# Patient Record
Sex: MALE | Race: WHITE | Employment: UNEMPLOYED
[De-identification: names, ages, dates, MRNs, and addresses within clinical notes are randomized per-mention and may not be internally consistent; named-entity substitution may affect disease eponyms.]

---

## 2017-03-08 ENCOUNTER — OFFICE VISIT (OUTPATIENT)
Dept: FAMILY MEDICINE CLINIC | Facility: CLINIC | Age: 37
End: 2017-03-08

## 2017-03-08 VITALS
TEMPERATURE: 97.9 F | HEART RATE: 122 BPM | WEIGHT: 315 LBS | BODY MASS INDEX: 36.45 KG/M2 | SYSTOLIC BLOOD PRESSURE: 160 MMHG | HEIGHT: 78 IN | RESPIRATION RATE: 24 BRPM | OXYGEN SATURATION: 95 % | DIASTOLIC BLOOD PRESSURE: 98 MMHG

## 2017-03-08 DIAGNOSIS — R23.8 BLISTERS OF MULTIPLE SITES: ICD-10-CM

## 2017-03-08 DIAGNOSIS — M79.604 RIGHT LEG PAIN: ICD-10-CM

## 2017-03-08 DIAGNOSIS — Z13.9 SCREENING: ICD-10-CM

## 2017-03-08 DIAGNOSIS — I10 ESSENTIAL HYPERTENSION: Primary | ICD-10-CM

## 2017-03-08 PROCEDURE — 99214 OFFICE O/P EST MOD 30 MIN: CPT | Performed by: FAMILY MEDICINE

## 2017-03-08 RX ORDER — LISINOPRIL AND HYDROCHLOROTHIAZIDE 25; 20 MG/1; MG/1
1 TABLET ORAL DAILY
Qty: 30 TABLET | Refills: 3 | Status: SHIPPED | OUTPATIENT
Start: 2017-03-08 | End: 2017-05-05 | Stop reason: SDUPTHER

## 2017-05-05 DIAGNOSIS — J32.9 SINUSITIS, UNSPECIFIED CHRONICITY, UNSPECIFIED LOCATION: ICD-10-CM

## 2017-05-05 DIAGNOSIS — J30.9 ALLERGIC RHINITIS, UNSPECIFIED ALLERGIC RHINITIS TRIGGER, UNSPECIFIED RHINITIS SEASONALITY: Primary | ICD-10-CM

## 2017-05-05 DIAGNOSIS — M79.604 RIGHT LEG PAIN: ICD-10-CM

## 2017-05-05 DIAGNOSIS — I10 ESSENTIAL HYPERTENSION: ICD-10-CM

## 2017-05-05 DIAGNOSIS — M79.89 LEFT LEG SWELLING: ICD-10-CM

## 2017-05-05 RX ORDER — IBUPROFEN 400 MG/1
400 TABLET ORAL EVERY 8 HOURS PRN
Qty: 90 TABLET | Refills: 0 | Status: SHIPPED | OUTPATIENT
Start: 2017-05-05 | End: 2017-10-10 | Stop reason: ALTCHOICE

## 2017-05-05 RX ORDER — LISINOPRIL AND HYDROCHLOROTHIAZIDE 25; 20 MG/1; MG/1
1 TABLET ORAL DAILY
Qty: 30 TABLET | Refills: 5 | Status: SHIPPED | OUTPATIENT
Start: 2017-05-05 | End: 2017-05-31 | Stop reason: ALTCHOICE

## 2017-05-05 RX ORDER — AMLODIPINE BESYLATE 10 MG/1
10 TABLET ORAL DAILY
Qty: 30 TABLET | Refills: 5 | Status: SHIPPED | OUTPATIENT
Start: 2017-05-05 | End: 2017-05-31 | Stop reason: SINTOL

## 2017-05-05 RX ORDER — FLUTICASONE PROPIONATE 50 MCG
2 SPRAY, SUSPENSION (ML) NASAL DAILY PRN
Qty: 1 BOTTLE | Refills: 3 | Status: SHIPPED | OUTPATIENT
Start: 2017-05-05 | End: 2017-11-30

## 2017-05-31 ENCOUNTER — OFFICE VISIT (OUTPATIENT)
Dept: FAMILY MEDICINE CLINIC | Age: 37
End: 2017-05-31
Payer: COMMERCIAL

## 2017-05-31 ENCOUNTER — HOSPITAL ENCOUNTER (OUTPATIENT)
Age: 37
Discharge: HOME | End: 2017-05-31
Payer: COMMERCIAL

## 2017-05-31 VITALS
OXYGEN SATURATION: 97 % | SYSTOLIC BLOOD PRESSURE: 168 MMHG | BODY MASS INDEX: 36.45 KG/M2 | DIASTOLIC BLOOD PRESSURE: 101 MMHG | HEIGHT: 78 IN | RESPIRATION RATE: 22 BRPM | TEMPERATURE: 97.5 F | HEART RATE: 103 BPM | WEIGHT: 315 LBS

## 2017-05-31 DIAGNOSIS — I10 ESSENTIAL HYPERTENSION: Primary | ICD-10-CM

## 2017-05-31 DIAGNOSIS — M79.89 BILATERAL SWELLING OF FEET: ICD-10-CM

## 2017-05-31 DIAGNOSIS — I10 ESSENTIAL HYPERTENSION: ICD-10-CM

## 2017-05-31 DIAGNOSIS — Z13.9 SCREENING: ICD-10-CM

## 2017-05-31 DIAGNOSIS — R73.03 PREDIABETES: ICD-10-CM

## 2017-05-31 LAB
ALBUMIN SERPL-MCNC: 4.2 G/DL (ref 3.5–5.2)
ALBUMIN/GLOBULIN RATIO: ABNORMAL (ref 1–2.5)
ALP BLD-CCNC: 61 U/L (ref 40–129)
ALT SERPL-CCNC: 38 U/L (ref 5–41)
ANION GAP SERPL CALCULATED.3IONS-SCNC: 14 MMOL/L (ref 9–17)
AST SERPL-CCNC: 35 U/L
BILIRUB SERPL-MCNC: 0.56 MG/DL (ref 0.3–1.2)
BUN BLDV-MCNC: 14 MG/DL (ref 6–20)
BUN/CREAT BLD: ABNORMAL (ref 9–20)
CALCIUM SERPL-MCNC: 9.5 MG/DL (ref 8.6–10.4)
CHLORIDE BLD-SCNC: 100 MMOL/L (ref 98–107)
CHOLESTEROL/HDL RATIO: 3.6
CHOLESTEROL: 158 MG/DL
CO2: 24 MMOL/L (ref 20–31)
CREAT SERPL-MCNC: 0.58 MG/DL (ref 0.7–1.2)
ESTIMATED AVERAGE GLUCOSE: 126 MG/DL
GFR AFRICAN AMERICAN: >60 ML/MIN
GFR NON-AFRICAN AMERICAN: >60 ML/MIN
GFR SERPL CREATININE-BSD FRML MDRD: ABNORMAL ML/MIN/{1.73_M2}
GFR SERPL CREATININE-BSD FRML MDRD: ABNORMAL ML/MIN/{1.73_M2}
GLUCOSE BLD-MCNC: 170 MG/DL (ref 70–99)
HBA1C MFR BLD: 6 % (ref 4–6)
HDLC SERPL-MCNC: 44 MG/DL
HIV AG/AB: NONREACTIVE
LDL CHOLESTEROL: 78 MG/DL (ref 0–130)
POTASSIUM SERPL-SCNC: 4.2 MMOL/L (ref 3.7–5.3)
SODIUM BLD-SCNC: 138 MMOL/L (ref 135–144)
TOTAL PROTEIN: 7.4 G/DL (ref 6.4–8.3)
TRIGL SERPL-MCNC: 180 MG/DL
VLDLC SERPL CALC-MCNC: ABNORMAL MG/DL (ref 1–30)

## 2017-05-31 PROCEDURE — 80061 LIPID PANEL: CPT

## 2017-05-31 PROCEDURE — 80053 COMPREHEN METABOLIC PANEL: CPT

## 2017-05-31 PROCEDURE — 87389 HIV-1 AG W/HIV-1&-2 AB AG IA: CPT

## 2017-05-31 PROCEDURE — 99214 OFFICE O/P EST MOD 30 MIN: CPT | Performed by: FAMILY MEDICINE

## 2017-05-31 PROCEDURE — 36415 COLL VENOUS BLD VENIPUNCTURE: CPT

## 2017-05-31 PROCEDURE — 83036 HEMOGLOBIN GLYCOSYLATED A1C: CPT

## 2017-05-31 RX ORDER — HYDROCHLOROTHIAZIDE 25 MG/1
25 TABLET ORAL DAILY
Qty: 30 TABLET | Refills: 3 | Status: SHIPPED | OUTPATIENT
Start: 2017-05-31 | End: 2017-11-10 | Stop reason: SDUPTHER

## 2017-05-31 RX ORDER — LISINOPRIL 40 MG/1
40 TABLET ORAL DAILY
Qty: 30 TABLET | Refills: 3 | Status: SHIPPED | OUTPATIENT
Start: 2017-05-31 | End: 2017-10-17 | Stop reason: SDUPTHER

## 2017-05-31 RX ORDER — BENZOCAINE/MENTHOL 6 MG-10 MG
LOZENGE MUCOUS MEMBRANE
Qty: 1 EACH | Refills: 0 | Status: SHIPPED | OUTPATIENT
Start: 2017-05-31 | End: 2017-10-10 | Stop reason: ALTCHOICE

## 2017-06-02 ENCOUNTER — TELEPHONE (OUTPATIENT)
Dept: FAMILY MEDICINE CLINIC | Age: 37
End: 2017-06-02

## 2017-06-14 ENCOUNTER — OFFICE VISIT (OUTPATIENT)
Dept: FAMILY MEDICINE CLINIC | Age: 37
End: 2017-06-14
Payer: COMMERCIAL

## 2017-06-14 VITALS
DIASTOLIC BLOOD PRESSURE: 88 MMHG | BODY MASS INDEX: 36.45 KG/M2 | RESPIRATION RATE: 20 BRPM | WEIGHT: 315 LBS | OXYGEN SATURATION: 95 % | SYSTOLIC BLOOD PRESSURE: 120 MMHG | HEIGHT: 78 IN | HEART RATE: 106 BPM | TEMPERATURE: 97.3 F

## 2017-06-14 DIAGNOSIS — I10 ESSENTIAL HYPERTENSION: ICD-10-CM

## 2017-06-14 DIAGNOSIS — R73.03 PREDIABETES: Primary | ICD-10-CM

## 2017-06-14 DIAGNOSIS — M79.89 BILATERAL SWELLING OF FEET: ICD-10-CM

## 2017-06-14 PROCEDURE — 99214 OFFICE O/P EST MOD 30 MIN: CPT | Performed by: FAMILY MEDICINE

## 2017-06-30 ENCOUNTER — HOSPITAL ENCOUNTER (EMERGENCY)
Age: 37
Discharge: HOME | End: 2017-06-30
Attending: EMERGENCY MEDICINE
Payer: COMMERCIAL

## 2017-06-30 ENCOUNTER — APPOINTMENT (OUTPATIENT)
Dept: GENERAL RADIOLOGY | Age: 37
End: 2017-06-30
Payer: COMMERCIAL

## 2017-06-30 VITALS
DIASTOLIC BLOOD PRESSURE: 55 MMHG | BODY MASS INDEX: 36.45 KG/M2 | HEIGHT: 78 IN | SYSTOLIC BLOOD PRESSURE: 118 MMHG | TEMPERATURE: 98.3 F | HEART RATE: 85 BPM | RESPIRATION RATE: 17 BRPM | OXYGEN SATURATION: 95 % | WEIGHT: 315 LBS

## 2017-06-30 DIAGNOSIS — R07.89 CHEST DISCOMFORT: Primary | ICD-10-CM

## 2017-06-30 LAB
ABSOLUTE BANDS #: 0.18 K/UL (ref 0–1)
ABSOLUTE EOS #: 0 K/UL (ref 0–0.4)
ABSOLUTE LYMPH #: 2.91 K/UL (ref 1–4.8)
ABSOLUTE MONO #: 1.09 K/UL (ref 0.1–1.3)
ANION GAP SERPL CALCULATED.3IONS-SCNC: 12 MMOL/L (ref 9–17)
BANDS: 1 %
BASOPHILS # BLD: 0 %
BASOPHILS ABSOLUTE: 0 K/UL (ref 0–0.2)
BUN BLDV-MCNC: 12 MG/DL (ref 6–20)
BUN/CREAT BLD: ABNORMAL (ref 9–20)
CALCIUM SERPL-MCNC: 9.5 MG/DL (ref 8.6–10.4)
CHLORIDE BLD-SCNC: 100 MMOL/L (ref 98–107)
CO2: 24 MMOL/L (ref 20–31)
CREAT SERPL-MCNC: 0.79 MG/DL (ref 0.7–1.2)
DIFFERENTIAL TYPE: ABNORMAL
EOSINOPHILS RELATIVE PERCENT: 0 %
GFR AFRICAN AMERICAN: >60 ML/MIN
GFR NON-AFRICAN AMERICAN: >60 ML/MIN
GFR SERPL CREATININE-BSD FRML MDRD: ABNORMAL ML/MIN/{1.73_M2}
GFR SERPL CREATININE-BSD FRML MDRD: ABNORMAL ML/MIN/{1.73_M2}
GLUCOSE BLD-MCNC: 120 MG/DL (ref 70–99)
HCT VFR BLD CALC: 44.6 % (ref 41–53)
HEMOGLOBIN: 15.1 G/DL (ref 13.5–17.5)
LYMPHOCYTES # BLD: 16 %
MCH RBC QN AUTO: 30 PG (ref 26–34)
MCHC RBC AUTO-ENTMCNC: 33.9 G/DL (ref 31–37)
MCV RBC AUTO: 88.4 FL (ref 80–100)
MONOCYTES # BLD: 6 %
MORPHOLOGY: NORMAL
MYOGLOBIN: 22 NG/ML (ref 28–72)
PDW BLD-RTO: 13.7 % (ref 11.5–14.9)
PLATELET # BLD: 237 K/UL (ref 150–450)
PLATELET ESTIMATE: ABNORMAL
PMV BLD AUTO: 9.5 FL (ref 6–12)
POTASSIUM SERPL-SCNC: 4.5 MMOL/L (ref 3.7–5.3)
RBC # BLD: 5.04 M/UL (ref 4.5–5.9)
RBC # BLD: ABNORMAL 10*6/UL
SEG NEUTROPHILS: 77 %
SEGMENTED NEUTROPHILS ABSOLUTE COUNT: 14.02 K/UL (ref 1.3–9.1)
SODIUM BLD-SCNC: 136 MMOL/L (ref 135–144)
TROPONIN INTERP: ABNORMAL
TROPONIN INTERP: NORMAL
TROPONIN T: <0.03 NG/ML
TROPONIN T: <0.03 NG/ML
WBC # BLD: 18.2 K/UL (ref 3.5–11)
WBC # BLD: ABNORMAL 10*3/UL

## 2017-06-30 PROCEDURE — 80048 BASIC METABOLIC PNL TOTAL CA: CPT

## 2017-06-30 PROCEDURE — 93005 ELECTROCARDIOGRAM TRACING: CPT

## 2017-06-30 PROCEDURE — 83874 ASSAY OF MYOGLOBIN: CPT

## 2017-06-30 PROCEDURE — 85025 COMPLETE CBC W/AUTO DIFF WBC: CPT

## 2017-06-30 PROCEDURE — 71020 XR CHEST STANDARD TWO VW: CPT

## 2017-06-30 PROCEDURE — 84484 ASSAY OF TROPONIN QUANT: CPT

## 2017-06-30 PROCEDURE — 99285 EMERGENCY DEPT VISIT HI MDM: CPT

## 2017-06-30 PROCEDURE — 36415 COLL VENOUS BLD VENIPUNCTURE: CPT

## 2017-06-30 RX ORDER — AMOXICILLIN 500 MG/1
500 CAPSULE ORAL 2 TIMES DAILY
COMMUNITY
End: 2017-10-10 | Stop reason: ALTCHOICE

## 2017-06-30 RX ORDER — ATORVASTATIN CALCIUM 40 MG/1
40 TABLET, FILM COATED ORAL DAILY
COMMUNITY
End: 2017-11-30 | Stop reason: DRUGHIGH

## 2017-06-30 ASSESSMENT — PAIN DESCRIPTION - PAIN TYPE: TYPE: ACUTE PAIN

## 2017-06-30 ASSESSMENT — ENCOUNTER SYMPTOMS
VOMITING: 0
NAUSEA: 0
SHORTNESS OF BREATH: 0
ABDOMINAL PAIN: 0
COUGH: 0

## 2017-06-30 ASSESSMENT — HEART SCORE: ECG: 0

## 2017-06-30 ASSESSMENT — PAIN DESCRIPTION - LOCATION: LOCATION: CHEST

## 2017-06-30 ASSESSMENT — PAIN SCALES - GENERAL: PAINLEVEL_OUTOF10: 6

## 2017-07-05 LAB
EKG ATRIAL RATE: 99 BPM
EKG P AXIS: 49 DEGREES
EKG P-R INTERVAL: 146 MS
EKG Q-T INTERVAL: 344 MS
EKG QRS DURATION: 86 MS
EKG QTC CALCULATION (BAZETT): 441 MS
EKG T AXIS: 34 DEGREES
EKG VENTRICULAR RATE: 99 BPM

## 2017-09-20 ENCOUNTER — HOSPITAL ENCOUNTER (EMERGENCY)
Age: 37
Discharge: HOME | End: 2017-09-20
Attending: EMERGENCY MEDICINE
Payer: COMMERCIAL

## 2017-09-20 ENCOUNTER — APPOINTMENT (OUTPATIENT)
Dept: GENERAL RADIOLOGY | Age: 37
End: 2017-09-20
Payer: COMMERCIAL

## 2017-09-20 VITALS
DIASTOLIC BLOOD PRESSURE: 83 MMHG | SYSTOLIC BLOOD PRESSURE: 156 MMHG | HEART RATE: 77 BPM | OXYGEN SATURATION: 99 % | RESPIRATION RATE: 12 BRPM | TEMPERATURE: 96.8 F

## 2017-09-20 DIAGNOSIS — S99.922A TOE INJURY, LEFT, INITIAL ENCOUNTER: ICD-10-CM

## 2017-09-20 DIAGNOSIS — S93.402A SPRAIN OF LEFT ANKLE, UNSPECIFIED LIGAMENT, INITIAL ENCOUNTER: Primary | ICD-10-CM

## 2017-09-20 PROCEDURE — 73630 X-RAY EXAM OF FOOT: CPT

## 2017-09-20 PROCEDURE — 6370000000 HC RX 637 (ALT 250 FOR IP): Performed by: PHYSICIAN ASSISTANT

## 2017-09-20 PROCEDURE — 73610 X-RAY EXAM OF ANKLE: CPT

## 2017-09-20 PROCEDURE — 99283 EMERGENCY DEPT VISIT LOW MDM: CPT

## 2017-09-20 RX ORDER — HYDROCODONE BITARTRATE AND ACETAMINOPHEN 5; 325 MG/1; MG/1
1 TABLET ORAL ONCE
Status: COMPLETED | OUTPATIENT
Start: 2017-09-20 | End: 2017-09-20

## 2017-09-20 RX ORDER — IBUPROFEN 800 MG/1
800 TABLET ORAL EVERY 8 HOURS PRN
Qty: 20 TABLET | Refills: 0 | Status: SHIPPED | OUTPATIENT
Start: 2017-09-20 | End: 2018-03-29

## 2017-09-20 RX ADMIN — HYDROCODONE BITARTRATE AND ACETAMINOPHEN 1 TABLET: 5; 325 TABLET ORAL at 15:31

## 2017-09-20 ASSESSMENT — ENCOUNTER SYMPTOMS
COUGH: 0
NAUSEA: 0
TROUBLE SWALLOWING: 0
WHEEZING: 0
SHORTNESS OF BREATH: 0
VOMITING: 0

## 2017-09-20 ASSESSMENT — PAIN DESCRIPTION - DESCRIPTORS: DESCRIPTORS: ACHING;PRESSURE

## 2017-09-20 ASSESSMENT — PAIN DESCRIPTION - ONSET: ONSET: SUDDEN

## 2017-09-20 ASSESSMENT — PAIN DESCRIPTION - PAIN TYPE: TYPE: ACUTE PAIN

## 2017-09-20 ASSESSMENT — PAIN DESCRIPTION - FREQUENCY: FREQUENCY: CONTINUOUS

## 2017-09-20 ASSESSMENT — PAIN DESCRIPTION - LOCATION: LOCATION: TOE (COMMENT WHICH ONE)

## 2017-09-20 ASSESSMENT — PAIN DESCRIPTION - ORIENTATION: ORIENTATION: RIGHT

## 2017-09-20 ASSESSMENT — PAIN SCALES - GENERAL
PAINLEVEL_OUTOF10: 8
PAINLEVEL_OUTOF10: 8

## 2017-10-10 ENCOUNTER — OFFICE VISIT (OUTPATIENT)
Dept: FAMILY MEDICINE CLINIC | Age: 37
End: 2017-10-10
Payer: COMMERCIAL

## 2017-10-10 VITALS
TEMPERATURE: 97 F | HEART RATE: 84 BPM | BODY MASS INDEX: 36.45 KG/M2 | RESPIRATION RATE: 17 BRPM | HEIGHT: 78 IN | WEIGHT: 315 LBS | DIASTOLIC BLOOD PRESSURE: 86 MMHG | SYSTOLIC BLOOD PRESSURE: 136 MMHG

## 2017-10-10 DIAGNOSIS — R19.8 POSITIVE MURPHY'S SIGN: ICD-10-CM

## 2017-10-10 DIAGNOSIS — F41.9 ANXIETY: ICD-10-CM

## 2017-10-10 DIAGNOSIS — Z82.49: ICD-10-CM

## 2017-10-10 DIAGNOSIS — E78.1 HYPERTRIGLYCERIDEMIA: ICD-10-CM

## 2017-10-10 DIAGNOSIS — E78.5 HYPERLIPIDEMIA WITH TARGET LDL LESS THAN 100: ICD-10-CM

## 2017-10-10 DIAGNOSIS — Z23 NEEDS FLU SHOT: ICD-10-CM

## 2017-10-10 DIAGNOSIS — R07.89 ATYPICAL CHEST PAIN: Primary | ICD-10-CM

## 2017-10-10 DIAGNOSIS — I10 ESSENTIAL HYPERTENSION: ICD-10-CM

## 2017-10-10 DIAGNOSIS — F33.2 SEVERE EPISODE OF RECURRENT MAJOR DEPRESSIVE DISORDER, WITHOUT PSYCHOTIC FEATURES (HCC): ICD-10-CM

## 2017-10-10 DIAGNOSIS — R10.11 RUQ PAIN: ICD-10-CM

## 2017-10-10 DIAGNOSIS — R73.9 HYPERGLYCEMIA: ICD-10-CM

## 2017-10-10 DIAGNOSIS — E66.01 MORBID OBESITY WITH BMI OF 45.0-49.9, ADULT (HCC): ICD-10-CM

## 2017-10-10 DIAGNOSIS — Z87.891 EX-SMOKER: ICD-10-CM

## 2017-10-10 PROBLEM — R23.8 BLISTERS OF MULTIPLE SITES: Status: RESOLVED | Noted: 2017-03-08 | Resolved: 2017-10-10

## 2017-10-10 LAB — HBA1C MFR BLD: 5.3 %

## 2017-10-10 PROCEDURE — 96127 BRIEF EMOTIONAL/BEHAV ASSMT: CPT | Performed by: FAMILY MEDICINE

## 2017-10-10 PROCEDURE — 90471 IMMUNIZATION ADMIN: CPT | Performed by: FAMILY MEDICINE

## 2017-10-10 PROCEDURE — 83036 HEMOGLOBIN GLYCOSYLATED A1C: CPT | Performed by: FAMILY MEDICINE

## 2017-10-10 PROCEDURE — 99214 OFFICE O/P EST MOD 30 MIN: CPT | Performed by: FAMILY MEDICINE

## 2017-10-10 PROCEDURE — 90686 IIV4 VACC NO PRSV 0.5 ML IM: CPT | Performed by: FAMILY MEDICINE

## 2017-10-10 RX ORDER — BUPROPION HYDROCHLORIDE 150 MG/1
150 TABLET ORAL EVERY MORNING
Qty: 30 TABLET | Refills: 3 | Status: SHIPPED | OUTPATIENT
Start: 2017-10-10 | End: 2017-11-30 | Stop reason: SDUPTHER

## 2017-10-10 RX ORDER — BUSPIRONE HYDROCHLORIDE 10 MG/1
10 TABLET ORAL 3 TIMES DAILY
Qty: 90 TABLET | Refills: 0 | Status: SHIPPED | OUTPATIENT
Start: 2017-10-10 | End: 2017-11-30

## 2017-10-10 ASSESSMENT — PATIENT HEALTH QUESTIONNAIRE - PHQ9
9. THOUGHTS THAT YOU WOULD BE BETTER OFF DEAD, OR OF HURTING YOURSELF: 3
1. LITTLE INTEREST OR PLEASURE IN DOING THINGS: 2
5. POOR APPETITE OR OVEREATING: 2
2. FEELING DOWN, DEPRESSED OR HOPELESS: 3
3. TROUBLE FALLING OR STAYING ASLEEP: 3
4. FEELING TIRED OR HAVING LITTLE ENERGY: 2
SUM OF ALL RESPONSES TO PHQ9 QUESTIONS 1 & 2: 5
SUM OF ALL RESPONSES TO PHQ QUESTIONS 1-9: 22
8. MOVING OR SPEAKING SO SLOWLY THAT OTHER PEOPLE COULD HAVE NOTICED. OR THE OPPOSITE, BEING SO FIGETY OR RESTLESS THAT YOU HAVE BEEN MOVING AROUND A LOT MORE THAN USUAL: 3
10. IF YOU CHECKED OFF ANY PROBLEMS, HOW DIFFICULT HAVE THESE PROBLEMS MADE IT FOR YOU TO DO YOUR WORK, TAKE CARE OF THINGS AT HOME, OR GET ALONG WITH OTHER PEOPLE: 2
6. FEELING BAD ABOUT YOURSELF - OR THAT YOU ARE A FAILURE OR HAVE LET YOURSELF OR YOUR FAMILY DOWN: 2
7. TROUBLE CONCENTRATING ON THINGS, SUCH AS READING THE NEWSPAPER OR WATCHING TELEVISION: 2

## 2017-10-10 ASSESSMENT — ENCOUNTER SYMPTOMS
SHORTNESS OF BREATH: 0
CONSTIPATION: 0
COUGH: 0
DIARRHEA: 0
ABDOMINAL DISTENTION: 0
WHEEZING: 0
CHEST TIGHTNESS: 0
ABDOMINAL PAIN: 1
VOMITING: 1
NAUSEA: 1

## 2017-10-10 NOTE — PROGRESS NOTES
Chief Complaint   Patient presents with    Chest Pain     s/s a few months     Hypertension    Nail Problem     pt states fell and whole nail came off , right great toe       Murleen Quale  here today for follow up on chronic medical problems, and medication refills. Chest Pain (s/s a few months ); Hypertension; and Nail Problem (pt states fell and whole nail came off , right great toe)    Melissa Templeton complains of chest pain in the upper chest, located on the right side, feels like throbbing, lasting a few minutes to 1 hr at most. Reports pain usually starts after eating, associated with nausea and vomiting. Reports that he gets nausea and vomiting after eating certain foods. Sometimes he also has stomach distention. Melissa Templeton was seen in ED On 6/30/17 due to chest pain and he was told he will need a stress test.    Per records in [facility], he presented to the emergency room on 6/30/17 for chest pain with palpitations, chest pain located midsternal without radiation. Chest pain was at rest.      He is worried of heart disease as his paternal grandmother had heart attack in her early 46s      Hypertension: Patient here for follow-up of elevated blood pressure. He is not exercising, but active, and is adherent to low salt diet. Blood pressure is well controlled at home. Cardiac symptoms chest pain, chest pressure/discomfort and fatigue. Patient denies claudication, dyspnea, irregular heart beat, lower extremity edema, near-syncope, orthopnea, palpitations, paroxysmal nocturnal dyspnea, syncope and tachypnea. Cardiovascular risk factors: dyslipidemia, family history of premature cardiovascular disease, hypertension, male gender, obesity (BMI >= 30 kg/m2) and smoking/ tobacco exposure. He quit smoking 3/26/17. Quit smoking 6 mo ago. He previously smoked qver 1 PPD x 23 yrs. Quit cold turkey      PGM had MI at 55 yo. Use of agents associated with hypertension: none. History of target organ damage: none.       EKG range of motion. Neck supple. No thyromegaly present. Cardiovascular: Normal rate, regular rhythm, normal heart sounds and intact distal pulses. No murmur heard. Pulmonary/Chest: Effort normal and breath sounds normal. No respiratory distress. He has no wheezes. He has no rales. He exhibits no tenderness and no bony tenderness. Chest pain is not reproducible with direct pressure   Abdominal: Soft. Bowel sounds are normal. He exhibits no distension. There is tenderness in the right upper quadrant. There is positive Clay's sign. There is no rigidity and no guarding. Obese abdomen. Musculoskeletal: Normal range of motion. He exhibits no edema or tenderness. Lymphadenopathy:     He has no cervical adenopathy. Neurological: He is alert and oriented to person, place, and time. He has normal reflexes. No cranial nerve deficit. He exhibits normal muscle tone. Coordination normal.   Skin: Skin is warm and dry. No rash noted. He is not diaphoretic. Right great toenail partial avulsion, nail bed is clean. There is no tenderness, redness or swelling    Psychiatric: His behavior is normal. Judgment and thought content normal. His mood appears anxious. His affect is labile. He exhibits a depressed mood. Nursing note and vitals reviewed. ASSESSMENT AND PLAN      1. Atypical chest pain    - (Gxt) Stress Test Exercise W Out Myoview; Future  Patient does have multiple risk factors: Hypertension, hyperlipidemia, family history, ex-smoker, recently quit smoking, male gender    2. Essential hypertension  Controlled  Discussed low salt diet and BP and pulse monitoring daily, BP log given  Continue current treatment.    - (Gxt) Stress Test Exercise W Out Myoview; Future  - CBC; Future  - Comprehensive Metabolic Panel; Future  - TSH without Reflex; Future    3. RUQ pain  - US Liver; Future  - CBC; Future  - Comprehensive Metabolic Panel; Future    4.  Severe episode of recurrent major depressive disorder, [place] [facility]   1/10/2018 1:30 PM [facility]        This note was completed by using the assistance of a speech-recognition program. However, inadvertent computerized transcription errors may be present. Although every effort was made to ensure accuracy, no guarantees can be provided that every mistake has been identified and corrected by editing.     Electronically signed by Melanie Wong MD on 10/10/2017 at 3:27 PM

## 2017-10-17 ENCOUNTER — OFFICE VISIT (OUTPATIENT)
Dept: BEHAVIORAL/MENTAL HEALTH CLINIC | Age: 37
End: 2017-10-17
Payer: COMMERCIAL

## 2017-10-17 DIAGNOSIS — M79.89 BILATERAL SWELLING OF FEET: ICD-10-CM

## 2017-10-17 DIAGNOSIS — I10 ESSENTIAL HYPERTENSION: ICD-10-CM

## 2017-10-17 DIAGNOSIS — F39 MOOD DISORDER (HCC): Primary | ICD-10-CM

## 2017-10-17 DIAGNOSIS — F40.10 SOCIAL PHOBIA: ICD-10-CM

## 2017-10-17 PROCEDURE — 90791 PSYCH DIAGNOSTIC EVALUATION: CPT | Performed by: SOCIAL WORKER

## 2017-10-17 RX ORDER — LISINOPRIL 40 MG/1
TABLET ORAL
Qty: 30 TABLET | Refills: 2 | Status: SHIPPED | OUTPATIENT
Start: 2017-10-17 | End: 2018-02-01 | Stop reason: SDUPTHER

## 2017-10-17 RX ORDER — LISINOPRIL 40 MG/1
40 TABLET ORAL DAILY
Qty: 30 TABLET | Refills: 3 | OUTPATIENT
Start: 2017-10-17
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
## 2017-10-17 NOTE — TELEPHONE ENCOUNTER
PLEASE APPROVE OR REFUSE.     Next Visit Date: Future Appointments  Date Time Provider Sejal Romero   10/17/2017 3:30 PM TITO Painter [facility] PSYC [facility]   1/10/2018 1:30 PM Eileen Nichols MD fp sc [address] Maintenance   Topic Date Due    DTaP/Tdap/Td vaccine (2 - Td) 12/28/2026    Flu vaccine  Completed    HIV screen  Completed       Hemoglobin A1C (%)   Date Value   10/10/2017 5.3   05/31/2017 6.0   12/09/2015 5.6             ( goal A1C is < 7)   No results found for: LABMICR  LDL Cholesterol (mg/dL)   Date Value   05/31/2017 78       (goal LDL is <100)   AST (U/L)   Date Value   05/31/2017 35     ALT (U/L)   Date Value   05/31/2017 38     BUN (mg/dL)   Date Value   06/30/2017 12     BP Readings from Last 3 Encounters:   10/10/17 136/86   09/20/17 (!) 156/83   06/30/17 (!) 118/55          (goal 120/80)    All Future Testing planned in [facility]  Lab Frequency Next Occurrence   US Liver Once 11/09/2017   (Gxt) Stress Test Exercise W Out Myoview Once 01/10/2018   CBC Once 01/11/2018   Comprehensive Metabolic Panel Once 06/46/0830   TSH without Reflex Once 01/08/2018         Patient Active Problem List:     HTN (hypertension)     Anxiety     Chronic fatigue     Snoring     Prediabetes     Hyperglycemia     RUQ pain     Positive Clay's Sign     Atypical chest pain     Family history of cardiac disorder in paternal grandmother     Ex-smoker     Severe episode of recurrent major depressive disorder, without psychotic features ([address]     Morbid obesity with BMI of 45.0-49.9, adult ([address]     Hyperlipidemia with target LDL less than 100     Hypertriglyceridemia

## 2017-10-17 NOTE — TELEPHONE ENCOUNTER
Please Approve and Refuse.   Send to Pharmacy per Pt's Request: [facility] [address]     Next Visit Date:  1/10/2018    Hemoglobin A1C (%)   Date Value   10/10/2017 5.3   05/31/2017 6.0   12/09/2015 5.6             ( goal A1C is < 7)   No results found for: LABMICR  LDL Cholesterol (mg/dL)   Date Value   05/31/2017 78       (goal LDL is <100)   AST (U/L)   Date Value   05/31/2017 35     ALT (U/L)   Date Value   05/31/2017 38     BUN (mg/dL)   Date Value   06/30/2017 12     BP Readings from Last 3 Encounters:   10/10/17 136/86   09/20/17 (!) 156/83   06/30/17 (!) 118/55          (goal 120/80)        Patient Active Problem List:     HTN (hypertension)     Anxiety     Chronic fatigue     Snoring     Prediabetes     Hyperglycemia     RUQ pain     Positive Clay's Sign     Atypical chest pain     Family history of cardiac disorder in paternal grandmother     Ex-smoker     Severe episode of recurrent major depressive disorder, without psychotic features ([address]     Morbid obesity with BMI of 45.0-49.9, adult ([address]     Hyperlipidemia with target LDL less than 100     Hypertriglyceridemia      ----[facility]

## 2017-10-24 ENCOUNTER — HOSPITAL ENCOUNTER (OUTPATIENT)
Age: 37
Discharge: HOME | End: 2017-10-24
Payer: COMMERCIAL

## 2017-10-24 ENCOUNTER — INITIAL CONSULT (OUTPATIENT)
Dept: BEHAVIORAL/MENTAL HEALTH CLINIC | Age: 37
End: 2017-10-24
Payer: COMMERCIAL

## 2017-10-24 DIAGNOSIS — R10.11 RUQ PAIN: ICD-10-CM

## 2017-10-24 DIAGNOSIS — F39 MOOD DISORDER (HCC): ICD-10-CM

## 2017-10-24 DIAGNOSIS — I10 ESSENTIAL HYPERTENSION: ICD-10-CM

## 2017-10-24 DIAGNOSIS — F33.2 SEVERE EPISODE OF RECURRENT MAJOR DEPRESSIVE DISORDER, WITHOUT PSYCHOTIC FEATURES (HCC): ICD-10-CM

## 2017-10-24 DIAGNOSIS — F63.81 INTERMITTENT EXPLOSIVE DISORDER IN ADULT: Primary | ICD-10-CM

## 2017-10-24 DIAGNOSIS — E66.01 MORBID OBESITY WITH BMI OF 45.0-49.9, ADULT (HCC): ICD-10-CM

## 2017-10-24 DIAGNOSIS — F40.10 SOCIAL PHOBIA: ICD-10-CM

## 2017-10-24 DIAGNOSIS — F41.9 ANXIETY: ICD-10-CM

## 2017-10-24 LAB
ALBUMIN SERPL-MCNC: 4.3 G/DL (ref 3.5–5.2)
ALBUMIN/GLOBULIN RATIO: ABNORMAL (ref 1–2.5)
ALP BLD-CCNC: 64 U/L (ref 40–129)
ALT SERPL-CCNC: 23 U/L (ref 5–41)
ANION GAP SERPL CALCULATED.3IONS-SCNC: 10 MMOL/L (ref 9–17)
AST SERPL-CCNC: 23 U/L
BILIRUB SERPL-MCNC: 0.73 MG/DL (ref 0.3–1.2)
BUN BLDV-MCNC: 8 MG/DL (ref 6–20)
BUN/CREAT BLD: ABNORMAL (ref 9–20)
CALCIUM SERPL-MCNC: 9.7 MG/DL (ref 8.6–10.4)
CHLORIDE BLD-SCNC: 98 MMOL/L (ref 98–107)
CO2: 29 MMOL/L (ref 20–31)
CREAT SERPL-MCNC: 0.83 MG/DL (ref 0.7–1.2)
GFR AFRICAN AMERICAN: >60 ML/MIN
GFR NON-AFRICAN AMERICAN: >60 ML/MIN
GFR SERPL CREATININE-BSD FRML MDRD: ABNORMAL ML/MIN/{1.73_M2}
GFR SERPL CREATININE-BSD FRML MDRD: ABNORMAL ML/MIN/{1.73_M2}
GLUCOSE BLD-MCNC: 126 MG/DL (ref 70–99)
HCT VFR BLD CALC: 47.4 % (ref 41–53)
HEMOGLOBIN: 15.8 G/DL (ref 13.5–17.5)
MCH RBC QN AUTO: 29.3 PG (ref 26–34)
MCHC RBC AUTO-ENTMCNC: 33.2 G/DL (ref 31–37)
MCV RBC AUTO: 88 FL (ref 80–100)
PDW BLD-RTO: 13.9 % (ref 11.5–14.9)
PLATELET # BLD: 214 K/UL (ref 150–450)
PMV BLD AUTO: 9.2 FL (ref 6–12)
POTASSIUM SERPL-SCNC: 4.8 MMOL/L (ref 3.7–5.3)
RBC # BLD: 5.39 M/UL (ref 4.5–5.9)
SODIUM BLD-SCNC: 137 MMOL/L (ref 135–144)
TOTAL PROTEIN: 7.7 G/DL (ref 6.4–8.3)
TSH SERPL DL<=0.05 MIU/L-ACNC: 2.73 MIU/L (ref 0.3–5)
WBC # BLD: 12 K/UL (ref 3.5–11)

## 2017-10-24 PROCEDURE — 80053 COMPREHEN METABOLIC PANEL: CPT

## 2017-10-24 PROCEDURE — 85027 COMPLETE CBC AUTOMATED: CPT

## 2017-10-24 PROCEDURE — 90832 PSYTX W PT 30 MINUTES: CPT | Performed by: SOCIAL WORKER

## 2017-10-24 PROCEDURE — 84443 ASSAY THYROID STIM HORMONE: CPT

## 2017-10-24 PROCEDURE — 36415 COLL VENOUS BLD VENIPUNCTURE: CPT

## 2017-10-24 NOTE — PROGRESS NOTES
PLEASE NOTIFY PATIENT. Mild increased Blood Glucose , 126. We did check A1c and he doesn't have prediabetes. white blood cell count greatly improved  Otherwise labs within normal limits  Low carb, low fat diet, increase fruits and vegetables, and exercise 4-5 times a week 30-40 minutes a day, or walk 1-2 hours per day, or wear a pedometer and get at least 10,000 steps per day.     Lab Results       Component                Value               Date                       LABA1C                   5.3                 10/10/2017            Lab Results       Component                Value               Date                       EAG                      126                 05/31/2017
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
## 2017-10-27 ENCOUNTER — TELEPHONE (OUTPATIENT)
Dept: FAMILY MEDICINE CLINIC | Age: 37
End: 2017-10-27

## 2017-10-27 NOTE — TELEPHONE ENCOUNTER
Patient wants to know if he should stop taking metFORMIN (GLUCOPHAGE) 500 MG since he is not diabetic nor prediabetic (per lab results)  Please Advice   Next Visit Date:  Future Appointments  Date Time Provider Sejal Wynnei   1/10/2018 1:30 PM Hilda Rodriguez MD fp sc [address] Maintenance   Topic Date Due    DTaP/Tdap/Td vaccine (2 - Td) 12/28/2026    Flu vaccine  Completed    HIV screen  Completed       Hemoglobin A1C (%)   Date Value   10/10/2017 5.3   05/31/2017 6.0   12/09/2015 5.6             ( goal A1C is < 7)   No results found for: LABMICR  LDL Cholesterol (mg/dL)   Date Value   05/31/2017 78       (goal LDL is <100)   AST (U/L)   Date Value   10/24/2017 23     ALT (U/L)   Date Value   10/24/2017 23     BUN (mg/dL)   Date Value   10/24/2017 8     BP Readings from Last 3 Encounters:   10/10/17 136/86   09/20/17 (!) 156/83   06/30/17 (!) 118/55          (goal 120/80)    All Future Testing planned in [facility]  Lab Frequency Next Occurrence   US Liver Once 11/09/2017   (Gxt) Stress Test Exercise W Out Myoview Once 01/10/2018               Patient Active Problem List:     HTN (hypertension)     Anxiety     Chronic fatigue     Snoring     Prediabetes     Hyperglycemia     RUQ pain     Positive Clay's Sign     Atypical chest pain     Family history of cardiac disorder in paternal grandmother     Ex-smoker     Severe episode of recurrent major depressive disorder, without psychotic features ([address]     Morbid obesity with BMI of 45.0-49.9, adult ([address]     Hyperlipidemia with target LDL less than 100     Hypertriglyceridemia     Intermittent explosive disorder in adult

## 2017-10-27 NOTE — TELEPHONE ENCOUNTER
Yes, okay to stop metformin. That was started for him to help with weight loss.   Lab Results   Component Value Date    LABA1C 5.3 10/10/2017     Lab Results   Component Value Date     05/31/2017

## 2017-10-31 ENCOUNTER — TELEPHONE (OUTPATIENT)
Dept: BEHAVIORAL/MENTAL HEALTH CLINIC | Age: 37
End: 2017-10-31

## 2017-11-10 DIAGNOSIS — I10 ESSENTIAL HYPERTENSION: ICD-10-CM

## 2017-11-13 RX ORDER — HYDROCHLOROTHIAZIDE 25 MG/1
TABLET ORAL
Qty: 30 TABLET | Refills: 2 | Status: SHIPPED | OUTPATIENT
Start: 2017-11-13 | End: 2018-03-02 | Stop reason: SDUPTHER

## 2017-11-13 NOTE — TELEPHONE ENCOUNTER
Please Approve or Refuse.        Next Visit Date:  1/10/2018    Hemoglobin A1C (%)   Date Value   10/10/2017 5.3   05/31/2017 6.0   12/09/2015 5.6             ( goal A1C is < 7)   No results found for: LABMICR  LDL Cholesterol (mg/dL)   Date Value   05/31/2017 78       (goal LDL is <100)   AST (U/L)   Date Value   10/24/2017 23     ALT (U/L)   Date Value   10/24/2017 23     BUN (mg/dL)   Date Value   10/24/2017 8     BP Readings from Last 3 Encounters:   10/10/17 136/86   09/20/17 (!) 156/83   06/30/17 (!) 118/55          (goal 120/80)        Patient Active Problem List:     HTN (hypertension)     Anxiety     Chronic fatigue     Snoring     Prediabetes     Hyperglycemia     RUQ pain     Positive Clay's Sign     Atypical chest pain     Family history of cardiac disorder in paternal grandmother     Ex-smoker     Severe episode of recurrent major depressive disorder, without psychotic features ([address]     Morbid obesity with BMI of 45.0-49.9, adult ([address]     Hyperlipidemia with target LDL less than 100     Hypertriglyceridemia     Intermittent explosive disorder in adult

## 2017-11-30 ENCOUNTER — OFFICE VISIT (OUTPATIENT)
Dept: FAMILY MEDICINE CLINIC | Age: 37
End: 2017-11-30
Payer: COMMERCIAL

## 2017-11-30 VITALS
HEART RATE: 101 BPM | TEMPERATURE: 98.1 F | HEIGHT: 78 IN | WEIGHT: 315 LBS | SYSTOLIC BLOOD PRESSURE: 134 MMHG | OXYGEN SATURATION: 95 % | BODY MASS INDEX: 36.45 KG/M2 | DIASTOLIC BLOOD PRESSURE: 86 MMHG

## 2017-11-30 DIAGNOSIS — F51.04 PSYCHOPHYSIOLOGICAL INSOMNIA: ICD-10-CM

## 2017-11-30 DIAGNOSIS — F33.2 SEVERE EPISODE OF RECURRENT MAJOR DEPRESSIVE DISORDER, WITHOUT PSYCHOTIC FEATURES (HCC): ICD-10-CM

## 2017-11-30 DIAGNOSIS — R73.03 PREDIABETES: ICD-10-CM

## 2017-11-30 DIAGNOSIS — F41.9 ANXIETY: ICD-10-CM

## 2017-11-30 DIAGNOSIS — I10 ESSENTIAL HYPERTENSION: Primary | ICD-10-CM

## 2017-11-30 DIAGNOSIS — R07.89 ATYPICAL CHEST PAIN: ICD-10-CM

## 2017-11-30 DIAGNOSIS — E78.5 HYPERLIPIDEMIA WITH TARGET LDL LESS THAN 100: ICD-10-CM

## 2017-11-30 DIAGNOSIS — R53.82 CHRONIC FATIGUE: ICD-10-CM

## 2017-11-30 PROCEDURE — G8484 FLU IMMUNIZE NO ADMIN: HCPCS | Performed by: FAMILY MEDICINE

## 2017-11-30 PROCEDURE — 99214 OFFICE O/P EST MOD 30 MIN: CPT | Performed by: FAMILY MEDICINE

## 2017-11-30 PROCEDURE — G8417 CALC BMI ABV UP PARAM F/U: HCPCS | Performed by: FAMILY MEDICINE

## 2017-11-30 PROCEDURE — G8427 DOCREV CUR MEDS BY ELIG CLIN: HCPCS | Performed by: FAMILY MEDICINE

## 2017-11-30 PROCEDURE — 1036F TOBACCO NON-USER: CPT | Performed by: FAMILY MEDICINE

## 2017-11-30 RX ORDER — ATORVASTATIN CALCIUM 10 MG/1
10 TABLET, FILM COATED ORAL DAILY
Qty: 30 TABLET | Refills: 3 | Status: SHIPPED | OUTPATIENT
Start: 2017-11-30 | End: 2018-04-11 | Stop reason: SDUPTHER

## 2017-11-30 RX ORDER — BUPROPION HYDROCHLORIDE 300 MG/1
300 TABLET ORAL EVERY MORNING
Qty: 30 TABLET | Refills: 3 | Status: SHIPPED | OUTPATIENT
Start: 2017-11-30 | End: 2018-04-11 | Stop reason: SDUPTHER

## 2017-11-30 RX ORDER — CLONIDINE HYDROCHLORIDE 0.1 MG/1
0.1 TABLET ORAL 2 TIMES DAILY
Qty: 60 TABLET | Refills: 3 | Status: SHIPPED | OUTPATIENT
Start: 2017-11-30 | End: 2018-03-29 | Stop reason: HOSPADM

## 2017-11-30 RX ORDER — TRAZODONE HYDROCHLORIDE 50 MG/1
50 TABLET ORAL NIGHTLY PRN
Qty: 30 TABLET | Refills: 1 | Status: SHIPPED | OUTPATIENT
Start: 2017-11-30 | End: 2018-02-01 | Stop reason: SDUPTHER

## 2017-11-30 ASSESSMENT — ENCOUNTER SYMPTOMS
ABDOMINAL PAIN: 0
CONSTIPATION: 0
VOMITING: 0
CHEST TIGHTNESS: 0
COUGH: 0
NAUSEA: 0
ABDOMINAL DISTENTION: 0
WHEEZING: 0
SHORTNESS OF BREATH: 0
DIARRHEA: 0

## 2017-11-30 NOTE — PATIENT INSTRUCTIONS
Patient Education        Anxiety Disorder: Care Instructions  Your Care Instructions  Anxiety is a normal reaction to stress. Difficult situations can cause you to have symptoms such as sweaty palms and a nervous feeling. In an anxiety disorder, the symptoms are far more severe. Constant worry, muscle tension, trouble sleeping, nausea and diarrhea, and other symptoms can make normal daily activities difficult or impossible. These symptoms may occur for no reason, and they can affect your work, school, or social life. Medicines, counseling, and self-care can all help. Follow-up care is a key part of your treatment and safety. Be sure to make and go to all appointments, and call your doctor if you are having problems. It's also a good idea to know your test results and keep a list of the medicines you take. How can you care for yourself at home? · Take medicines exactly as directed. Call your doctor if you think you are having a problem with your medicine. · Go to your counseling sessions and follow-up appointments. · Recognize and accept your anxiety. Then, when you are in a situation that makes you anxious, say to yourself, \"This is not an emergency. I feel uncomfortable, but I am not in danger. I can keep going even if I feel anxious. \"  · Be kind to your body:  ¨ Relieve tension with exercise or a massage. ¨ Get enough rest.  ¨ Avoid alcohol, caffeine, nicotine, and illegal drugs. They can increase your anxiety level and cause sleep problems. ¨ Learn and do relaxation techniques. See below for more about these techniques. · Engage your mind. Get out and do something you enjoy. Go to a funny movie, or take a walk or hike. Plan your day. Having too much or too little to do can make you anxious. · Keep a record of your symptoms. Discuss your fears with a good friend or family member, or join a support group for people with similar problems. Talking to others sometimes relieves stress.   · Get involved in your mood and your thoughts can suffer too. You may find yourself feeling more grumpy or stressed. Not getting enough sleep also can lead to serious problems, including injury, accidents, anxiety, and depression. What might cause poor sleeping? Many things can cause sleep problems, including:  · Stress. Stress can be caused by fear about a single event, such as giving a speech. Or you may have ongoing stress, such as worry about work or school. · Depression, anxiety, and other mental or emotional conditions. · Changes in your sleep habits or surroundings. This includes changes that happen where you sleep, such as noise, light, or sleeping in a different bed. It also includes changes in your sleep pattern, such as having jet lag or working a late shift. · Health problems, such as pain, breathing problems, and restless legs syndrome. · Lack of regular exercise. How can you help yourself? Here are some tips that may help you sleep more soundly and wake up feeling more refreshed. Your sleeping area  · Use your bedroom only for sleeping and sex. A bit of light reading may help you fall asleep. But if it doesn't, do your reading elsewhere in the house. Don't watch TV in bed. · Be sure your bed is big enough to stretch out comfortably, especially if you have a sleep partner. · Keep your bedroom quiet, dark, and cool. Use curtains, blinds, or a sleep mask to block out light. To block out noise, use earplugs, soothing music, or a \"white noise\" machine. Your evening and bedtime routine  · Create a relaxing bedtime routine. You might want to take a warm shower or bath, listen to soothing music, or drink a cup of noncaffeinated tea. · Go to bed at the same time every night. And get up at the same time every morning, even if you feel tired. What to avoid  · Limit caffeine (coffee, tea, caffeinated sodas) during the day, and don't have any for at least 4 to 6 hours before bedtime.   · Don't drink alcohol before bedtime. Alcohol can cause you to wake up more often during the night. · Don't smoke or use tobacco, especially in the evening. Nicotine can keep you awake. · Don't take naps during the day, especially close to bedtime. · Don't lie in bed awake for too long. If you can't fall asleep, or if you wake up in the middle of the night and can't get back to sleep within 15 minutes or so, get out of bed and go to another room until you feel sleepy. · Don't take medicine right before bed that may keep you awake or make you feel hyper or energized. Your doctor can tell you if your medicine may do this and if you can take it earlier in the day. If you can't sleep  · Imagine yourself in a peaceful, pleasant scene. Focus on the details and feelings of being in a place that is relaxing. · Get up and do a quiet or boring activity until you feel sleepy. · Don't drink any liquids after 6 p.m. if you wake up often because you have to go to the bathroom. Where can you learn more? Go to https://YoQueVos.Fliptu. org and sign in to your SeptRx account. Enter Y404 in the YooLotto box to learn more about \"Learning About Sleeping Well. \"     If you do not have an account, please click on the \"Sign Up Now\" link. Current as of: July 26, 2016  Content Version: 11.3  © 6556-4674 [employer]. Care instructions adapted under license by [facility] ([facility]). If you have questions about a medical condition or this instruction, always ask your healthcare professional. [place] [address] any warranty or liability for your use of this information. Patient Education        Insomnia: Care Instructions  Your Care Instructions  Insomnia is the inability to sleep well. It is a common problem for most people at some time. Insomnia may make it hard for you to get to sleep, stay asleep, or sleep as long as you need to. This can make you tired and grouchy during the day.  It can also make you forgetful, less effective at work, and unhappy. Insomnia can be caused by conditions such as depression or anxiety. Pain can also affect your ability to sleep. When these problems are solved, the insomnia usually clears up. But sometimes bad sleep habits can cause insomnia. If insomnia is affecting your work or your enjoyment of life, you can take steps to improve your sleep. Follow-up care is a key part of your treatment and safety. Be sure to make and go to all appointments, and call your doctor if you are having problems. It's also a good idea to know your test results and keep a list of the medicines you take. How can you care for yourself at home? What to avoid  · Do not have drinks with caffeine, such as coffee or black tea, for 8 hours before bed. · Do not smoke or use other types of tobacco near bedtime. Nicotine is a stimulant and can keep you awake. · Avoid drinking alcohol late in the evening, because it can cause you to wake in the middle of the night. · Do not eat a big meal close to bedtime. If you are hungry, eat a light snack. · Do not drink a lot of water close to bedtime, because the need to urinate may wake you up during the night. · Do not read or watch TV in bed. Use the bed only for sleeping and sexual activity. What to try  · Go to bed at the same time every night, and wake up at the same time every morning. Do not take naps during the day. · Keep your bedroom quiet, dark, and cool. · Sleep on a comfortable pillow and mattress. · If watching the clock makes you anxious, turn it facing away from you so you cannot see the time. · If you worry when you lie down, start a worry book. Well before bedtime, write down your worries, and then set the book and your concerns aside. · Try meditation or other relaxation techniques before you go to bed. · If you cannot fall asleep, get up and go to another room until you feel sleepy. Do something relaxing.  Repeat your bedtime routine before you go to bed again. · Make your house quiet and calm about an hour before bedtime. Turn down the lights, turn off the TV, log off the computer, and turn down the volume on music. This can help you relax after a busy day. When should you call for help? Watch closely for changes in your health, and be sure to contact your doctor if:  · Your efforts to improve your sleep do not work. · Your insomnia gets worse. · You have been feeling down, depressed, or hopeless or have lost interest in things that you usually enjoy. Where can you learn more? Go to https://Reva SystemspeChannelsoft [employer] Technology.[employer] (Group). org and sign in to your [email or web address] account. Enter P513 in the Optifreeze box to learn more about \"Insomnia: Care Instructions. \"     If you do not have an account, please click on the \"Sign Up Now\" link. Current as of: July 26, 2016  Content Version: 11.3  © 7594-6380 [employer]. Care instructions adapted under license by [facility] ([facility]). If you have questions about a medical condition or this instruction, always ask your healthcare professional. [place] [address] any warranty or liability for your use of this information.

## 2017-11-30 NOTE — PROGRESS NOTES
Chief Complaint   Patient presents with    Hypertension    Insomnia    Chest Pain     2 days ago    Discuss Medications       Griselda Gable  here today for follow up on chronic medical problems, go over labs and/or diagnostic studies, and medication refills. Hypertension; Insomnia; Chest Pain (2 days ago); and Discuss Medications      Hypertension: Patient here for follow-up of elevated blood pressure. He is not exercising, But has been trying to be active, and is adherent to low salt diet. Blood pressure is not well controlled at home. Cardiac symptoms chest pain and fatigue. Patient denies claudication, dyspnea, irregular heart beat, lower extremity edema, near-syncope, orthopnea, palpitations, paroxysmal nocturnal dyspnea, syncope and tachypnea. Cardiovascular risk factors: dyslipidemia, hypertension, male gender, obesity (BMI >= 30 kg/m2) and smoking/ tobacco exposure. Use of agents associated with hypertension: NSAIDS. History of target organ damage: none. Patient says he had a chest pain about 2 days ago, radiating to both arms, went away by itself. He was to have stress test, didn't do it yet. Borderline high BP. Says BPs are high at home    BP Readings from Last 3 Encounters:   11/30/17 134/86   10/10/17 136/86   09/20/17 (!) 156/83      Mild tachycardia   Pulse Readings from Last 3 Encounters:   11/30/17 101   10/10/17 84   09/20/17 (S) 77     . there is unintentional weight gain and fluctuating weight    Wt Readings from Last 3 Encounters:   11/30/17 (!) 464 lb 9.6 oz (210.7 kg)   10/10/17 (!) 453 lb (205.5 kg)   06/30/17 (!) 460 lb (208.7 kg)       Hyperlipidemia  Says he never started lipitor, never received from the pharmacy          Depression and Anxiety. Patient complains of depression. She complains of anhedonia, depressed mood, difficulty concentrating, fatigue, feelings of worthlessness/guilt, hopelessness, insomnia and weight gain.    Patient complains of anxiety disorder and sleep disturbance. She has the following anxiety symptoms: chest pain, difficulty concentrating, fatigue, feelings of losing control, insomnia, irritable, racing thoughts. Patient reports he continues to feel very depressed. He was to have an appointment today [address], but somehow he cannot find his wallet. He believes his dog might have taken his wallet. Buspar made him dizzy. Tolerates Wellbutrin and says it helps him with cravings. Denies suicidal ideation, plan or intent. He did meet with Ruth Dickens, our [address] [place] [address] Consultant who diagnosed him with intermittent explosive disorder in adult, mood disorder, and social phobia. He was referred for further to the [facility]. Severe depression    PHQ Scores 10/10/2017   PHQ2 Score 5   PHQ9 Score 22     Interpretation of Total Score Depression Severity: 1-4 = Minimal depression, 5-9 = Mild depression, 10-14 = Moderate depression, 15-19 = Moderately severe depression, 20-27 = Severe depression    -vital signs stable and within normal limits except borderline BP, mild tachycardia, Morbid obesity per BMI, mildly low pulse ox     /86   Pulse 101   Temp 98.1 °F (36.7 °C) (Tympanic)   Ht 6' 10\" (2.083 m)   Wt (!) 464 lb 9.6 oz (210.7 kg)   SpO2 95% Comment: ra @ rest  BMI 48.58 kg/m²   Body mass index is 48.58 kg/m². Discussed testing with the patient and all questions fully answered. Mild Leukocytosis   hyperglycemia  Prediabetes resolved, taking Metformin    Didn't do the US liver and he declines to do it, he just got his medical insurance back and he is not sure about bills. Liver function tests are within normal limits , I agree with patient.     Hospital Outpatient Visit on 10/24/2017   Component Date Value Ref Range Status    WBC 10/24/2017 12.0* 3.5 - 11.0 k/uL Final    RBC 10/24/2017 5.39  4.5 - 5.9 m/uL Final    Hemoglobin 10/24/2017 15.8  13.5 - 17.5 g/dL Final    Hematocrit 10/24/2017 47.4  41 - 53 % Final    [facility]: Female     Other Topics Concern    Not on file     Social History Narrative    ** Merged History Encounter **          Counseling given: Yes        Family History   Problem Relation Age of Onset    Diabetes Mother     Bipolar Disorder Mother     Diabetes Father     Diabetes Maternal Grandmother     Diabetes Maternal Grandfather     Cancer Maternal Grandfather     Alzheimer's Disease Maternal Grandfather     Diabetes Paternal Grandmother     Heart Disease Paternal Grandmother 54     Approximate age   Leanne Huitron Diabetes Paternal Grandfather              The patient's past medical, surgical, social, and family history as well as his current medications and allergies were reviewed as documented in today's encounter. Rest of complaints with corresponding details per ROS    Review of Systems   Constitutional: Positive for fatigue and unexpected weight change. Negative for activity change, appetite change, chills, diaphoresis and fever. Respiratory: Negative for cough, chest tightness, shortness of breath and wheezing. Cardiovascular: Positive for chest pain (on and off,last time was 2 days ago). Negative for palpitations and leg swelling. Gastrointestinal: Negative for abdominal distention, abdominal pain, constipation, diarrhea, nausea and vomiting. Endocrine: Positive for polyphagia. Negative for cold intolerance, heat intolerance, polydipsia and polyuria. Psychiatric/Behavioral: Positive for decreased concentration, dysphoric mood and sleep disturbance. Negative for self-injury and suicidal ideas. The patient is nervous/anxious. Physical Exam   Constitutional: He is oriented to person, place, and time. He appears well-developed and well-nourished. No distress. HENT:   Head: Normocephalic and atraumatic. Mouth/Throat: Oropharynx is clear and moist.   Eyes: Conjunctivae and EOM are normal. Right eye exhibits no discharge. Left eye exhibits no discharge. No scleral icterus. Neck: Normal range of motion. Neck supple. No thyromegaly present. Cardiovascular: Normal rate, regular rhythm, normal heart sounds and intact distal pulses. No murmur heard. Pulmonary/Chest: Effort normal and breath sounds normal. No respiratory distress. He has no wheezes. He has no rales. He exhibits no tenderness. Abdominal: Soft. Bowel sounds are normal. He exhibits no distension. There is no tenderness. Obese abdomen. Musculoskeletal: Normal range of motion. He exhibits no edema or tenderness. Neurological: He is alert and oriented to person, place, and time. No cranial nerve deficit. He exhibits normal muscle tone. Coordination normal.   Skin: Skin is warm and dry. No rash noted. He is not diaphoretic. Psychiatric: His behavior is normal. Judgment and thought content normal. His mood appears anxious. His affect is labile. He exhibits a depressed mood. Nursing note and vitals reviewed. ASSESSMENT AND PLAN      1. Essential hypertension    - start cloNIDine (CATAPRES) 0.1 MG tablet; Take 1 tablet by mouth 2 times daily  Dispense: 60 tablet; Refill: 3  - Basic Metabolic Panel; Future  Discussed low salt diet and BP and pulse monitoring daily, BP log given  Continue current treatment: Lisinopril and HCTZ    2. Chronic fatigue  multifactorial   Improve sleep start trazodone, start clonidine. Patient promises me he will try to reschedule the appointment with psychiatrist at [address] [place] [address].    3. Psychophysiological insomnia  -Start traZODone (DESYREL) 50 MG tablet; Take 1 tablet by mouth nightly as needed for Sleep  Dispense: 30 tablet; Refill: 1    4. Severe episode of recurrent major depressive disorder, without psychotic features ([facility] [address]    - Start cloNIDine (CATAPRES) 0.1 MG tablet; Take 1 tablet by mouth 2 times daily  Dispense: 60 tablet; Refill: 3  -Start traZODone (DESYREL) 50 MG tablet; Take 1 tablet by mouth nightly as needed for Sleep  Dispense: 30 tablet;  Refill: 1  -Dose increased buPROPion (WELLBUTRIN XL) 300 MG extended release tablet; Take 1 tablet by mouth every morning **Dose increased 11/30/2017 **  Dispense: 30 tablet; Refill: 3  Patient tells me he is going to try to reschedule the appointment with psychiatrist at [address] if he finds his wallet    5. Hyperlipidemia with target LDL less than 100    - start atorvastatin (LIPITOR) 10 MG tablet; Take 1 tablet by mouth daily  Dispense: 30 tablet; Refill: 3    6. Anxiety    -start  cloNIDine (CATAPRES) 0.1 MG tablet; Take 1 tablet by mouth 2 times daily  Dispense: 60 tablet; Refill: 3  -Dose increased   buPROPion (WELLBUTRIN XL) 300 MG extended release tablet; Take 1 tablet by mouth every morning **Dose increased 11/30/2017 **  Dispense: 30 tablet; Refill: 3    7. Prediabetes  improved from prior   Prior A1c on 5/31/17 was 6, currently prediabetes resolved. Low carb, low fat diet, increase fruits and vegetables, and exercise 4-5 times a week 30-40 minutes a day, or walk 1-2 hours per day, or wear a pedometer and get at least 10,000 steps per day. 8. Atypical chest pain    Reprinted stress test and advised to have done. Orders Placed This Encounter   Procedures    Basic Metabolic Panel     Standing Status:   Future     Standing Expiration Date:   11/30/2018         Medications Discontinued During This Encounter   Medication Reason    busPIRone (BUSPAR) 10 MG tablet Patient Choice    fluticasone (FLONASE) 50 MCG/ACT nasal spray Patient Choice    atorvastatin (LIPITOR) 40 MG tablet Dose adjustment    buPROPion (WELLBUTRIN XL) 150 MG extended release tablet Reorder       Caroline Penny received counseling on the following healthy behaviors: nutrition, exercise and medication adherence  Reviewed prior labs and health maintenance  Continue current medications, diet and exercise. Discussed use, benefit, and side effects of prescribed medications. Barriers to medication compliance addressed.    Patient given educational materials - see patient instructions  Was a self-tracking handout given in paper form or via gumit? Yes    Requested Prescriptions     Signed Prescriptions Disp Refills    atorvastatin (LIPITOR) 10 MG tablet 30 tablet 3     Sig: Take 1 tablet by mouth daily    cloNIDine (CATAPRES) 0.1 MG tablet 60 tablet 3     Sig: Take 1 tablet by mouth 2 times daily    traZODone (DESYREL) 50 MG tablet 30 tablet 1     Sig: Take 1 tablet by mouth nightly as needed for Sleep    buPROPion (WELLBUTRIN XL) 300 MG extended release tablet 30 tablet 3     Sig: Take 1 tablet by mouth every morning **Dose increased 11/30/2017 **       All patient questions answered. Patient voiced understanding. Quality Measures    Body mass index is 48.58 kg/m². Elevated. Weight control planned discussed conventional weight loss and Healthy diet and regular exercise. BP: 134/86 Blood pressure is normal. Treatment plan consists of Weight Reduction, DASH Eating Plan, Dietary Sodium Restriction, Increased Physical Activity, Moderation in Alcohol Consumption and Patient In-home Blood Pressure Monitoring. LDL controlled  Lab Results   Component Value Date    LDLCHOLESTEROL 78 05/31/2017    (goal LDL reduction with dx if diabetes is 50% LDL reduction)        Severe depression, has appointment with [facility]    PHQ Scores 10/10/2017   PHQ2 Score 5   PHQ9 Score 22     Interpretation of Total Score Depression Severity: 1-4 = Minimal depression, 5-9 = Mild depression, 10-14 = Moderate depression, 15-19 = Moderately severe depression, 20-27 = Severe depression        The patient's past medical, surgical, social, and family history as well as his   current medications and allergies were reviewed as documented in today's encounter. Medications, labs, diagnostic studies, consultations and follow-up as documented in this encounter. Return in about 3 months (around 2/28/2018) for HTN, LABS F/U. Patient was seen with total face to face time of  25 minutes.  More than 50% of this visit was counseling and education. Future Appointments  Date Time Provider Sejal Romero   1/10/2018 1:30 PM [facility]        This note was completed by using the assistance of a speech-recognition program. However, inadvertent computerized transcription errors may be present. Although every effort was made to ensure accuracy, no guarantees can be provided that every mistake has been identified and corrected by editing.     Electronically signed by Carloz Snow MD on 11/30/2017 at 3:25 PM

## 2017-11-30 NOTE — PROGRESS NOTES
HYPERTENSION visit     BP Readings from Last 3 Encounters:   10/10/17 136/86   09/20/17 (!) 156/83   06/30/17 (!) 118/55       LDL Cholesterol (mg/dL)   Date Value   05/31/2017 78     HDL (mg/dL)   Date Value   05/31/2017 44     BUN (mg/dL)   Date Value   10/24/2017 8     CREATININE (mg/dL)   Date Value   10/24/2017 0.83     Glucose (mg/dL)   Date Value   10/24/2017 126 (H)              Have you changed or started any medications since your last visit including any over-the-counter medicines, vitamins, or herbal medicines? no   Have you stopped taking any of your medications? Is so, why? -  no  Are you having any side effects from any of your medications? - no    Have you seen any other physician or provider since your last visit?  no   Have you had any other diagnostic tests since your last visit?  no   Have you been seen in the emergency room and/or had an admission in a hospital since we last saw you?  no   Have you had your routine dental cleaning in the past 6 months?  no     Do you have an active Yoogaiahart account? If no, what is the barrier?   Yes    Patient Care Team:  Dillon Horton MD as PCP - General (Family Medicine)  TITO Ying as  (Licensed Clinical )    Medical History Review  Past Medical, Family, and Social History reviewed and does contribute to the patient presenting condition    Health Maintenance   Topic Date Due    DTaP/Tdap/Td vaccine (2 - Td) 12/28/2026    Flu vaccine  Completed    HIV screen  Completed

## 2018-02-01 DIAGNOSIS — I10 ESSENTIAL HYPERTENSION: ICD-10-CM

## 2018-02-01 DIAGNOSIS — F33.2 SEVERE EPISODE OF RECURRENT MAJOR DEPRESSIVE DISORDER, WITHOUT PSYCHOTIC FEATURES (HCC): ICD-10-CM

## 2018-02-01 DIAGNOSIS — M79.89 BILATERAL SWELLING OF FEET: ICD-10-CM

## 2018-02-01 DIAGNOSIS — F51.04 PSYCHOPHYSIOLOGICAL INSOMNIA: ICD-10-CM

## 2018-02-02 RX ORDER — TRAZODONE HYDROCHLORIDE 50 MG/1
TABLET ORAL
Qty: 90 TABLET | Refills: 0 | Status: SHIPPED | OUTPATIENT
Start: 2018-02-02 | End: 2018-03-29

## 2018-02-02 RX ORDER — LISINOPRIL 40 MG/1
TABLET ORAL
Qty: 90 TABLET | Refills: 1 | Status: SHIPPED | OUTPATIENT
Start: 2018-02-02 | End: 2018-09-02 | Stop reason: SDUPTHER

## 2018-02-02 NOTE — TELEPHONE ENCOUNTER
Please Approve or Refuse.        Next Visit Date:  Visit date not found    Hemoglobin A1C (%)   Date Value   10/10/2017 5.3   05/31/2017 6.0   12/09/2015 5.6             ( goal A1C is < 7)   No results found for: LABMICR  LDL Cholesterol (mg/dL)   Date Value   05/31/2017 78       (goal LDL is <100)   AST (U/L)   Date Value   10/24/2017 23     ALT (U/L)   Date Value   10/24/2017 23     BUN (mg/dL)   Date Value   10/24/2017 8     BP Readings from Last 3 Encounters:   11/30/17 134/86   10/10/17 136/86   09/20/17 (!) 156/83          (goal 120/80)        Patient Active Problem List:     HTN (hypertension)     Anxiety     Chronic fatigue     Snoring     Prediabetes     Hyperglycemia     RUQ pain     Positive Clay's Sign     Atypical chest pain     Family history of cardiac disorder in paternal grandmother     Ex-smoker     Severe episode of recurrent major depressive disorder, without psychotic features ([address]     Morbid obesity with BMI of 45.0-49.9, adult ([address]     Hyperlipidemia with target LDL less than 100     Hypertriglyceridemia     Intermittent explosive disorder in adult     Psychophysiological insomnia

## 2018-03-02 DIAGNOSIS — I10 ESSENTIAL HYPERTENSION: ICD-10-CM

## 2018-03-02 RX ORDER — HYDROCHLOROTHIAZIDE 25 MG/1
TABLET ORAL
Qty: 90 TABLET | Refills: 3 | Status: SHIPPED | OUTPATIENT
Start: 2018-03-02 | End: 2018-10-18 | Stop reason: SDUPTHER

## 2018-03-11 DIAGNOSIS — R73.03 PREDIABETES: ICD-10-CM

## 2018-03-29 ENCOUNTER — HOSPITAL ENCOUNTER (OUTPATIENT)
Age: 38
Discharge: HOME | End: 2018-03-29
Payer: COMMERCIAL

## 2018-03-29 ENCOUNTER — OFFICE VISIT (OUTPATIENT)
Dept: FAMILY MEDICINE CLINIC | Age: 38
End: 2018-03-29
Payer: COMMERCIAL

## 2018-03-29 VITALS
HEIGHT: 78 IN | BODY MASS INDEX: 36.45 KG/M2 | TEMPERATURE: 98.6 F | DIASTOLIC BLOOD PRESSURE: 94 MMHG | HEART RATE: 93 BPM | WEIGHT: 315 LBS | SYSTOLIC BLOOD PRESSURE: 156 MMHG | OXYGEN SATURATION: 100 %

## 2018-03-29 DIAGNOSIS — F33.2 SEVERE EPISODE OF RECURRENT MAJOR DEPRESSIVE DISORDER, WITHOUT PSYCHOTIC FEATURES (HCC): ICD-10-CM

## 2018-03-29 DIAGNOSIS — R10.11 RUQ PAIN: ICD-10-CM

## 2018-03-29 DIAGNOSIS — R06.81 APNEA: ICD-10-CM

## 2018-03-29 DIAGNOSIS — R06.83 SNORING: ICD-10-CM

## 2018-03-29 DIAGNOSIS — I10 ESSENTIAL HYPERTENSION: Primary | ICD-10-CM

## 2018-03-29 DIAGNOSIS — R73.9 HYPERGLYCEMIA: ICD-10-CM

## 2018-03-29 DIAGNOSIS — F41.9 ANXIETY: ICD-10-CM

## 2018-03-29 DIAGNOSIS — I10 ESSENTIAL HYPERTENSION: ICD-10-CM

## 2018-03-29 LAB
ALBUMIN SERPL-MCNC: 4.5 G/DL (ref 3.5–5.2)
ALBUMIN/GLOBULIN RATIO: ABNORMAL (ref 1–2.5)
ALP BLD-CCNC: 75 U/L (ref 40–129)
ALT SERPL-CCNC: 17 U/L (ref 5–41)
ANION GAP SERPL CALCULATED.3IONS-SCNC: 10 MMOL/L (ref 9–17)
AST SERPL-CCNC: 17 U/L
BILIRUB SERPL-MCNC: 0.75 MG/DL (ref 0.3–1.2)
BUN BLDV-MCNC: 14 MG/DL (ref 6–20)
BUN/CREAT BLD: ABNORMAL (ref 9–20)
CALCIUM SERPL-MCNC: 9.9 MG/DL (ref 8.6–10.4)
CHLORIDE BLD-SCNC: 96 MMOL/L (ref 98–107)
CO2: 30 MMOL/L (ref 20–31)
CREAT SERPL-MCNC: 0.96 MG/DL (ref 0.7–1.2)
GFR AFRICAN AMERICAN: >60 ML/MIN
GFR NON-AFRICAN AMERICAN: >60 ML/MIN
GFR SERPL CREATININE-BSD FRML MDRD: ABNORMAL ML/MIN/{1.73_M2}
GFR SERPL CREATININE-BSD FRML MDRD: ABNORMAL ML/MIN/{1.73_M2}
GLUCOSE BLD-MCNC: 122 MG/DL (ref 70–99)
HCT VFR BLD CALC: 49 % (ref 41–53)
HEMOGLOBIN: 16.2 G/DL (ref 13.5–17.5)
MCH RBC QN AUTO: 28.6 PG (ref 26–34)
MCHC RBC AUTO-ENTMCNC: 33 G/DL (ref 31–37)
MCV RBC AUTO: 86.8 FL (ref 80–100)
NRBC AUTOMATED: ABNORMAL PER 100 WBC
PDW BLD-RTO: 13.6 % (ref 11.5–14.9)
PLATELET # BLD: 273 K/UL (ref 150–450)
PMV BLD AUTO: 8.6 FL (ref 6–12)
POTASSIUM SERPL-SCNC: 4.6 MMOL/L (ref 3.7–5.3)
RBC # BLD: 5.65 M/UL (ref 4.5–5.9)
SODIUM BLD-SCNC: 136 MMOL/L (ref 135–144)
TOTAL PROTEIN: 8.5 G/DL (ref 6.4–8.3)
TSH SERPL DL<=0.05 MIU/L-ACNC: 2.49 MIU/L (ref 0.3–5)
WBC # BLD: 12.1 K/UL (ref 3.5–11)

## 2018-03-29 PROCEDURE — 83036 HEMOGLOBIN GLYCOSYLATED A1C: CPT

## 2018-03-29 PROCEDURE — 36415 COLL VENOUS BLD VENIPUNCTURE: CPT

## 2018-03-29 PROCEDURE — 85027 COMPLETE CBC AUTOMATED: CPT

## 2018-03-29 PROCEDURE — 80053 COMPREHEN METABOLIC PANEL: CPT

## 2018-03-29 PROCEDURE — G8427 DOCREV CUR MEDS BY ELIG CLIN: HCPCS | Performed by: FAMILY MEDICINE

## 2018-03-29 PROCEDURE — G8482 FLU IMMUNIZE ORDER/ADMIN: HCPCS | Performed by: FAMILY MEDICINE

## 2018-03-29 PROCEDURE — G8417 CALC BMI ABV UP PARAM F/U: HCPCS | Performed by: FAMILY MEDICINE

## 2018-03-29 PROCEDURE — 99214 OFFICE O/P EST MOD 30 MIN: CPT | Performed by: FAMILY MEDICINE

## 2018-03-29 PROCEDURE — 96160 PT-FOCUSED HLTH RISK ASSMT: CPT | Performed by: FAMILY MEDICINE

## 2018-03-29 PROCEDURE — 1036F TOBACCO NON-USER: CPT | Performed by: FAMILY MEDICINE

## 2018-03-29 PROCEDURE — 84443 ASSAY THYROID STIM HORMONE: CPT

## 2018-03-29 RX ORDER — ATENOLOL 50 MG/1
50 TABLET ORAL DAILY
Qty: 30 TABLET | Refills: 3 | Status: SHIPPED | OUTPATIENT
Start: 2018-03-29 | End: 2018-08-17 | Stop reason: SDUPTHER

## 2018-03-29 RX ORDER — BUPROPION HYDROCHLORIDE 150 MG/1
150 TABLET ORAL EVERY MORNING
Qty: 30 TABLET | Refills: 3 | Status: SHIPPED | OUTPATIENT
Start: 2018-03-29 | End: 2018-08-31 | Stop reason: SDUPTHER

## 2018-03-29 ASSESSMENT — PATIENT HEALTH QUESTIONNAIRE - PHQ9
1. LITTLE INTEREST OR PLEASURE IN DOING THINGS: 1
SUM OF ALL RESPONSES TO PHQ9 QUESTIONS 1 & 2: 4
8. MOVING OR SPEAKING SO SLOWLY THAT OTHER PEOPLE COULD HAVE NOTICED. OR THE OPPOSITE, BEING SO FIGETY OR RESTLESS THAT YOU HAVE BEEN MOVING AROUND A LOT MORE THAN USUAL: 2
6. FEELING BAD ABOUT YOURSELF - OR THAT YOU ARE A FAILURE OR HAVE LET YOURSELF OR YOUR FAMILY DOWN: 3
2. FEELING DOWN, DEPRESSED OR HOPELESS: 3
10. IF YOU CHECKED OFF ANY PROBLEMS, HOW DIFFICULT HAVE THESE PROBLEMS MADE IT FOR YOU TO DO YOUR WORK, TAKE CARE OF THINGS AT HOME, OR GET ALONG WITH OTHER PEOPLE: 1
4. FEELING TIRED OR HAVING LITTLE ENERGY: 3
5. POOR APPETITE OR OVEREATING: 3
7. TROUBLE CONCENTRATING ON THINGS, SUCH AS READING THE NEWSPAPER OR WATCHING TELEVISION: 2
9. THOUGHTS THAT YOU WOULD BE BETTER OFF DEAD, OR OF HURTING YOURSELF: 1
3. TROUBLE FALLING OR STAYING ASLEEP: 3
SUM OF ALL RESPONSES TO PHQ QUESTIONS 1-9: 21

## 2018-03-29 ASSESSMENT — ANXIETY QUESTIONNAIRES
5. BEING SO RESTLESS THAT IT IS HARD TO SIT STILL: 2-OVER HALF THE DAYS
7. FEELING AFRAID AS IF SOMETHING AWFUL MIGHT HAPPEN: 3-NEARLY EVERY DAY
4. TROUBLE RELAXING: 3-NEARLY EVERY DAY
6. BECOMING EASILY ANNOYED OR IRRITABLE: 3-NEARLY EVERY DAY
1. FEELING NERVOUS, ANXIOUS, OR ON EDGE: 3-NEARLY EVERY DAY
2. NOT BEING ABLE TO STOP OR CONTROL WORRYING: 3-NEARLY EVERY DAY
3. WORRYING TOO MUCH ABOUT DIFFERENT THINGS: 3-NEARLY EVERY DAY
GAD7 TOTAL SCORE: 20

## 2018-03-29 ASSESSMENT — ENCOUNTER SYMPTOMS
WHEEZING: 0
ABDOMINAL PAIN: 1
CHEST TIGHTNESS: 0
DIARRHEA: 1
CONSTIPATION: 1
SHORTNESS OF BREATH: 0
VOMITING: 1
COUGH: 0
ABDOMINAL DISTENTION: 0
NAUSEA: 1

## 2018-03-30 LAB
ESTIMATED AVERAGE GLUCOSE: 123 MG/DL
HBA1C MFR BLD: 5.9 % (ref 4–6)

## 2018-03-30 NOTE — PROGRESS NOTES
Mychart comment sent to patient. Blood glucose 122 mildly high. Slight dehydration. WBC count still slightly high, same as before, await hemoglobin A1c for prediabetes. Otherwise labs within normal limits. Low carb, low fat diet, increase fruits and vegetables, and exercise 4-5 times a week 30-40 minutes a day, or walk 1-2 hours per day, or wear a pedometer and get at least 10,000 steps per day.   Increase fluids

## 2018-04-05 ENCOUNTER — TELEPHONE (OUTPATIENT)
Dept: FAMILY MEDICINE CLINIC | Age: 38
End: 2018-04-05

## 2018-04-05 ENCOUNTER — NURSE ONLY (OUTPATIENT)
Dept: FAMILY MEDICINE CLINIC | Age: 38
End: 2018-04-05
Payer: COMMERCIAL

## 2018-04-05 VITALS — SYSTOLIC BLOOD PRESSURE: 132 MMHG | HEART RATE: 64 BPM | DIASTOLIC BLOOD PRESSURE: 82 MMHG

## 2018-04-05 DIAGNOSIS — I10 ESSENTIAL HYPERTENSION: Primary | ICD-10-CM

## 2018-04-05 DIAGNOSIS — R00.2 PALPITATIONS: ICD-10-CM

## 2018-04-05 DIAGNOSIS — D72.828 OTHER ELEVATED WHITE BLOOD CELL (WBC) COUNT: Primary | ICD-10-CM

## 2018-04-05 DIAGNOSIS — D72.828 OTHER ELEVATED WHITE BLOOD CELL (WBC) COUNT: ICD-10-CM

## 2018-04-05 DIAGNOSIS — J20.9 ACUTE BRONCHITIS DUE TO INFECTION: ICD-10-CM

## 2018-04-05 PROBLEM — D72.829 LEUCOCYTOSIS: Status: ACTIVE | Noted: 2018-04-05

## 2018-04-05 PROBLEM — R06.81 APNEA: Status: ACTIVE | Noted: 2018-04-05

## 2018-04-05 PROCEDURE — 99211 OFF/OP EST MAY X REQ PHY/QHP: CPT | Performed by: FAMILY MEDICINE

## 2018-04-05 RX ORDER — AMOXICILLIN AND CLAVULANATE POTASSIUM 875; 125 MG/1; MG/1
1 TABLET, FILM COATED ORAL 2 TIMES DAILY
Qty: 20 TABLET | Refills: 0 | Status: SHIPPED | OUTPATIENT
Start: 2018-04-05 | End: 2018-04-15

## 2018-04-06 ENCOUNTER — TELEPHONE (OUTPATIENT)
Dept: FAMILY MEDICINE CLINIC | Age: 38
End: 2018-04-06

## 2018-04-11 DIAGNOSIS — F33.2 SEVERE EPISODE OF RECURRENT MAJOR DEPRESSIVE DISORDER, WITHOUT PSYCHOTIC FEATURES (HCC): ICD-10-CM

## 2018-04-11 DIAGNOSIS — E78.5 HYPERLIPIDEMIA WITH TARGET LDL LESS THAN 100: ICD-10-CM

## 2018-04-11 DIAGNOSIS — F41.9 ANXIETY: ICD-10-CM

## 2018-04-12 RX ORDER — BUPROPION HYDROCHLORIDE 300 MG/1
TABLET ORAL
Qty: 30 TABLET | Refills: 5 | Status: SHIPPED | OUTPATIENT
Start: 2018-04-12 | End: 2018-08-31 | Stop reason: SDUPTHER

## 2018-04-12 RX ORDER — ATORVASTATIN CALCIUM 10 MG/1
TABLET, FILM COATED ORAL
Qty: 30 TABLET | Refills: 5 | Status: SHIPPED | OUTPATIENT
Start: 2018-04-12 | End: 2018-10-18 | Stop reason: SDUPTHER

## 2018-08-06 ENCOUNTER — HOSPITAL ENCOUNTER (OUTPATIENT)
Age: 38
Discharge: HOME | End: 2018-08-06
Payer: COMMERCIAL

## 2018-08-06 LAB
ANION GAP SERPL CALCULATED.3IONS-SCNC: 11 MMOL/L (ref 9–17)
BUN BLDV-MCNC: 14 MG/DL (ref 6–20)
BUN/CREAT BLD: ABNORMAL (ref 9–20)
CALCIUM SERPL-MCNC: 9.7 MG/DL (ref 8.6–10.4)
CHLORIDE BLD-SCNC: 100 MMOL/L (ref 98–107)
CHOLESTEROL/HDL RATIO: 3.2
CHOLESTEROL: 132 MG/DL
CO2: 27 MMOL/L (ref 20–31)
CREAT SERPL-MCNC: 0.82 MG/DL (ref 0.7–1.2)
GFR AFRICAN AMERICAN: >60 ML/MIN
GFR NON-AFRICAN AMERICAN: >60 ML/MIN
GFR SERPL CREATININE-BSD FRML MDRD: ABNORMAL ML/MIN/{1.73_M2}
GFR SERPL CREATININE-BSD FRML MDRD: ABNORMAL ML/MIN/{1.73_M2}
GLUCOSE BLD-MCNC: 122 MG/DL (ref 70–99)
HCT VFR BLD CALC: 45.9 % (ref 41–53)
HDLC SERPL-MCNC: 41 MG/DL
HEMOGLOBIN: 15.4 G/DL (ref 13.5–17.5)
LDL CHOLESTEROL: 63 MG/DL (ref 0–130)
MCH RBC QN AUTO: 29.4 PG (ref 26–34)
MCHC RBC AUTO-ENTMCNC: 33.5 G/DL (ref 31–37)
MCV RBC AUTO: 87.6 FL (ref 80–100)
NRBC AUTOMATED: ABNORMAL PER 100 WBC
PDW BLD-RTO: 13.4 % (ref 11.5–14.9)
PLATELET # BLD: 222 K/UL (ref 150–450)
PMV BLD AUTO: 9.1 FL (ref 6–12)
POTASSIUM SERPL-SCNC: 4.3 MMOL/L (ref 3.7–5.3)
RBC # BLD: 5.24 M/UL (ref 4.5–5.9)
SODIUM BLD-SCNC: 138 MMOL/L (ref 135–144)
TRIGL SERPL-MCNC: 139 MG/DL
TSH SERPL DL<=0.05 MIU/L-ACNC: 1.8 MIU/L (ref 0.3–5)
VLDLC SERPL CALC-MCNC: NORMAL MG/DL (ref 1–30)
WBC # BLD: 11.4 K/UL (ref 3.5–11)

## 2018-08-06 PROCEDURE — 80048 BASIC METABOLIC PNL TOTAL CA: CPT

## 2018-08-06 PROCEDURE — 84443 ASSAY THYROID STIM HORMONE: CPT

## 2018-08-06 PROCEDURE — 80061 LIPID PANEL: CPT

## 2018-08-06 PROCEDURE — 85027 COMPLETE CBC AUTOMATED: CPT

## 2018-08-06 PROCEDURE — 36415 COLL VENOUS BLD VENIPUNCTURE: CPT

## 2018-08-17 DIAGNOSIS — I10 ESSENTIAL HYPERTENSION: ICD-10-CM

## 2018-08-17 RX ORDER — ATENOLOL 50 MG/1
TABLET ORAL
Qty: 90 TABLET | Refills: 3 | Status: SHIPPED | OUTPATIENT
Start: 2018-08-17 | End: 2018-10-18 | Stop reason: SDUPTHER

## 2018-08-31 DIAGNOSIS — F33.2 SEVERE EPISODE OF RECURRENT MAJOR DEPRESSIVE DISORDER, WITHOUT PSYCHOTIC FEATURES (HCC): ICD-10-CM

## 2018-08-31 DIAGNOSIS — F41.9 ANXIETY: ICD-10-CM

## 2018-08-31 RX ORDER — BUPROPION HYDROCHLORIDE 150 MG/1
150 TABLET ORAL EVERY MORNING
Qty: 30 TABLET | Refills: 3 | Status: SHIPPED | OUTPATIENT
Start: 2018-08-31 | End: 2018-10-18 | Stop reason: SDUPTHER

## 2018-08-31 RX ORDER — BUPROPION HYDROCHLORIDE 300 MG/1
300 TABLET ORAL EVERY MORNING
Qty: 30 TABLET | Refills: 5 | Status: SHIPPED | OUTPATIENT
Start: 2018-08-31 | End: 2018-10-18 | Stop reason: SDUPTHER

## 2018-09-02 DIAGNOSIS — I10 ESSENTIAL HYPERTENSION: ICD-10-CM

## 2018-09-04 RX ORDER — LISINOPRIL 40 MG/1
TABLET ORAL
Qty: 90 TABLET | Refills: 3 | Status: SHIPPED | OUTPATIENT
Start: 2018-09-04 | End: 2019-05-02 | Stop reason: SDUPTHER

## 2018-09-04 NOTE — TELEPHONE ENCOUNTER
Please Approve or Refuse.   Send to Pharmacy per Pt's Request:      Next Visit Date:  10/18/2018   Last Visit Date: 3/29/2018    Hemoglobin A1C (%)   Date Value   03/29/2018 5.9   10/10/2017 5.3   05/31/2017 6.0             ( goal A1C is < 7)   BP Readings from Last 3 Encounters:   04/05/18 132/82   03/29/18 (!) 156/94   11/30/17 134/86          (goal 120/80)  Lab Results   Component Value Date    BUN 14 08/06/2018     Lab Results   Component Value Date    CREATININE 0.82 08/06/2018     Lab Results   Component Value Date    K 4.3 08/06/2018     @[id number])@

## 2018-10-18 ENCOUNTER — OFFICE VISIT (OUTPATIENT)
Dept: FAMILY MEDICINE CLINIC | Age: 38
End: 2018-10-18
Payer: COMMERCIAL

## 2018-10-18 VITALS
BODY MASS INDEX: 36.45 KG/M2 | HEIGHT: 78 IN | WEIGHT: 315 LBS | OXYGEN SATURATION: 96 % | SYSTOLIC BLOOD PRESSURE: 138 MMHG | HEART RATE: 94 BPM | DIASTOLIC BLOOD PRESSURE: 80 MMHG

## 2018-10-18 DIAGNOSIS — R00.2 PALPITATIONS: ICD-10-CM

## 2018-10-18 DIAGNOSIS — F33.2 SEVERE EPISODE OF RECURRENT MAJOR DEPRESSIVE DISORDER, WITHOUT PSYCHOTIC FEATURES (HCC): ICD-10-CM

## 2018-10-18 DIAGNOSIS — Z13.31 POSITIVE DEPRESSION SCREENING: ICD-10-CM

## 2018-10-18 DIAGNOSIS — M79.18 MUSCULOSKELETAL PAIN: ICD-10-CM

## 2018-10-18 DIAGNOSIS — Z23 NEED FOR IMMUNIZATION AGAINST INFLUENZA: ICD-10-CM

## 2018-10-18 DIAGNOSIS — D22.9 ATYPICAL MOLE: ICD-10-CM

## 2018-10-18 DIAGNOSIS — I10 ESSENTIAL HYPERTENSION: Primary | ICD-10-CM

## 2018-10-18 DIAGNOSIS — E78.5 HYPERLIPIDEMIA WITH TARGET LDL LESS THAN 100: ICD-10-CM

## 2018-10-18 DIAGNOSIS — R73.03 PREDIABETES: ICD-10-CM

## 2018-10-18 DIAGNOSIS — F41.9 ANXIETY: ICD-10-CM

## 2018-10-18 DIAGNOSIS — K21.9 GASTROESOPHAGEAL REFLUX DISEASE WITHOUT ESOPHAGITIS: ICD-10-CM

## 2018-10-18 LAB — HBA1C MFR BLD: 5.9 %

## 2018-10-18 PROCEDURE — G8417 CALC BMI ABV UP PARAM F/U: HCPCS | Performed by: FAMILY MEDICINE

## 2018-10-18 PROCEDURE — 96160 PT-FOCUSED HLTH RISK ASSMT: CPT | Performed by: FAMILY MEDICINE

## 2018-10-18 PROCEDURE — 90686 IIV4 VACC NO PRSV 0.5 ML IM: CPT | Performed by: FAMILY MEDICINE

## 2018-10-18 PROCEDURE — 83036 HEMOGLOBIN GLYCOSYLATED A1C: CPT | Performed by: FAMILY MEDICINE

## 2018-10-18 PROCEDURE — 90471 IMMUNIZATION ADMIN: CPT | Performed by: FAMILY MEDICINE

## 2018-10-18 PROCEDURE — G8427 DOCREV CUR MEDS BY ELIG CLIN: HCPCS | Performed by: FAMILY MEDICINE

## 2018-10-18 PROCEDURE — 99214 OFFICE O/P EST MOD 30 MIN: CPT | Performed by: FAMILY MEDICINE

## 2018-10-18 PROCEDURE — G8431 POS CLIN DEPRES SCRN F/U DOC: HCPCS | Performed by: FAMILY MEDICINE

## 2018-10-18 PROCEDURE — 1036F TOBACCO NON-USER: CPT | Performed by: FAMILY MEDICINE

## 2018-10-18 PROCEDURE — G8482 FLU IMMUNIZE ORDER/ADMIN: HCPCS | Performed by: FAMILY MEDICINE

## 2018-10-18 RX ORDER — BUPROPION HYDROCHLORIDE 150 MG/1
150 TABLET ORAL EVERY MORNING
Qty: 30 TABLET | Refills: 3 | Status: SHIPPED | OUTPATIENT
Start: 2018-10-18 | End: 2019-01-15 | Stop reason: SDUPTHER

## 2018-10-18 RX ORDER — BUPROPION HYDROCHLORIDE 300 MG/1
300 TABLET ORAL EVERY MORNING
Qty: 30 TABLET | Refills: 3 | Status: SHIPPED | OUTPATIENT
Start: 2018-10-18 | End: 2019-03-26 | Stop reason: SDUPTHER

## 2018-10-18 RX ORDER — RANITIDINE 150 MG/1
150 TABLET ORAL 2 TIMES DAILY
Qty: 60 TABLET | Refills: 1 | Status: SHIPPED | OUTPATIENT
Start: 2018-10-18 | End: 2019-03-20 | Stop reason: SDUPTHER

## 2018-10-18 RX ORDER — ATENOLOL 50 MG/1
50 TABLET ORAL DAILY
Qty: 90 TABLET | Refills: 3 | Status: SHIPPED | OUTPATIENT
Start: 2018-10-18 | End: 2019-05-02 | Stop reason: SDUPTHER

## 2018-10-18 RX ORDER — LIDOCAINE 40 MG/G
CREAM TOPICAL
Qty: 120 G | Refills: 3 | Status: SHIPPED | OUTPATIENT
Start: 2018-10-18 | End: 2018-11-14 | Stop reason: SDUPTHER

## 2018-10-18 RX ORDER — HYDROCHLOROTHIAZIDE 25 MG/1
25 TABLET ORAL DAILY
Qty: 90 TABLET | Refills: 3 | Status: SHIPPED | OUTPATIENT
Start: 2018-10-18 | End: 2019-03-26 | Stop reason: SDUPTHER

## 2018-10-18 RX ORDER — RISPERIDONE 0.5 MG/1
0.5 TABLET, FILM COATED ORAL 2 TIMES DAILY
Qty: 60 TABLET | Refills: 0 | Status: SHIPPED | OUTPATIENT
Start: 2018-10-18 | End: 2018-11-10 | Stop reason: SDUPTHER

## 2018-10-18 RX ORDER — IBUPROFEN 800 MG/1
800 TABLET ORAL EVERY 8 HOURS PRN
Qty: 30 TABLET | Refills: 0 | Status: SHIPPED | OUTPATIENT
Start: 2018-10-18 | End: 2019-05-02

## 2018-10-18 RX ORDER — BACLOFEN 10 MG/1
10 TABLET ORAL 3 TIMES DAILY PRN
Qty: 90 TABLET | Refills: 0 | Status: SHIPPED | OUTPATIENT
Start: 2018-10-18 | End: 2018-11-14 | Stop reason: SDUPTHER

## 2018-10-18 RX ORDER — ATORVASTATIN CALCIUM 10 MG/1
10 TABLET, FILM COATED ORAL DAILY
Qty: 90 TABLET | Refills: 3 | Status: SHIPPED | OUTPATIENT
Start: 2018-10-18 | End: 2019-05-02 | Stop reason: SDUPTHER

## 2018-10-18 ASSESSMENT — ANXIETY QUESTIONNAIRES
6. BECOMING EASILY ANNOYED OR IRRITABLE: 3-NEARLY EVERY DAY
GAD7 TOTAL SCORE: 21
3. WORRYING TOO MUCH ABOUT DIFFERENT THINGS: 3-NEARLY EVERY DAY
4. TROUBLE RELAXING: 3-NEARLY EVERY DAY
1. FEELING NERVOUS, ANXIOUS, OR ON EDGE: 3-NEARLY EVERY DAY
5. BEING SO RESTLESS THAT IT IS HARD TO SIT STILL: 3-NEARLY EVERY DAY
2. NOT BEING ABLE TO STOP OR CONTROL WORRYING: 3-NEARLY EVERY DAY
7. FEELING AFRAID AS IF SOMETHING AWFUL MIGHT HAPPEN: 3-NEARLY EVERY DAY

## 2018-10-18 ASSESSMENT — PATIENT HEALTH QUESTIONNAIRE - PHQ9
9. THOUGHTS THAT YOU WOULD BE BETTER OFF DEAD, OR OF HURTING YOURSELF: 3
7. TROUBLE CONCENTRATING ON THINGS, SUCH AS READING THE NEWSPAPER OR WATCHING TELEVISION: 3
SUM OF ALL RESPONSES TO PHQ9 QUESTIONS 1 & 2: 6
10. IF YOU CHECKED OFF ANY PROBLEMS, HOW DIFFICULT HAVE THESE PROBLEMS MADE IT FOR YOU TO DO YOUR WORK, TAKE CARE OF THINGS AT HOME, OR GET ALONG WITH OTHER PEOPLE: 2
SUM OF ALL RESPONSES TO PHQ QUESTIONS 1-9: 27
1. LITTLE INTEREST OR PLEASURE IN DOING THINGS: 3
2. FEELING DOWN, DEPRESSED OR HOPELESS: 3
SUM OF ALL RESPONSES TO PHQ QUESTIONS 1-9: 27
4. FEELING TIRED OR HAVING LITTLE ENERGY: 3
8. MOVING OR SPEAKING SO SLOWLY THAT OTHER PEOPLE COULD HAVE NOTICED. OR THE OPPOSITE, BEING SO FIGETY OR RESTLESS THAT YOU HAVE BEEN MOVING AROUND A LOT MORE THAN USUAL: 3
3. TROUBLE FALLING OR STAYING ASLEEP: 3
5. POOR APPETITE OR OVEREATING: 3
6. FEELING BAD ABOUT YOURSELF - OR THAT YOU ARE A FAILURE OR HAVE LET YOURSELF OR YOUR FAMILY DOWN: 3

## 2018-10-18 ASSESSMENT — ENCOUNTER SYMPTOMS
ABDOMINAL DISTENTION: 0
ABDOMINAL PAIN: 0
VOMITING: 0
NAUSEA: 0
BACK PAIN: 1
CHEST TIGHTNESS: 0
DIARRHEA: 0
COUGH: 0
CONSTIPATION: 0
SHORTNESS OF BREATH: 0
WHEEZING: 0

## 2018-10-18 NOTE — PROGRESS NOTES
Prediabetes stable    Patient admits that he doesn't keep a diet at this time and he is not very active and he doesn't exercise. Lab Results   Component Value Date    LABA1C 5.9 10/18/2018    LABA1C 5.9 03/29/2018    LABA1C 5.3 10/10/2017       Depression and Anxiety. Patient complains of depression. She complains of anhedonia, depressed mood, difficulty concentrating, fatigue, feelings of worthlessness/guilt, hopelessness, insomnia, recurrent thoughts of death and weight gain. Patient complains of anxiety disorder and sleep disturbance. She has the following anxiety symptoms: chest pain, difficulty concentrating, fatigue, insomnia, irritable, palpitations, racing thoughts. Denies current suicidal ideation, plan or intent. Patient tells me he doesn't go to the [facility] psychiatrist anymore because they didn't want to write him a letter that he cannot work and he cannot pay child support anymore, he doesn't have any current suicidal ideation. His girlfriend is very supportive of him. Patient reports he was previously on Buspar , Zoloft, Xanax, and they didn't help. He feels Wellbutrin helps and he has been taking it. There is worsening depression, severe. PHQ Scores 10/18/2018 3/29/2018 10/10/2017   PHQ2 Score 6 4 5   PHQ9 Score 27 21 22        []1-4 = Minimal depression   []5-9 = Mild depression   []10-14 = Moderate depression   []15-19 = Moderately severe depression   [x]20-27 = Severe depression    Severe anxiety  APARNA 7 SCORE 10/18/2018 3/29/2018   APARNA-7 Total Score 21 20     Interpretation of APARNA-7 score: 5-9 = mild anxiety, 10-14 = moderate anxiety, 15+ = severe anxiety. Recommend referral to behavioral health for scores 10 or greater. She reports left posterior shoulder and left upper back pain since yesterday. The pain seems to be worse with activities and certain movements. Onset without apparent reason.   His girlfriend has been using Biofreeze which has been helping a bit.      Patient reports having some bumps on the scalp, which are tender sometimes. He also reports multiple moles all over his body. The largest one is on the left side of the neck. Allergies   Allergen Reactions    Buspar [Buspirone] Other (See Comments)     Buspar made him dizzy        Current Outpatient Prescriptions   Medication Sig Dispense Refill    lisinopril (PRINIVIL;ZESTRIL) 40 MG tablet TAKE ONE TABLET BY MOUTH DAILY 90 tablet 3    buPROPion (WELLBUTRIN XL) 150 MG extended release tablet Take 1 tablet by mouth every morning Takes along with 300 mg. total of 450 MG daily 30 tablet 3    buPROPion (WELLBUTRIN XL) 300 MG extended release tablet Take 1 tablet by mouth every morning Take along with 150 mg, total of 450 mg a day 30 tablet 5    atenolol (TENORMIN) 50 MG tablet TAKE ONE TABLET BY MOUTH DAILY 90 tablet 3    atorvastatin (LIPITOR) 10 MG tablet TAKE ONE TABLET BY MOUTH DAILY 30 tablet 5    metFORMIN (GLUCOPHAGE) 500 MG tablet TAKE ONE TABLET BY MOUTH DAILY WITH BREAKFAST 90 tablet 2    hydrochlorothiazide (HYDRODIURIL) 25 MG tablet TAKE ONE TABLET BY MOUTH DAILY 90 tablet 3     No current facility-administered medications for this visit. Social History     Social History    Marital status: Single     Spouse name: N/A    Number of children: N/A    Years of education: N/A     Occupational History    Not on file.      Social History Main Topics    Smoking status: Former Smoker     Packs/day: 1.25     Years: 23.00     Types: Cigarettes     Quit date: 3/26/2017    Smokeless tobacco: Never Used      Comment: Quit smoking cold turkey    Alcohol use No    Drug use: No    Sexual activity: Yes     Partners: Female     Other Topics Concern    Not on file     Social History Narrative    ** Merged History Encounter **          Counseling given: Yes        The patient's past medical, surgical, social, and family history as well as his current medications and allergies were

## 2018-10-18 NOTE — PATIENT INSTRUCTIONS
Patient Education        Musculoskeletal Pain: Care Instructions  Your Care Instructions    Different problems with the bones, muscles, nerves, ligaments, and tendons in the body can cause pain. One or more areas of your body may ache or burn. Or they may feel tired, stiff, or sore. The medical term for this type of pain is musculoskeletal pain. It can have many different causes. Sometimes the pain is caused by an injury such as a strain or sprain. Or you might have pain from using one part of your body in the same way over and over again. This is called overuse. In some cases, the cause of the pain is another health problem such as arthritis or fibromyalgia. The doctor will examine you and ask you questions about your health to help find the cause of your pain. Blood tests or imaging tests like an X-ray may also be helpful. But sometimes doctors can't find a cause of the pain. Treatment depends on your symptoms and the cause of the pain, if known. The doctor has checked you carefully, but problems can develop later. If you notice any problems or new symptoms, get medical treatment right away. Follow-up care is a key part of your treatment and safety. Be sure to make and go to all appointments, and call your doctor if you are having problems. It's also a good idea to know your test results and keep a list of the medicines you take. How can you care for yourself at home? · Rest until you feel better. · Do not do anything that makes the pain worse. Return to exercise gradually if you feel better and your doctor says it's okay. · Be safe with medicines. Read and follow all instructions on the label. ¨ If the doctor gave you a prescription medicine for pain, take it as prescribed. ¨ If you are not taking a prescription pain medicine, ask your doctor if you can take an over-the-counter medicine. · Put ice or a cold pack on the area for 10 to 20 minutes at a time to ease pain.  Put a thin cloth between the ice and your skin. When should you call for help? Call your doctor now or seek immediate medical care if:    · You have new pain, or your pain gets worse.     · You have new symptoms such as a fever, a rash, or chills.    Watch closely for changes in your health, and be sure to contact your doctor if:    · You do not get better as expected. Where can you learn more? Go to https://[employer]. org and sign in to your [employer] account. Enter G622 in the [employer] box to learn more about \"Musculoskeletal Pain: Care Instructions. \"     If you do not have an account, please click on the \"Sign Up Now\" link. Current as of: October 9, 2017  Content Version: 11.7  © 4146-4581 [employer]. Care instructions adapted under license by [facility] ([facility]). If you have questions about a medical condition or this instruction, always ask your healthcare professional. [address] any warranty or liability for your use of this information.

## 2018-10-22 ENCOUNTER — TELEPHONE (OUTPATIENT)
Dept: FAMILY MEDICINE CLINIC | Age: 38
End: 2018-10-22

## 2018-10-22 NOTE — TELEPHONE ENCOUNTER
Pre-authorization started for Camphor-Menthol-methyl Sal Gel (Flexall Ultra Plus) and sent via [facility]

## 2018-10-24 ENCOUNTER — TELEPHONE (OUTPATIENT)
Dept: FAMILY MEDICINE CLINIC | Age: 38
End: 2018-10-24

## 2018-10-24 PROBLEM — K21.9 GASTROESOPHAGEAL REFLUX DISEASE WITHOUT ESOPHAGITIS: Status: ACTIVE | Noted: 2018-10-24

## 2018-10-24 PROBLEM — D22.9 ATYPICAL MOLE: Status: ACTIVE | Noted: 2018-10-24

## 2018-10-24 PROBLEM — Z13.31 POSITIVE DEPRESSION SCREENING: Status: ACTIVE | Noted: 2018-10-24

## 2018-10-24 NOTE — TELEPHONE ENCOUNTER
Future Appointments  Date Time Provider Sejal Romero   11/6/2018 11:30 AM [facility]   12/7/2018 3:45 PM [facility]

## 2018-11-10 DIAGNOSIS — F41.9 ANXIETY: ICD-10-CM

## 2018-11-10 DIAGNOSIS — F33.2 SEVERE EPISODE OF RECURRENT MAJOR DEPRESSIVE DISORDER, WITHOUT PSYCHOTIC FEATURES (HCC): ICD-10-CM

## 2018-11-12 RX ORDER — RISPERIDONE 0.5 MG/1
TABLET, FILM COATED ORAL
Qty: 60 TABLET | Refills: 0 | Status: SHIPPED | OUTPATIENT
Start: 2018-11-12 | End: 2019-05-02 | Stop reason: SDUPTHER

## 2018-11-13 ENCOUNTER — TELEPHONE (OUTPATIENT)
Dept: FAMILY MEDICINE CLINIC | Age: 38
End: 2018-11-13

## 2018-11-14 ENCOUNTER — HOSPITAL ENCOUNTER (OUTPATIENT)
Dept: GENERAL RADIOLOGY | Age: 38
Discharge: HOME | End: 2018-11-16
Payer: COMMERCIAL

## 2018-11-14 ENCOUNTER — OFFICE VISIT (OUTPATIENT)
Dept: FAMILY MEDICINE CLINIC | Age: 38
End: 2018-11-14
Payer: COMMERCIAL

## 2018-11-14 ENCOUNTER — HOSPITAL ENCOUNTER (OUTPATIENT)
Age: 38
Discharge: HOME | End: 2018-11-16
Payer: COMMERCIAL

## 2018-11-14 VITALS
HEART RATE: 65 BPM | OXYGEN SATURATION: 96 % | BODY MASS INDEX: 36.45 KG/M2 | WEIGHT: 315 LBS | HEIGHT: 78 IN | SYSTOLIC BLOOD PRESSURE: 125 MMHG | DIASTOLIC BLOOD PRESSURE: 82 MMHG

## 2018-11-14 DIAGNOSIS — R20.0 NUMBNESS: ICD-10-CM

## 2018-11-14 DIAGNOSIS — M79.18 MUSCULOSKELETAL PAIN: ICD-10-CM

## 2018-11-14 DIAGNOSIS — M54.42 CHRONIC LEFT-SIDED LOW BACK PAIN WITH LEFT-SIDED SCIATICA: ICD-10-CM

## 2018-11-14 DIAGNOSIS — G89.29 CHRONIC LEFT-SIDED LOW BACK PAIN WITH LEFT-SIDED SCIATICA: ICD-10-CM

## 2018-11-14 DIAGNOSIS — M25.552 PAIN OF LEFT HIP JOINT: Primary | ICD-10-CM

## 2018-11-14 DIAGNOSIS — M25.552 PAIN OF LEFT HIP JOINT: ICD-10-CM

## 2018-11-14 PROCEDURE — G8417 CALC BMI ABV UP PARAM F/U: HCPCS | Performed by: FAMILY MEDICINE

## 2018-11-14 PROCEDURE — 72100 X-RAY EXAM L-S SPINE 2/3 VWS: CPT

## 2018-11-14 PROCEDURE — 99214 OFFICE O/P EST MOD 30 MIN: CPT | Performed by: FAMILY MEDICINE

## 2018-11-14 PROCEDURE — G8427 DOCREV CUR MEDS BY ELIG CLIN: HCPCS | Performed by: FAMILY MEDICINE

## 2018-11-14 PROCEDURE — 1036F TOBACCO NON-USER: CPT | Performed by: FAMILY MEDICINE

## 2018-11-14 PROCEDURE — G8482 FLU IMMUNIZE ORDER/ADMIN: HCPCS | Performed by: FAMILY MEDICINE

## 2018-11-14 RX ORDER — METHYLPREDNISOLONE 4 MG/1
TABLET ORAL
Qty: 1 KIT | Refills: 0 | Status: SHIPPED | OUTPATIENT
Start: 2018-11-14 | End: 2018-11-20

## 2018-11-14 RX ORDER — LIDOCAINE 40 MG/G
CREAM TOPICAL
Qty: 120 G | Refills: 3 | Status: SHIPPED | OUTPATIENT
Start: 2018-11-14 | End: 2019-06-04 | Stop reason: SDUPTHER

## 2018-11-14 RX ORDER — NAPROXEN 500 MG/1
TABLET ORAL
COMMUNITY
Start: 2018-11-13 | End: 2019-05-02

## 2018-11-14 RX ORDER — BACLOFEN 10 MG/1
10 TABLET ORAL 3 TIMES DAILY PRN
Qty: 90 TABLET | Refills: 0 | Status: SHIPPED | OUTPATIENT
Start: 2018-11-14 | End: 2019-05-02 | Stop reason: SDUPTHER

## 2018-11-14 ASSESSMENT — ENCOUNTER SYMPTOMS
RHINORRHEA: 0
DIARRHEA: 0
CONSTIPATION: 0
WHEEZING: 0
ABDOMINAL PAIN: 0
COLOR CHANGE: 0
COUGH: 0
BACK PAIN: 1
SHORTNESS OF BREATH: 0
SINUS PRESSURE: 0

## 2018-11-14 NOTE — PROGRESS NOTES
Visit Information    Have you changed or started any medications since your last visit including any over-the-counter medicines, vitamins, or herbal medicines? yes - NAPROXEN   Have you stopped taking any of your medications? Is so, why? -  no  Are you having any side effects from any of your medications? - no    Have you seen any other physician or provider since your last visit?  no   Have you had any other diagnostic tests since your last visit?  no   Have you been seen in the emergency room and/or had an admission in a hospital since we last saw you?  yes - 11/13/2018   Have you had your routine dental cleaning in the past 6 months?  no     Do you have an active MyChart account? If no, what is the barrier?   No: NOT INTERESTED    Patient Care Team:  Giovanni Baker MD as PCP - General (Family Medicine)  Giovanni Baker MD as PCP - [facility] Attributed Provider  TITO Sanchez as  (Licensed Clinical )    Medical History Review  Past Medical, Family, and Social History reviewed and does contribute to the patient presenting condition    Health Maintenance   Topic Date Due    Potassium monitoring  08/06/2019    Creatinine monitoring  08/06/2019    A1C test (Diabetic or Prediabetic)  10/18/2019    DTaP/Tdap/Td vaccine (2 - Td) 12/28/2026    Flu vaccine  Completed    HIV screen  Completed

## 2018-11-14 NOTE — PROGRESS NOTES
150 mg, total of 450 mg a day 30 tablet 3    atorvastatin (LIPITOR) 10 MG tablet Take 1 tablet by mouth daily 90 tablet 3    metFORMIN (GLUCOPHAGE) 500 MG tablet Take 1 tablet by mouth daily (with breakfast) 90 tablet 2    hydrochlorothiazide (HYDRODIURIL) 25 MG tablet Take 1 tablet by mouth daily 90 tablet 3    atenolol (TENORMIN) 50 MG tablet Take 1 tablet by mouth daily 90 tablet 3    ranitidine (ZANTAC) 150 MG tablet Take 1 tablet by mouth 2 times daily 60 tablet 1    Camphor-Menthol-Methyl Sal 3.1-16-10 % GEL Apply every 8 hours as needed for pain 1 Tube 1    lisinopril (PRINIVIL;ZESTRIL) 40 MG tablet TAKE ONE TABLET BY MOUTH DAILY 90 tablet 3    ibuprofen (ADVIL;MOTRIN) 800 MG tablet Take 1 tablet by mouth every 8 hours as needed for Pain 30 tablet 0     No current facility-administered medications for this visit. Social History     Social History    Marital status: Single     Spouse name: N/A    Number of children: N/A    Years of education: N/A     Occupational History    Not on file.      Social History Main Topics    Smoking status: Former Smoker     Packs/day: 1.25     Years: 23.00     Types: Cigarettes     Quit date: 3/26/2017    Smokeless tobacco: Never Used      Comment: Quit smoking cold turkey    Alcohol use No    Drug use: No    Sexual activity: Yes     Partners: Female     Other Topics Concern    Not on file     Social History Narrative    ** Merged History Encounter **          Counseling given: Not Answered        Family History   Problem Relation Age of Onset    Diabetes Mother     Bipolar Disorder Mother     Diabetes Father     Diabetes Maternal Grandmother     Diabetes Maternal Grandfather     Cancer Maternal Grandfather     Alzheimer's Disease Maternal Grandfather     Diabetes Paternal Grandmother     Heart Disease Paternal Grandmother 54        Approximate age   [facility] Diabetes Paternal Grandfather              -rest of complaints with corresponding details per ROS    The patient's past medical, surgical, social, and family history as well as his current medications and allergies were reviewed as documented intoday's encounter. Review of Systems   Constitutional: Negative for activity change, appetite change, diaphoresis and fever. HENT: Negative for congestion, postnasal drip, rhinorrhea and sinus pressure. Eyes: Negative for visual disturbance. Respiratory: Negative for cough, shortness of breath and wheezing. Cardiovascular: Negative for chest pain, palpitations and leg swelling. Gastrointestinal: Negative for abdominal pain, constipation and diarrhea. Genitourinary: Negative for dysuria, hematuria and urgency. Musculoskeletal: Positive for arthralgias, back pain, gait problem and joint swelling. Skin: Negative for color change and rash. Neurological: Positive for numbness. Negative for dizziness, weakness and headaches. Psychiatric/Behavioral: Negative for agitation, decreased concentration, dysphoric mood and sleep disturbance. The patient is not nervous/anxious. Physical Exam   Constitutional: He appears well-developed and well-nourished. HENT:   Head: Normocephalic and atraumatic. Eyes: Pupils are equal, round, and reactive to light. EOM are normal.   Neck: Normal range of motion. Neck supple. Cardiovascular: Normal rate, regular rhythm and normal heart sounds. Pulmonary/Chest: Effort normal and breath sounds normal.   Musculoskeletal: He exhibits tenderness and deformity. Left hip: He exhibits decreased range of motion, tenderness and bony tenderness. He exhibits no deformity. Lumbar back: He exhibits decreased range of motion, tenderness, bony tenderness, pain and spasm. He exhibits no edema and no deformity. Neurological: He is alert. He has normal strength. A sensory deficit is present. No cranial nerve deficit. Coordination normal.   Skin: Skin is warm.  No abrasion, no bruising, no burn, no lesion and no rash noted. No erythema. Nursing note and vitals reviewed. SLR test is positive on left side, fiber test could not tolerate. No lumbar spine tenderness present. ASSESSMENT AND PLAN      1. Pain of left hip joint  -Discussed with patient pain is likely due to low back pain, muscle relaxant lidocaine with side refer to physical therapy. X-ray back discussed about weight reduction  - baclofen (LIORESAL) 10 MG tablet; Take 1 tablet by mouth 3 times daily as needed (MUSCLE SPASMS) Causes sedation, do not drive while taking this medication  Dispense: 90 tablet; Refill: 0  - lidocaine (LMX) 4 % cream; Apply 2-3 times a day as needed for pain  Dispense: 120 g; Refill: 3  - XR LUMBAR SPINE (2-3 VIEWS); Future  - methylPREDNISolone (MEDROL DOSEPACK) 4 MG tablet; Take by mouth. Dispense: 1 kit; Refill: 0  - [address]    2. Chronic left-sided low back pain with left-sided sciatica  -X-rays and symptomatic treatment, work note given for 4 days  - baclofen (LIORESAL) 10 MG tablet; Take 1 tablet by mouth 3 times daily as needed (MUSCLE SPASMS) Causes sedation, do not drive while taking this medication  Dispense: 90 tablet; Refill: 0  - lidocaine (LMX) 4 % cream; Apply 2-3 times a day as needed for pain  Dispense: 120 g; Refill: 3  - XR LUMBAR SPINE (2-3 VIEWS); Future  - methylPREDNISolone (MEDROL DOSEPACK) 4 MG tablet; Take by mouth. Dispense: 1 kit; Refill: 0  - [address]    3. Numbness  -  - baclofen (LIORESAL) 10 MG tablet; Take 1 tablet by mouth 3 times daily as needed (MUSCLE SPASMS) Causes sedation, do not drive while taking this medication  Dispense: 90 tablet; Refill: 0  - lidocaine (LMX) 4 % cream; Apply 2-3 times a day as needed for pain  Dispense: 120 g; Refill: 3  - XR LUMBAR SPINE (2-3 VIEWS); Future  - methylPREDNISolone (MEDROL DOSEPACK) 4 MG tablet; Take by mouth. Dispense: 1 kit; Refill: 0  - [address]    4. counseling and education. Future Appointments  Date Time Provider Sejal Romero   12/7/2018 3:45 PM [facility]     This note was completed by using the assistance of a speech-recognition program. However, inadvertent computerized transcription errors may be present. Althoughevery effort was made to ensure accuracy, no guarantees can be provided that every mistake has been identified and corrected by editing.   Electronically signed by Leandro Casanova MD on 11/14/2018  1:25 PM

## 2018-11-20 ENCOUNTER — TELEPHONE (OUTPATIENT)
Dept: FAMILY MEDICINE CLINIC | Age: 38
End: 2018-11-20

## 2019-01-15 DIAGNOSIS — F41.9 ANXIETY: ICD-10-CM

## 2019-01-15 DIAGNOSIS — F33.2 SEVERE EPISODE OF RECURRENT MAJOR DEPRESSIVE DISORDER, WITHOUT PSYCHOTIC FEATURES (HCC): ICD-10-CM

## 2019-01-15 RX ORDER — BUPROPION HYDROCHLORIDE 150 MG/1
TABLET ORAL
Qty: 30 TABLET | Refills: 1 | Status: SHIPPED | OUTPATIENT
Start: 2019-01-15 | End: 2019-03-26 | Stop reason: SDUPTHER

## 2019-03-19 DIAGNOSIS — K21.9 GASTROESOPHAGEAL REFLUX DISEASE WITHOUT ESOPHAGITIS: ICD-10-CM

## 2019-03-19 RX ORDER — RANITIDINE 150 MG/1
TABLET ORAL
Qty: 60 TABLET | Refills: 0 | OUTPATIENT
Start: 2019-03-19

## 2019-03-20 RX ORDER — RANITIDINE 150 MG/1
150 TABLET ORAL 2 TIMES DAILY
Qty: 60 TABLET | Refills: 1 | Status: SHIPPED | OUTPATIENT
Start: 2019-03-20 | End: 2019-05-02 | Stop reason: SDUPTHER

## 2019-03-26 DIAGNOSIS — F41.9 ANXIETY: ICD-10-CM

## 2019-03-26 DIAGNOSIS — F33.2 SEVERE EPISODE OF RECURRENT MAJOR DEPRESSIVE DISORDER, WITHOUT PSYCHOTIC FEATURES (HCC): ICD-10-CM

## 2019-03-26 DIAGNOSIS — I10 ESSENTIAL HYPERTENSION: ICD-10-CM

## 2019-03-27 RX ORDER — HYDROCHLOROTHIAZIDE 25 MG/1
TABLET ORAL
Qty: 30 TABLET | Refills: 0 | Status: SHIPPED | OUTPATIENT
Start: 2019-03-27 | End: 2019-03-30 | Stop reason: SDUPTHER

## 2019-03-27 RX ORDER — BUPROPION HYDROCHLORIDE 300 MG/1
TABLET ORAL
Qty: 30 TABLET | Refills: 3 | Status: SHIPPED | OUTPATIENT
Start: 2019-03-27 | End: 2019-05-02 | Stop reason: SDUPTHER

## 2019-03-27 RX ORDER — BUPROPION HYDROCHLORIDE 150 MG/1
TABLET ORAL
Qty: 30 TABLET | Refills: 0 | Status: SHIPPED | OUTPATIENT
Start: 2019-03-27 | End: 2019-03-30 | Stop reason: SDUPTHER

## 2019-03-30 DIAGNOSIS — F33.2 SEVERE EPISODE OF RECURRENT MAJOR DEPRESSIVE DISORDER, WITHOUT PSYCHOTIC FEATURES (HCC): ICD-10-CM

## 2019-03-30 DIAGNOSIS — F41.9 ANXIETY: ICD-10-CM

## 2019-03-30 DIAGNOSIS — I10 ESSENTIAL HYPERTENSION: ICD-10-CM

## 2019-04-01 RX ORDER — BUPROPION HYDROCHLORIDE 150 MG/1
TABLET ORAL
Qty: 30 TABLET | Refills: 0 | Status: SHIPPED | OUTPATIENT
Start: 2019-04-01 | End: 2019-05-02 | Stop reason: SDUPTHER

## 2019-04-01 RX ORDER — HYDROCHLOROTHIAZIDE 25 MG/1
TABLET ORAL
Qty: 30 TABLET | Refills: 0 | Status: SHIPPED | OUTPATIENT
Start: 2019-04-01 | End: 2019-06-04 | Stop reason: SDUPTHER

## 2019-04-01 NOTE — TELEPHONE ENCOUNTER
Please Approve or Refuse.   Send to Pharmacy per Pt's Request:      Next Visit Date:  4/17/2019   Last Visit Date: 11/14/2018    Hemoglobin A1C (%)   Date Value   10/18/2018 5.9   03/29/2018 5.9   10/10/2017 5.3             ( goal A1C is < 7)   BP Readings from Last 3 Encounters:   11/14/18 125/82   10/18/18 138/80   04/05/18 132/82          (goal 120/80)  BUN   Date Value Ref Range Status   08/06/2018 14 6 - 20 mg/dL Final     CREATININE   Date Value Ref Range Status   08/06/2018 0.82 0.70 - 1.20 mg/dL Final     Potassium   Date Value Ref Range Status   08/06/2018 4.3 3.7 - 5.3 mmol/L Final

## 2019-05-02 ENCOUNTER — OFFICE VISIT (OUTPATIENT)
Dept: FAMILY MEDICINE CLINIC | Age: 39
End: 2019-05-02
Payer: COMMERCIAL

## 2019-05-02 VITALS
HEART RATE: 78 BPM | WEIGHT: 315 LBS | DIASTOLIC BLOOD PRESSURE: 82 MMHG | TEMPERATURE: 98.7 F | HEIGHT: 78 IN | SYSTOLIC BLOOD PRESSURE: 134 MMHG | BODY MASS INDEX: 36.45 KG/M2

## 2019-05-02 DIAGNOSIS — I10 ESSENTIAL HYPERTENSION: Primary | ICD-10-CM

## 2019-05-02 DIAGNOSIS — E66.01 MORBID OBESITY WITH BMI OF 45.0-49.9, ADULT (HCC): ICD-10-CM

## 2019-05-02 DIAGNOSIS — R73.03 PREDIABETES: ICD-10-CM

## 2019-05-02 DIAGNOSIS — K21.9 GASTROESOPHAGEAL REFLUX DISEASE WITHOUT ESOPHAGITIS: ICD-10-CM

## 2019-05-02 DIAGNOSIS — E78.5 HYPERLIPIDEMIA WITH TARGET LDL LESS THAN 100: ICD-10-CM

## 2019-05-02 DIAGNOSIS — F41.9 ANXIETY: ICD-10-CM

## 2019-05-02 DIAGNOSIS — F33.2 SEVERE EPISODE OF RECURRENT MAJOR DEPRESSIVE DISORDER, WITHOUT PSYCHOTIC FEATURES (HCC): ICD-10-CM

## 2019-05-02 LAB — HBA1C MFR BLD: 5.8 %

## 2019-05-02 PROCEDURE — 99214 OFFICE O/P EST MOD 30 MIN: CPT | Performed by: FAMILY MEDICINE

## 2019-05-02 PROCEDURE — 1036F TOBACCO NON-USER: CPT | Performed by: FAMILY MEDICINE

## 2019-05-02 PROCEDURE — 83036 HEMOGLOBIN GLYCOSYLATED A1C: CPT | Performed by: FAMILY MEDICINE

## 2019-05-02 PROCEDURE — G8417 CALC BMI ABV UP PARAM F/U: HCPCS | Performed by: FAMILY MEDICINE

## 2019-05-02 PROCEDURE — G8427 DOCREV CUR MEDS BY ELIG CLIN: HCPCS | Performed by: FAMILY MEDICINE

## 2019-05-02 RX ORDER — BACLOFEN 10 MG/1
10 TABLET ORAL 3 TIMES DAILY PRN
Qty: 30 TABLET | Refills: 0 | Status: SHIPPED | OUTPATIENT
Start: 2019-05-02 | End: 2019-06-04 | Stop reason: SDUPTHER

## 2019-05-02 RX ORDER — LISINOPRIL 40 MG/1
TABLET ORAL
Qty: 90 TABLET | Refills: 0 | Status: SHIPPED | OUTPATIENT
Start: 2019-05-02 | End: 2019-06-04 | Stop reason: SDUPTHER

## 2019-05-02 RX ORDER — RANITIDINE 150 MG/1
150 TABLET ORAL 2 TIMES DAILY
Qty: 60 TABLET | Refills: 0 | Status: SHIPPED | OUTPATIENT
Start: 2019-05-02 | End: 2019-06-04 | Stop reason: SDUPTHER

## 2019-05-02 RX ORDER — BUPROPION HYDROCHLORIDE 300 MG/1
TABLET ORAL
Qty: 90 TABLET | Refills: 0 | Status: SHIPPED | OUTPATIENT
Start: 2019-05-02 | End: 2019-06-04 | Stop reason: SDUPTHER

## 2019-05-02 RX ORDER — ATENOLOL 50 MG/1
50 TABLET ORAL DAILY
Qty: 90 TABLET | Refills: 0 | Status: SHIPPED | OUTPATIENT
Start: 2019-05-02 | End: 2019-06-04 | Stop reason: SDUPTHER

## 2019-05-02 RX ORDER — BUPROPION HYDROCHLORIDE 150 MG/1
TABLET ORAL
Qty: 90 TABLET | Refills: 0 | Status: SHIPPED | OUTPATIENT
Start: 2019-05-02 | End: 2019-06-04 | Stop reason: SDUPTHER

## 2019-05-02 RX ORDER — ATORVASTATIN CALCIUM 10 MG/1
10 TABLET, FILM COATED ORAL DAILY
Qty: 90 TABLET | Refills: 0 | Status: SHIPPED | OUTPATIENT
Start: 2019-05-02 | End: 2019-06-04 | Stop reason: SDUPTHER

## 2019-05-02 RX ORDER — RISPERIDONE 0.5 MG/1
TABLET, FILM COATED ORAL
Qty: 60 TABLET | Refills: 0 | Status: SHIPPED | OUTPATIENT
Start: 2019-05-02 | End: 2019-06-04 | Stop reason: SDUPTHER

## 2019-05-02 ASSESSMENT — ENCOUNTER SYMPTOMS
COUGH: 0
NAUSEA: 0
ABDOMINAL PAIN: 0
SHORTNESS OF BREATH: 0
SORE THROAT: 0

## 2019-05-02 NOTE — PROGRESS NOTES
on Tenormin and Zestril denies of any chest pain or shortness of breath has got anxiety is on Wellbutrin and Risperdal also is prediabetic and is taking metformin is not following his diet has gained weight  Review of Systems   Constitutional: Positive for unexpected weight change. Negative for appetite change. HENT: Negative for ear pain, postnasal drip and sore throat. Eyes: Positive for visual disturbance. Wears glasses   Respiratory: Negative for cough and shortness of breath. Cardiovascular: Negative for chest pain and leg swelling. Gastrointestinal: Negative for abdominal pain and nausea. Endocrine: Negative for polydipsia and polyuria. Genitourinary: Negative for frequency and urgency. Musculoskeletal: Negative for arthralgias. Neurological: Negative for dizziness and headaches. Objective:   Physical Exam   Constitutional: He is oriented to person, place, and time. He appears well-developed and well-nourished. /82 (Site: Right Upper Arm, Position: Sitting, Cuff Size: Large Adult)   Pulse 78   Temp 98.7 °F (37.1 °C) (Oral)   Ht 6' 11\" (2.108 m)   Wt (!) 504 lb (228.6 kg)   BMI 51.44 kg/m²    Morbid obese male   HENT:   Head: Normocephalic. Mouth/Throat: Oropharynx is clear and moist.   Eyes: Conjunctivae are normal.   Cardiovascular: Normal rate and regular rhythm. Pulmonary/Chest: Breath sounds normal. He has no rales. Abdominal: Soft. There is no tenderness. Musculoskeletal: He exhibits no edema. Lymphadenopathy:     He has no cervical adenopathy. Neurological: He is alert and oriented to person, place, and time. Nursing note and vitals reviewed. Hemoglobin A1c is 5.8  Assessment:       Diagnosis Orders   1. Essential hypertension  atenolol (TENORMIN) 50 MG tablet    lisinopril (PRINIVIL;ZESTRIL) 40 MG tablet    Comprehensive Metabolic Panel   2. Prediabetes  POCT glycosylated hemoglobin (Hb A1C)    metFORMIN (GLUCOPHAGE) 500 MG tablet   3.  Severe episode of recurrent major depressive disorder, without psychotic features ([facility] [address]  buPROPion (WELLBUTRIN XL) 150 MG extended release tablet    buPROPion (WELLBUTRIN XL) 300 MG extended release tablet    risperiDONE (RISPERDAL) 0.5 MG tablet   4. Anxiety  buPROPion (WELLBUTRIN XL) 150 MG extended release tablet    buPROPion (WELLBUTRIN XL) 300 MG extended release tablet    risperiDONE (RISPERDAL) 0.5 MG tablet   5. Gastroesophageal reflux disease without esophagitis  ranitidine (ZANTAC) 150 MG tablet   6. Hyperlipidemia with target LDL less than 100  atorvastatin (LIPITOR) 10 MG tablet    Lipid Panel   7.  Morbid obesity with BMI of 45.0-49.9, adult ([facility])  Diet exercise and weight loss discussed with the patient            Plan:        Orders Placed This Encounter   Procedures    Lipid Panel     Standing Status:   Future     Standing Expiration Date:   5/1/2020     Order Specific Question:   Is Patient Fasting?/# of Hours     Answer:   12    Comprehensive Metabolic Panel     Standing Status:   Future     Standing Expiration Date:   5/1/2020    POCT glycosylated hemoglobin (Hb A1C)     Orders Placed This Encounter   Medications    buPROPion (WELLBUTRIN XL) 150 MG extended release tablet     Sig: TAKE ONE TABLET BY MOUTH EVERY MORNING TAKE ALONG WITH 300 MG     Dispense:  90 tablet     Refill:  0    buPROPion (WELLBUTRIN XL) 300 MG extended release tablet     Sig: TAKE ONE TABLET BY MOUTH EVERY MORNING TAKE ALONG WITH 150MG     Dispense:  90 tablet     Refill:  0    risperiDONE (RISPERDAL) 0.5 MG tablet     Sig: TAKE ONE TABLET BY MOUTH TWICE A DAY     Dispense:  60 tablet     Refill:  0    atenolol (TENORMIN) 50 MG tablet     Sig: Take 1 tablet by mouth daily     Dispense:  90 tablet     Refill:  0    metFORMIN (GLUCOPHAGE) 500 MG tablet     Sig: Take 1 tablet by mouth daily (with breakfast)     Dispense:  90 tablet     Refill:  0    baclofen (LIORESAL) 10 MG tablet     Sig: Take 1 tablet by mouth 3 times daily as needed (MUSCLE SPASMS) Causes sedation, do not drive while taking this medication     Dispense:  30 tablet     Refill:  0    ranitidine (ZANTAC) 150 MG tablet     Sig: Take 1 tablet by mouth 2 times daily     Dispense:  60 tablet     Refill:  0    atorvastatin (LIPITOR) 10 MG tablet     Sig: Take 1 tablet by mouth daily     Dispense:  90 tablet     Refill:  0    lisinopril (PRINIVIL;ZESTRIL) 40 MG tablet     Sig: TAKE ONE TABLET BY MOUTH DAILY     Dispense:  90 tablet     Refill:  0     Return in about 3 months (around 8/2/2019) for HTN.     Continue current medications reviewed from the chart          Radhames Pereyra MA

## 2019-05-03 ENCOUNTER — HOSPITAL ENCOUNTER (OUTPATIENT)
Age: 39
Discharge: HOME | End: 2019-05-03
Payer: COMMERCIAL

## 2019-05-03 DIAGNOSIS — E78.5 HYPERLIPIDEMIA WITH TARGET LDL LESS THAN 100: ICD-10-CM

## 2019-05-03 DIAGNOSIS — I10 ESSENTIAL HYPERTENSION: ICD-10-CM

## 2019-05-03 LAB
ALBUMIN SERPL-MCNC: 4.5 G/DL (ref 3.5–5.2)
ALBUMIN/GLOBULIN RATIO: 1.3 (ref 1–2.5)
ALP BLD-CCNC: 70 U/L (ref 40–129)
ALT SERPL-CCNC: 20 U/L (ref 5–41)
ANION GAP SERPL CALCULATED.3IONS-SCNC: 11 MMOL/L (ref 9–17)
AST SERPL-CCNC: 18 U/L
BILIRUB SERPL-MCNC: 0.79 MG/DL (ref 0.3–1.2)
BUN BLDV-MCNC: 16 MG/DL (ref 6–20)
BUN/CREAT BLD: ABNORMAL (ref 9–20)
CALCIUM SERPL-MCNC: 9.7 MG/DL (ref 8.6–10.4)
CHLORIDE BLD-SCNC: 96 MMOL/L (ref 98–107)
CHOLESTEROL/HDL RATIO: 2.7
CHOLESTEROL: 118 MG/DL
CO2: 27 MMOL/L (ref 20–31)
CREAT SERPL-MCNC: 1.04 MG/DL (ref 0.7–1.2)
GFR AFRICAN AMERICAN: >60 ML/MIN
GFR NON-AFRICAN AMERICAN: >60 ML/MIN
GFR SERPL CREATININE-BSD FRML MDRD: ABNORMAL ML/MIN/{1.73_M2}
GFR SERPL CREATININE-BSD FRML MDRD: ABNORMAL ML/MIN/{1.73_M2}
GLUCOSE BLD-MCNC: 147 MG/DL (ref 70–99)
HDLC SERPL-MCNC: 43 MG/DL
LDL CHOLESTEROL: 55 MG/DL (ref 0–130)
POTASSIUM SERPL-SCNC: 4.8 MMOL/L (ref 3.7–5.3)
SODIUM BLD-SCNC: 134 MMOL/L (ref 135–144)
TOTAL PROTEIN: 8.1 G/DL (ref 6.4–8.3)
TRIGL SERPL-MCNC: 98 MG/DL
VLDLC SERPL CALC-MCNC: NORMAL MG/DL (ref 1–30)

## 2019-05-03 PROCEDURE — 36415 COLL VENOUS BLD VENIPUNCTURE: CPT

## 2019-05-03 PROCEDURE — 80061 LIPID PANEL: CPT

## 2019-05-03 PROCEDURE — 80053 COMPREHEN METABOLIC PANEL: CPT

## 2019-06-04 DIAGNOSIS — K21.9 GASTROESOPHAGEAL REFLUX DISEASE WITHOUT ESOPHAGITIS: ICD-10-CM

## 2019-06-04 DIAGNOSIS — E78.5 HYPERLIPIDEMIA WITH TARGET LDL LESS THAN 100: ICD-10-CM

## 2019-06-04 DIAGNOSIS — F41.9 ANXIETY: ICD-10-CM

## 2019-06-04 DIAGNOSIS — M25.552 PAIN OF LEFT HIP JOINT: ICD-10-CM

## 2019-06-04 DIAGNOSIS — G89.29 CHRONIC LEFT-SIDED LOW BACK PAIN WITH LEFT-SIDED SCIATICA: ICD-10-CM

## 2019-06-04 DIAGNOSIS — M79.18 MUSCULOSKELETAL PAIN: ICD-10-CM

## 2019-06-04 DIAGNOSIS — M54.42 CHRONIC LEFT-SIDED LOW BACK PAIN WITH LEFT-SIDED SCIATICA: ICD-10-CM

## 2019-06-04 DIAGNOSIS — R73.03 PREDIABETES: ICD-10-CM

## 2019-06-04 DIAGNOSIS — I10 ESSENTIAL HYPERTENSION: ICD-10-CM

## 2019-06-04 DIAGNOSIS — F33.2 SEVERE EPISODE OF RECURRENT MAJOR DEPRESSIVE DISORDER, WITHOUT PSYCHOTIC FEATURES (HCC): ICD-10-CM

## 2019-06-04 DIAGNOSIS — R20.0 NUMBNESS: ICD-10-CM

## 2019-06-04 RX ORDER — LIDOCAINE 40 MG/G
CREAM TOPICAL
Qty: 120 G | Refills: 3 | Status: SHIPPED | OUTPATIENT
Start: 2019-06-04 | End: 2020-02-10

## 2019-06-04 RX ORDER — BUPROPION HYDROCHLORIDE 300 MG/1
TABLET ORAL
Qty: 90 TABLET | Refills: 0 | Status: SHIPPED | OUTPATIENT
Start: 2019-06-04 | End: 2019-11-25 | Stop reason: SDUPTHER

## 2019-06-04 RX ORDER — RANITIDINE 150 MG/1
150 TABLET ORAL 2 TIMES DAILY
Qty: 180 TABLET | Refills: 0 | Status: SHIPPED | OUTPATIENT
Start: 2019-06-04 | End: 2020-02-10

## 2019-06-04 RX ORDER — RISPERIDONE 0.5 MG/1
TABLET, FILM COATED ORAL
Qty: 60 TABLET | Refills: 3 | Status: SHIPPED | OUTPATIENT
Start: 2019-06-04 | End: 2020-12-01 | Stop reason: SDUPTHER

## 2019-06-04 RX ORDER — LISINOPRIL 40 MG/1
TABLET ORAL
Qty: 90 TABLET | Refills: 0 | Status: SHIPPED | OUTPATIENT
Start: 2019-06-04 | End: 2019-11-25 | Stop reason: SDUPTHER

## 2019-06-04 RX ORDER — ATORVASTATIN CALCIUM 10 MG/1
10 TABLET, FILM COATED ORAL DAILY
Qty: 90 TABLET | Refills: 0 | Status: SHIPPED | OUTPATIENT
Start: 2019-06-04 | End: 2019-11-23 | Stop reason: SDUPTHER

## 2019-06-04 RX ORDER — BUPROPION HYDROCHLORIDE 150 MG/1
TABLET ORAL
Qty: 90 TABLET | Refills: 0 | Status: SHIPPED | OUTPATIENT
Start: 2019-06-04 | End: 2019-08-27 | Stop reason: SDUPTHER

## 2019-06-04 RX ORDER — ATENOLOL 50 MG/1
50 TABLET ORAL DAILY
Qty: 90 TABLET | Refills: 0 | Status: SHIPPED | OUTPATIENT
Start: 2019-06-04 | End: 2019-11-23 | Stop reason: SDUPTHER

## 2019-06-04 RX ORDER — BACLOFEN 10 MG/1
10 TABLET ORAL 3 TIMES DAILY PRN
Qty: 90 TABLET | Refills: 0 | Status: SHIPPED | OUTPATIENT
Start: 2019-06-04 | End: 2020-02-10

## 2019-06-04 RX ORDER — HYDROCHLOROTHIAZIDE 25 MG/1
25 TABLET ORAL DAILY
Qty: 90 TABLET | Refills: 0 | Status: SHIPPED | OUTPATIENT
Start: 2019-06-04 | End: 2019-08-27 | Stop reason: SDUPTHER

## 2019-06-04 NOTE — TELEPHONE ENCOUNTER
Pt states he starts a new job tomorrow and doesn't know his shift yet so he will call back to schedule with Evelyn Shipley

## 2019-06-04 NOTE — TELEPHONE ENCOUNTER
Dr. Nathan Live only did 1 month refill so pt is almost out. Please Approve or Refuse.   Send to Pharmacy per Pt's Request:      Next Visit Date:  8/8/2019   Last Visit Date: 5/2/2019    Hemoglobin A1C (%)   Date Value   05/02/2019 5.8   10/18/2018 5.9   03/29/2018 5.9             ( goal A1C is < 7)   BP Readings from Last 3 Encounters:   05/02/19 134/82   11/14/18 125/82   10/18/18 138/80          (goal 120/80)  BUN   Date Value Ref Range Status   05/03/2019 16 6 - 20 mg/dL Final     CREATININE   Date Value Ref Range Status   05/03/2019 1.04 0.70 - 1.20 mg/dL Final     Potassium   Date Value Ref Range Status   05/03/2019 4.8 3.7 - 5.3 mmol/L Final

## 2019-06-12 ENCOUNTER — TELEPHONE (OUTPATIENT)
Dept: FAMILY MEDICINE CLINIC | Age: 39
End: 2019-06-12

## 2019-06-26 ENCOUNTER — TELEPHONE (OUTPATIENT)
Dept: FAMILY MEDICINE CLINIC | Age: 39
End: 2019-06-26

## 2019-08-26 ENCOUNTER — OFFICE VISIT (OUTPATIENT)
Dept: FAMILY MEDICINE CLINIC | Age: 39
End: 2019-08-26
Payer: COMMERCIAL

## 2019-08-26 VITALS
BODY MASS INDEX: 36.45 KG/M2 | DIASTOLIC BLOOD PRESSURE: 80 MMHG | HEIGHT: 78 IN | WEIGHT: 315 LBS | OXYGEN SATURATION: 97 % | SYSTOLIC BLOOD PRESSURE: 120 MMHG | HEART RATE: 98 BPM

## 2019-08-26 DIAGNOSIS — K52.9 GASTROENTERITIS: Primary | ICD-10-CM

## 2019-08-26 DIAGNOSIS — I10 ESSENTIAL HYPERTENSION: ICD-10-CM

## 2019-08-26 PROCEDURE — G8427 DOCREV CUR MEDS BY ELIG CLIN: HCPCS | Performed by: FAMILY MEDICINE

## 2019-08-26 PROCEDURE — 1036F TOBACCO NON-USER: CPT | Performed by: FAMILY MEDICINE

## 2019-08-26 PROCEDURE — 99213 OFFICE O/P EST LOW 20 MIN: CPT | Performed by: FAMILY MEDICINE

## 2019-08-26 PROCEDURE — G8417 CALC BMI ABV UP PARAM F/U: HCPCS | Performed by: FAMILY MEDICINE

## 2019-08-26 ASSESSMENT — ENCOUNTER SYMPTOMS
SORE THROAT: 0
NAUSEA: 0
COUGH: 0
SHORTNESS OF BREATH: 0
ABDOMINAL PAIN: 0
VOMITING: 1
DIARRHEA: 1

## 2019-11-23 DIAGNOSIS — I10 ESSENTIAL HYPERTENSION: ICD-10-CM

## 2019-11-23 DIAGNOSIS — F33.2 SEVERE EPISODE OF RECURRENT MAJOR DEPRESSIVE DISORDER, WITHOUT PSYCHOTIC FEATURES (HCC): ICD-10-CM

## 2019-11-23 DIAGNOSIS — F41.9 ANXIETY: ICD-10-CM

## 2019-11-23 DIAGNOSIS — E78.5 HYPERLIPIDEMIA WITH TARGET LDL LESS THAN 100: ICD-10-CM

## 2019-11-23 DIAGNOSIS — R73.03 PREDIABETES: ICD-10-CM

## 2019-11-25 RX ORDER — HYDROCHLOROTHIAZIDE 25 MG/1
TABLET ORAL
Qty: 90 TABLET | Refills: 3 | Status: SHIPPED | OUTPATIENT
Start: 2019-11-25 | End: 2020-03-09 | Stop reason: SDUPTHER

## 2019-11-25 RX ORDER — LISINOPRIL 40 MG/1
TABLET ORAL
Qty: 90 TABLET | Refills: 3 | Status: SHIPPED | OUTPATIENT
Start: 2019-11-25 | End: 2020-12-01 | Stop reason: SDUPTHER

## 2019-11-25 RX ORDER — BUPROPION HYDROCHLORIDE 300 MG/1
TABLET ORAL
Qty: 90 TABLET | Refills: 3 | Status: SHIPPED
Start: 2019-11-25 | End: 2020-12-01 | Stop reason: ALTCHOICE

## 2019-11-25 RX ORDER — BUPROPION HYDROCHLORIDE 150 MG/1
TABLET ORAL
Qty: 90 TABLET | Refills: 3 | Status: SHIPPED
Start: 2019-11-25 | End: 2020-12-01 | Stop reason: ALTCHOICE

## 2019-11-25 RX ORDER — ATENOLOL 50 MG/1
TABLET ORAL
Qty: 90 TABLET | Refills: 3 | Status: SHIPPED | OUTPATIENT
Start: 2019-11-25 | End: 2020-12-01 | Stop reason: SDUPTHER

## 2019-11-25 RX ORDER — ATORVASTATIN CALCIUM 10 MG/1
TABLET, FILM COATED ORAL
Qty: 90 TABLET | Refills: 3 | Status: SHIPPED | OUTPATIENT
Start: 2019-11-25 | End: 2020-12-01 | Stop reason: SDUPTHER

## 2020-02-10 ENCOUNTER — HOSPITAL ENCOUNTER (EMERGENCY)
Age: 40
Discharge: HOME | End: 2020-02-10
Payer: COMMERCIAL

## 2020-02-10 VITALS
OXYGEN SATURATION: 98 % | HEART RATE: 93 BPM | SYSTOLIC BLOOD PRESSURE: 151 MMHG | BODY MASS INDEX: 57.3 KG/M2 | WEIGHT: 315 LBS | RESPIRATION RATE: 16 BRPM | TEMPERATURE: 98.3 F | DIASTOLIC BLOOD PRESSURE: 92 MMHG

## 2020-02-10 LAB
-: ABNORMAL
ABSOLUTE EOS #: 0.12 K/UL (ref 0–0.44)
ABSOLUTE IMMATURE GRANULOCYTE: 0.08 K/UL (ref 0–0.3)
ABSOLUTE LYMPH #: 1.82 K/UL (ref 1.1–3.7)
ABSOLUTE MONO #: 1.36 K/UL (ref 0.1–1.2)
AMORPHOUS: ABNORMAL
ANION GAP SERPL CALCULATED.3IONS-SCNC: 13 MMOL/L (ref 9–17)
BACTERIA: ABNORMAL
BASOPHILS # BLD: 1 % (ref 0–2)
BASOPHILS ABSOLUTE: 0.07 K/UL (ref 0–0.2)
BETA-HYDROXYBUTYRATE: 0.14 MMOL/L (ref 0.02–0.27)
BILIRUBIN URINE: NEGATIVE
BUN BLDV-MCNC: 12 MG/DL (ref 6–20)
BUN/CREAT BLD: 14 (ref 9–20)
CALCIUM SERPL-MCNC: 9.5 MG/DL (ref 8.6–10.4)
CASTS UA: ABNORMAL /LPF
CHLORIDE BLD-SCNC: 90 MMOL/L (ref 98–107)
CO2: 25 MMOL/L (ref 20–31)
COLOR: YELLOW
COMMENT UA: ABNORMAL
CREAT SERPL-MCNC: 0.86 MG/DL (ref 0.7–1.2)
CRYSTALS, UA: ABNORMAL /HPF
DIFFERENTIAL TYPE: ABNORMAL
EOSINOPHILS RELATIVE PERCENT: 1 % (ref 1–4)
EPITHELIAL CELLS UA: ABNORMAL /HPF (ref 0–5)
GFR AFRICAN AMERICAN: >60 ML/MIN
GFR NON-AFRICAN AMERICAN: >60 ML/MIN
GFR SERPL CREATININE-BSD FRML MDRD: ABNORMAL ML/MIN/{1.73_M2}
GFR SERPL CREATININE-BSD FRML MDRD: ABNORMAL ML/MIN/{1.73_M2}
GLUCOSE BLD-MCNC: 428 MG/DL (ref 74–100)
GLUCOSE BLD-MCNC: 491 MG/DL (ref 70–99)
GLUCOSE BLD-MCNC: 560 MG/DL (ref 74–100)
GLUCOSE URINE: ABNORMAL
HCT VFR BLD CALC: 47.2 % (ref 40.7–50.3)
HEMOGLOBIN: 15.3 G/DL (ref 13–17)
IMMATURE GRANULOCYTES: 1 %
KETONES, URINE: NEGATIVE
LEUKOCYTE ESTERASE, URINE: NEGATIVE
LYMPHOCYTES # BLD: 15 % (ref 24–43)
MCH RBC QN AUTO: 28 PG (ref 25.2–33.5)
MCHC RBC AUTO-ENTMCNC: 32.4 G/DL (ref 28.4–34.8)
MCV RBC AUTO: 86.3 FL (ref 82.6–102.9)
MONOCYTES # BLD: 11 % (ref 3–12)
MUCUS: ABNORMAL
NITRITE, URINE: NEGATIVE
NRBC AUTOMATED: 0 PER 100 WBC
OTHER OBSERVATIONS UA: ABNORMAL
PDW BLD-RTO: 13.5 % (ref 11.8–14.4)
PH UA: 6 (ref 5–9)
PLATELET # BLD: 214 K/UL (ref 138–453)
PLATELET ESTIMATE: ABNORMAL
PMV BLD AUTO: 11.5 FL (ref 8.1–13.5)
POTASSIUM SERPL-SCNC: 4.4 MMOL/L (ref 3.7–5.3)
PROTEIN UA: NEGATIVE
RBC # BLD: 5.47 M/UL (ref 4.21–5.77)
RBC # BLD: ABNORMAL 10*6/UL
RBC UA: ABNORMAL /HPF (ref 0–2)
RENAL EPITHELIAL, UA: ABNORMAL /HPF
SEG NEUTROPHILS: 71 % (ref 36–65)
SEGMENTED NEUTROPHILS ABSOLUTE COUNT: 9 K/UL (ref 1.5–8.1)
SODIUM BLD-SCNC: 128 MMOL/L (ref 135–144)
SPECIFIC GRAVITY UA: 1 (ref 1.01–1.02)
TRICHOMONAS: ABNORMAL
TURBIDITY: CLEAR
URINE HGB: NEGATIVE
UROBILINOGEN, URINE: NORMAL
WBC # BLD: 12.5 K/UL (ref 3.5–11.3)
WBC # BLD: ABNORMAL 10*3/UL
WBC UA: ABNORMAL /HPF (ref 0–5)
YEAST: ABNORMAL

## 2020-02-10 PROCEDURE — 2580000003 HC RX 258: Performed by: PHYSICIAN ASSISTANT

## 2020-02-10 PROCEDURE — 6370000000 HC RX 637 (ALT 250 FOR IP): Performed by: PHYSICIAN ASSISTANT

## 2020-02-10 PROCEDURE — 2500000003 HC RX 250 WO HCPCS: Performed by: PHYSICIAN ASSISTANT

## 2020-02-10 PROCEDURE — 82947 ASSAY GLUCOSE BLOOD QUANT: CPT

## 2020-02-10 PROCEDURE — 82010 KETONE BODYS QUAN: CPT

## 2020-02-10 PROCEDURE — 96374 THER/PROPH/DIAG INJ IV PUSH: CPT

## 2020-02-10 PROCEDURE — 85025 COMPLETE CBC W/AUTO DIFF WBC: CPT

## 2020-02-10 PROCEDURE — 80048 BASIC METABOLIC PNL TOTAL CA: CPT

## 2020-02-10 PROCEDURE — 81001 URINALYSIS AUTO W/SCOPE: CPT

## 2020-02-10 PROCEDURE — 99282 EMERGENCY DEPT VISIT SF MDM: CPT

## 2020-02-10 RX ORDER — 0.9 % SODIUM CHLORIDE 0.9 %
1000 INTRAVENOUS SOLUTION INTRAVENOUS ONCE
Status: COMPLETED | OUTPATIENT
Start: 2020-02-10 | End: 2020-02-10

## 2020-02-10 RX ORDER — NYSTATIN 100000 [USP'U]/G
POWDER TOPICAL 3 TIMES DAILY
Qty: 60 G | Refills: 0 | Status: SHIPPED | OUTPATIENT
Start: 2020-02-10 | End: 2020-02-17

## 2020-02-10 RX ADMIN — SODIUM CHLORIDE 1000 ML: 9 INJECTION, SOLUTION INTRAVENOUS at 19:07

## 2020-02-10 RX ADMIN — MICONAZOLE NITRATE: 20 POWDER TOPICAL at 21:00

## 2020-02-10 RX ADMIN — INSULIN HUMAN 10 UNITS: 100 INJECTION, SOLUTION PARENTERAL at 20:09

## 2020-02-10 ASSESSMENT — PAIN DESCRIPTION - DESCRIPTORS: DESCRIPTORS: SORE

## 2020-02-10 ASSESSMENT — PAIN SCALES - GENERAL: PAINLEVEL_OUTOF10: 7

## 2020-02-10 ASSESSMENT — PAIN DESCRIPTION - PAIN TYPE: TYPE: ACUTE PAIN

## 2020-02-11 ENCOUNTER — TELEPHONE (OUTPATIENT)
Dept: FAMILY MEDICINE CLINIC | Age: 40
End: 2020-02-11

## 2020-02-11 NOTE — TELEPHONE ENCOUNTER
[facility]) ED Follow up Call    Reason for ED visit:  GENITAL CANDIDIASIS     2/11/2020     Reji Morales , this is MAIRA from Dr. Rosa Terry office, just calling to see how you are doing after your recent ED visit. Did you receive discharge instructions? Yes  Do you understand the discharge instructions? Yes  Did the ED give you any new prescriptions? Yes  Were you able to fill your prescriptions? Yes      Do you have one of our red, yellow and green  Zone sheets that help you to determine when you should go to the ED? Not Applicable      Do you need or want to make a follow up appt with your PCP? Yes    Do you have any further needs in the home, e.g. equipment?   No        FU appts/Provider:    Future Appointments   Date Time Provider Sejal Romero   2/19/2020 11:00 AM Darin Núñez MD fp sc [id number]

## 2020-02-11 NOTE — ED PROVIDER NOTES
[facility] ED  EMERGENCY DEPARTMENT ENCOUNTER      Pt Name: Shaan Reeder  MRN: [id number]  [place] 1980  Date of evaluation: 2/10/2020  Provider: Margarita Lindsey PA-C    CHIEF COMPLAINT       Chief Complaint   Patient presents with    Rash     pt states blisters and sores to his genitals. Ongoing for a week       HISTORY OF PRESENT ILLNESS    Shaan Reeder is a 44 y.o. male who presents to the emergency department from home mainline patient has a one-week history of growing areas of sores and redness on his genitalia. Denies any Zechariah the STDs. He states that he has had a lot of thirst and urinary frequency also. Denies fevers chills nausea vomiting denies 80-year-old male with dysuria or discharge. Triage notes and Nursing notes were reviewed by myself. Any discrepancies are addressed above. PAST MEDICAL HISTORY     Past Medical History:   Diagnosis Date    Anxiety     Chronic left-sided low back pain with left-sided sciatica 11/14/2018    Depression     Gastroesophageal reflux disease without esophagitis 10/24/2018    Headache     Hyperlipidemia     Hypertension     Intermittent explosive disorder in adult 10/24/2017    Morbid obesity with BMI of 45.0-49.9, adult ([facility] [address] 10/10/2017       SURGICAL HISTORY       Past Surgical History:   Procedure Laterality Date    TONSILLECTOMY         CURRENT MEDICATIONS       Discharge Medication List as of 2/10/2020  9:21 PM      CONTINUE these medications which have NOT CHANGED    Details   famotidine (PEPCID) 20 MG tablet Take 1 tablet by mouth 2 times daily **Zantac is recalled**, Disp-180 tablet, R-3Normal      lisinopril (PRINIVIL;ZESTRIL) 40 MG tablet TAKE ONE TABLET BY MOUTH DAILY, Disp-90 tablet, R-3Normal      !!  buPROPion (WELLBUTRIN XL) 300 MG extended release tablet TAKE ONE TABLET BY MOUTH EVERY MORNING ALONG WITH 150MG TABLET, Disp-90 tablet, R-3Normal      atenolol (TENORMIN) 50 MG tablet TAKE ONE TABLET BY MOUTH DAILY, edema. No calf tenderness noted. Distal pulses and sensation intact. Good capillary refill noted. No acute neurologic deficit noted. Good gait and balance. Clear speech. Good affect. Pleasant patient. DIAGNOSTIC RESULTS     LABS:  Labs Reviewed   GLUCOSE, WHOLE BLOOD - Abnormal; Notable for the following components:       Result Value    POC Glucose 560 (*)     All other components within normal limits   CBC WITH AUTO DIFFERENTIAL - Abnormal; Notable for the following components:    WBC 12.5 (*)     Seg Neutrophils 71 (*)     Lymphocytes 15 (*)     Immature Granulocytes 1 (*)     Segs Absolute 9.00 (*)     Absolute Mono # 1.36 (*)     All other components within normal limits   BASIC METABOLIC PANEL W/ REFLEX TO MG FOR LOW K - Abnormal; Notable for the following components:    Glucose 491 (*)     Sodium 128 (*)     Chloride 90 (*)     All other components within normal limits   URINALYSIS - Abnormal; Notable for the following components:    Glucose, Ur 4+ (*)     Specific Gravity, UA 1.005 (*)     All other components within normal limits   MICROSCOPIC URINALYSIS - Abnormal; Notable for the following components:    Bacteria, UA TRACE (*)     Yeast, UA PRESENCE NOTED (*)     All other components within normal limits   GLUCOSE, WHOLE BLOOD - Abnormal; Notable for the following components:    POC Glucose 428 (*)     All other components within normal limits   BETA-HYDROXYBUTYRATE   POCT GLUCOSE   POCT GLUCOSE   POCT GLUCOSE       All other labs were within normal range or not returned as of this dictation.     EMERGENCY DEPARTMENT COURSE andMedical Decision Making:     Vitals:    Vitals:    02/10/20 1650   BP: (!) 151/92   Pulse: 93   Resp: 16   Temp: 98.3 °F (36.8 °C)   TempSrc: Tympanic   SpO2: 98%   Weight: (!) 548 lb (248.6 kg)       MDM/      Given the candidal infection of the patient's groin I inquired about his diabetic status he for states that he is got borderline diabetes but that it is noted that he is on metformin I explained to him that he is a diabetic if he has to take medicine for blood sugar control he does admit to being noncompliant with his diet. He states that he is concerned that he has noted that only diabetics who seemed to lose limbs or toes are those that are taking diabetic medicines and therefore he is concerned about this. I explained that only people with severe blood sugar control issues, diabetes, are required to take the medicines and those people due to the disease are actually at risk for losing digits or limbs it is not directly related to the medication but to the disease process. Patient was treated with IV fluids and insulin this did improve his blood sugar somewhat. The patient was also noted as having family members bring him [employer] and eating a large amount of food while he was in the emergency department I do explained to him that if he is eating this way his blood sugar will be difficult to impossible control. The patient shows no sign of DKA with a normal anion gap and a normal beta hydroxybutyrate. His blood sugar is improving I recommend continuing with oral hypoglycemics at home calling his family practitioner tomorrow and arranging for close follow-up obtaining glucometer and test strips at that time as well as possible medication adjustment should his symptoms not improve. He is counseled on diet control. Strict return precautions and follow up instructions were discussed with the patient with which the patient agrees    ED Medications administered this visit:    Medications   0.9 % sodium chloride bolus (0 mLs Intravenous Stopped 2/10/20 2010)   insulin regular (HUMULIN R;NOVOLIN R) injection 10 Units (10 Units Intravenous Given 2/10/20 2009)   miconazole (MICOTIN) 2 % powder ( Topical Given 2/10/20 2100)       CONSULTS: (None if blank)  None    Procedures: (None if blank)       CLINICAL       1. Genital candidiasis in male    2.  Diabetes mellitus high risk          DISPOSITION/PLAN   DISPOSITION Decision To Discharge 02/10/2020 09:23:57 PM      PATIENT REFERRED TO:  Hugo Weems MD  [address] [facility]  [address]  [address] [place] [address] [place] [address]  [phone]    In 2 days  Check blood glucose, talk to your doctor about obtaining a glucometer and closer follow-up with your diabetes      DISCHARGE MEDICATIONS:  Discharge Medication List as of 2/10/2020  9:21 PM      START taking these medications    Details   nystatin (MYCOSTATIN) 294814 UNIT/GM powder Apply topically 3 times daily for 7 days Apply topically 4 times daily. , Topical, 3 TIMES DAILY Starting Mon 2/10/2020, Until Mon 2/17/2020, For 7 days, Disp-60 g, R-0, Print                    (Please note that portions of this note were completed with a voice recognition program.  Efforts were made to edit the dictations but occasionallywords are mis-transcribed.)      Melvin Daniel II, PA-C (electronically signed)           Melvin Daniel II, PA-C  02/10/20 0203

## 2020-03-08 NOTE — PROGRESS NOTES
[facility] PHYSICIANS  [facility] [address] [facility] [address].  [address] [address]  Dept: [phone]     3/9/2020   [place] (:  1980) is a 44 y.o. male is here today for follow up on genital candidiasis. Patient also wants to discuss   Chief Complaint   Patient presents with   [facility] ED Follow-up     yeast infection, high glucose, DM    Obesity     HPI   DIABETES MELLITUS  Patient has a history of diabetes mellitus. medication compliance:  will start, diabetic diet compliance:  will start, home glucose monitoring: are not performed. Patient's recent   Lab Results   Component Value Date    LABA1C 11.3 2020   . This patient is on Metformin for prediabetes. Patient is responding poorly with this therapy. Patient reports home glucose monitoring as not being done but will start readings. Patient denieshypoglycemia episodes such as{symptoms. Patient admits to neither polyuria nor polydipsia. The patient has seen an ophthalmologist with in one last year. A few months ago  The patient is  on ACE inhibitors. The patient has not a foot examination. The patient has not seen an podiatrist with in last year. I increase his dose of metformin 2000 mg twice daily. I will start him on some Lantus at 10 units for breakfast.  Patient is instructed to follow-up in 4 wks medication adjustment. Patient declined to be sent to diabetic education treatment. HYPERTENSION/PERIPHERAL EDEMA  Juan Francisco Carter 44 y.o. male  has a history of hypertension. Patient is currently on Metformin. Patient's most recent BP   BP Readings from Last 1 Encounters:   20 (!) 138/101      Patient denies any adverse reaction to this therapy. Patient does and does not monitor BP at home. Blood pressure is well controlled. Cardiovascular risk factors: diabetes mellitus, dyslipidemia, male gender, obesity (BMI >= 30 kg/m2) and sedentary lifestyle. He is not exercising and is adherent to low salt diet.   Use and myalgias. Skin: Positive for rash. Negative for color change. Both legs/ankles   Neurological: Negative for weakness, light-headedness and headaches. Psychiatric/Behavioral: Positive for dysphoric mood. Negative for agitation, behavioral problems, decreased concentration and sleep disturbance. The patient is nervous/anxious. Prior to Visit Medications    Medication Sig Taking? Authorizing Provider   Blood Pressure Monitor KIT Use as directed. Yes STEPHON Lantigua CNP   fluconazole (DIFLUCAN) 150 MG tablet 1 tablet now,  Repeat in three days Yes STEPHON Lantigua CNP   insulin glargine (LANTUS SOLOSTAR) 100 UNIT/ML injection pen Start 10 units. Yes STEPHON Lantigua CNP   metFORMIN (GLUCOPHAGE) 1000 MG tablet Take 1 tablet by mouth 2 times daily (with meals) Yes STEPHON Lantigua CNP   blood glucose monitor strips Test 2-3 times a day & as needed for symptoms of irregular blood glucose.  Yes STEPHON Lantigua CNP   Alcohol Swabs (ALCOHOL PREP) PADS Use as directed Yes STEPHON Lantigua CNP   Lancets MISC 1 each by Does not apply route 2 times daily Yes STEPHON Lantigua CNP   glucose monitoring kit (FREESTYLE) monitoring kit Use as directed Yes STEPHON Lantigua CNP   hydroCHLOROthiazide (HYDRODIURIL) 50 MG tablet Take 1 tablet by mouth daily Yes STEPHON Lantigua CNP   potassium chloride (KLOR-CON M) 10 MEQ extended release tablet Take 1 tablet by mouth daily Yes STEPHON Lantigua CNP   famotidine (PEPCID) 20 MG tablet Take 1 tablet by mouth 2 times daily **Zantac is recalled** Yes Joe Cesar MD   lisinopril (PRINIVIL;ZESTRIL) 40 MG tablet TAKE ONE TABLET BY MOUTH DAILY Yes Joe Cesar MD   buPROPion (WELLBUTRIN XL) 300 MG extended release tablet TAKE ONE TABLET BY MOUTH EVERY MORNING ALONG WITH 150MG TABLET Yes Joe Cesar MD   atenolol (TENORMIN) 50 MG tablet TAKE ONE TABLET BY MOUTH DAILY Yes Leeanna Shannon MD   atorvastatin (LIPITOR) 10 MG tablet TAKE ONE TABLET BY MOUTH DAILY Yes Leeanna Shannon MD   buPROPion (WELLBUTRIN XL) 150 MG extended release tablet TAKE ONE TABLET BY MOUTH EVERY MORNING. Take along with 300 mg, total of 450 mg daily Yes Leeanna Shannon MD   risperiDONE (RISPERDAL) 0.5 MG tablet TAKE ONE TABLET BY MOUTH TWICE A DAY Yes Leeanna Shannon MD      Social History     Tobacco Use    Smoking status: Former Smoker     Packs/day: 1.25     Years: 23.00     Pack years: 28.75     Types: Cigarettes     Last attempt to quit: 3/26/2017     Years since quittin.9    Smokeless tobacco: Never Used    Tobacco comment: Quit smoking cold turkey   Substance Use Topics    Alcohol use: No     Alcohol/week: 0.0 standard drinks      Vitals:    20 0950 20 1007   BP: (!) 150/110 (!) 138/101   Pulse: 101    SpO2: 97%  Comment: resting @ RA    Weight: (!) 550 lb (249.5 kg)    Height: 6' 10\" (2.083 m)      Estimated body mass index is 57.51 kg/m² as calculated from the following:    Height as of this encounter: 6' 10\" (2.083 m). Weight as of this encounter: 550 lb (249.5 kg). Physical Exam  Vitals signs and nursing note reviewed. Constitutional:       Appearance: He is well-developed. He is obese. HENT:      Head: Normocephalic. Right Ear: External ear normal.      Left Ear: External ear normal.      Nose: Nose normal.   Eyes:      General: No scleral icterus. Conjunctiva/sclera: Conjunctivae normal.      Pupils: Pupils are equal, round, and reactive to light. Neck:      Musculoskeletal: Normal range of motion and neck supple. Thyroid: No thyromegaly. Vascular: No JVD. Cardiovascular:      Rate and Rhythm: Normal rate and regular rhythm. Heart sounds: Normal heart sounds. No murmur. No friction rub. No gallop. Pulmonary:      Effort: Pulmonary effort is normal. No respiratory distress. Breath sounds: Normal breath sounds.  No

## 2020-03-09 ENCOUNTER — OFFICE VISIT (OUTPATIENT)
Dept: FAMILY MEDICINE CLINIC | Age: 40
End: 2020-03-09
Payer: COMMERCIAL

## 2020-03-09 ENCOUNTER — HOSPITAL ENCOUNTER (OUTPATIENT)
Age: 40
Discharge: HOME | End: 2020-03-09
Payer: COMMERCIAL

## 2020-03-09 VITALS
SYSTOLIC BLOOD PRESSURE: 138 MMHG | HEART RATE: 101 BPM | BODY MASS INDEX: 36.45 KG/M2 | WEIGHT: 315 LBS | OXYGEN SATURATION: 97 % | HEIGHT: 78 IN | DIASTOLIC BLOOD PRESSURE: 101 MMHG

## 2020-03-09 PROBLEM — E11.65 TYPE 2 DIABETES MELLITUS WITH HYPERGLYCEMIA, WITHOUT LONG-TERM CURRENT USE OF INSULIN (HCC): Status: ACTIVE | Noted: 2020-03-09

## 2020-03-09 PROBLEM — R60.0 PERIPHERAL EDEMA: Status: ACTIVE | Noted: 2020-03-09

## 2020-03-09 PROBLEM — R60.9 PERIPHERAL EDEMA: Status: ACTIVE | Noted: 2020-03-09

## 2020-03-09 PROBLEM — R73.03 PREDIABETES: Status: RESOLVED | Noted: 2017-06-14 | Resolved: 2020-03-09

## 2020-03-09 PROBLEM — B37.49 GENITAL CANDIDIASIS IN MALE: Status: ACTIVE | Noted: 2020-03-09

## 2020-03-09 LAB
CREATININE URINE: 216.6 MG/DL (ref 39–259)
HBA1C MFR BLD: 11.3 %
MICROALBUMIN/CREAT 24H UR: 14 MG/L
MICROALBUMIN/CREAT UR-RTO: 6 MCG/MG CREAT

## 2020-03-09 PROCEDURE — 83036 HEMOGLOBIN GLYCOSYLATED A1C: CPT | Performed by: FAMILY MEDICINE

## 2020-03-09 PROCEDURE — 2022F DILAT RTA XM EVC RTNOPTHY: CPT | Performed by: FAMILY MEDICINE

## 2020-03-09 PROCEDURE — G8484 FLU IMMUNIZE NO ADMIN: HCPCS | Performed by: FAMILY MEDICINE

## 2020-03-09 PROCEDURE — 82570 ASSAY OF URINE CREATININE: CPT

## 2020-03-09 PROCEDURE — G8417 CALC BMI ABV UP PARAM F/U: HCPCS | Performed by: FAMILY MEDICINE

## 2020-03-09 PROCEDURE — G8427 DOCREV CUR MEDS BY ELIG CLIN: HCPCS | Performed by: FAMILY MEDICINE

## 2020-03-09 PROCEDURE — 86787 VARICELLA-ZOSTER ANTIBODY: CPT

## 2020-03-09 PROCEDURE — 99214 OFFICE O/P EST MOD 30 MIN: CPT | Performed by: FAMILY MEDICINE

## 2020-03-09 PROCEDURE — 82043 UR ALBUMIN QUANTITATIVE: CPT

## 2020-03-09 PROCEDURE — 1036F TOBACCO NON-USER: CPT | Performed by: FAMILY MEDICINE

## 2020-03-09 PROCEDURE — 36415 COLL VENOUS BLD VENIPUNCTURE: CPT

## 2020-03-09 PROCEDURE — 3046F HEMOGLOBIN A1C LEVEL >9.0%: CPT | Performed by: FAMILY MEDICINE

## 2020-03-09 RX ORDER — POTASSIUM CHLORIDE 750 MG/1
10 TABLET, EXTENDED RELEASE ORAL DAILY
Qty: 90 TABLET | Refills: 1 | Status: SHIPPED | OUTPATIENT
Start: 2020-03-09 | End: 2020-09-22

## 2020-03-09 RX ORDER — GLUCOSAMINE HCL/CHONDROITIN SU 500-400 MG
CAPSULE ORAL
Qty: 100 STRIP | Refills: 5 | Status: SHIPPED | OUTPATIENT
Start: 2020-03-09 | End: 2020-12-01 | Stop reason: SDUPTHER

## 2020-03-09 RX ORDER — HYDROCHLOROTHIAZIDE 50 MG/1
50 TABLET ORAL DAILY
Qty: 30 TABLET | Refills: 0 | Status: SHIPPED | OUTPATIENT
Start: 2020-03-09 | End: 2020-04-07

## 2020-03-09 RX ORDER — BLOOD PRESSURE TEST KIT
KIT MISCELLANEOUS
Qty: 1 KIT | Refills: 1 | Status: SHIPPED | OUTPATIENT
Start: 2020-03-09 | End: 2022-05-24 | Stop reason: ALTCHOICE

## 2020-03-09 RX ORDER — PEN NEEDLE, DIABETIC 31 GX5/16"
NEEDLE, DISPOSABLE MISCELLANEOUS
Qty: 100 EACH | Refills: 5 | Status: SHIPPED | OUTPATIENT
Start: 2020-03-09 | End: 2020-12-01 | Stop reason: SDUPTHER

## 2020-03-09 RX ORDER — INSULIN GLARGINE 100 [IU]/ML
INJECTION, SOLUTION SUBCUTANEOUS
Qty: 5 PEN | Refills: 3 | Status: SHIPPED | OUTPATIENT
Start: 2020-03-09 | End: 2020-12-01 | Stop reason: SDUPTHER

## 2020-03-09 RX ORDER — FLUCONAZOLE 150 MG/1
TABLET ORAL
Qty: 1 TABLET | Refills: 1 | Status: SHIPPED | OUTPATIENT
Start: 2020-03-09 | End: 2020-12-01 | Stop reason: ALTCHOICE

## 2020-03-09 RX ORDER — BLOOD-GLUCOSE METER
KIT MISCELLANEOUS
Qty: 1 KIT | Refills: 0 | Status: SHIPPED | OUTPATIENT
Start: 2020-03-09

## 2020-03-09 RX ORDER — LANCETS 30 GAUGE
1 EACH MISCELLANEOUS 2 TIMES DAILY
Qty: 300 EACH | Refills: 1 | Status: SHIPPED | OUTPATIENT
Start: 2020-03-09 | End: 2020-12-01 | Stop reason: SDUPTHER

## 2020-03-09 ASSESSMENT — ENCOUNTER SYMPTOMS
SORE THROAT: 0
COUGH: 0
SHORTNESS OF BREATH: 0
DIARRHEA: 0
APNEA: 0
ABDOMINAL PAIN: 0
VOMITING: 0
EYE PAIN: 0
TROUBLE SWALLOWING: 0
CHEST TIGHTNESS: 0
CONSTIPATION: 0
COLOR CHANGE: 0
NAUSEA: 0
RESPIRATORY NEGATIVE: 1

## 2020-03-09 NOTE — PATIENT INSTRUCTIONS
sign in to your [employer] account. Enter C553 in the [facility] box to learn more about \"Type 2 Diabetes: Care Instructions. \"     If you do not have an account, please click on the \"Sign Up Now\" link. Current as of: April 16, 2019  Content Version: 12.3  © 8181-7394 [employer]. Care instructions adapted under license by [facility] ([facility]). If you have questions about a medical condition or this instruction, always ask your healthcare professional. [place] [address] any warranty or liability for your use of this information. Patient Education        Learning About Diabetes Food Guidelines  Your Care Instructions    Meal planning is important to manage diabetes. It helps keep your blood sugar at a target level (which you set with your doctor). You don't have to eat special foods. You can eat what your family eats, including sweets once in a while. But you do have to pay attention to how often you eat and how much you eat of certain foods. You may want to work with a dietitian or a certified diabetes educator (CDE) to help you plan meals and snacks. A dietitian or CDE can also help you lose weight if that is one of your goals. What should you know about eating carbs? Managing the amount of carbohydrate (carbs) you eat is an important part of healthy meals when you have diabetes. Carbohydrate is found in many foods. · Learn which foods have carbs. And learn the amounts of carbs in different foods. ? Bread, cereal, pasta, and rice have about 15 grams of carbs in a serving. A serving is 1 slice of bread (1 ounce), ½ cup of cooked cereal, or 1/3 cup of cooked pasta or rice. ? Fruits have 15 grams of carbs in a serving. A serving is 1 small fresh fruit, such as an apple or orange; ½ of a banana; ½ cup of cooked or canned fruit; ½ cup of fruit juice; 1 cup of melon or raspberries; or 2 tablespoons of dried fruit.   ? Milk and no-sugar-added yogurt have 15 grams of carbs in

## 2020-03-09 NOTE — PROGRESS NOTES
Visit Information    Have you changed or started any medications since your last visit including any over-the-counter medicines, vitamins, or herbal medicines? no   Are you having any side effects from any of your medications? -  no  Have you stopped taking any of your medications? Is so, why? -  no    Have you seen any other physician or provider since your last visit? No  Have you had any other diagnostic tests since your last visit? No  Have you been seen in the emergency room and/or had an admission to a hospital since we last saw you? Yes - Records Obtained  Have you had your routine dental cleaning in the past 6 months? no    Have you activated your [employer] account? If not, what are your barriers?  Yes     Patient Care Team:  Hugo Weems MD as PCP - General (Family Medicine)  Hugo Weems MD [facility] PCP [facility]  TITO Kim as  (Licensed Clinical )    Medical History Review  Past Medical, Family, and Social History reviewed and does contribute to the patient presenting condition    Health Maintenance   Topic Date Due    Varicella vaccine (1 of 2 - 2-dose childhood series) 08/06/1981    Flu vaccine (1) 06/30/2020 (Originally 9/1/2019)    A1C test (Diabetic or Prediabetic)  05/02/2020    Lipid screen  05/03/2020    Potassium monitoring  02/10/2021    Creatinine monitoring  02/10/2021    DTaP/Tdap/Td vaccine (2 - Td) 12/28/2026    Shingles Vaccine (1 of 2) 08/06/2030    HIV screen  Completed    Hepatitis A vaccine  Aged Out    Hepatitis B vaccine  Aged Out    Hib vaccine  Aged Out    Meningococcal (ACWY) vaccine  Aged Out    Pneumococcal 0-64 years Vaccine  Aged [place]

## 2020-03-11 LAB — VZV IGG SER QL IA: 1.63

## 2020-03-12 ENCOUNTER — TELEPHONE (OUTPATIENT)
Dept: FAMILY MEDICINE CLINIC | Age: 40
End: 2020-03-12

## 2020-04-07 RX ORDER — HYDROCHLOROTHIAZIDE 50 MG/1
TABLET ORAL
Qty: 90 TABLET | Refills: 3 | Status: SHIPPED | OUTPATIENT
Start: 2020-04-07 | End: 2020-12-01 | Stop reason: SDUPTHER

## 2020-04-07 NOTE — TELEPHONE ENCOUNTER
Please Approve or Refuse.   Send to Pharmacy per Pt's Request:      Next Visit Date:  Visit date not found   Last Visit Date: 3/9/2020    Hemoglobin A1C (%)   Date Value   03/09/2020 11.3   05/02/2019 5.8   10/18/2018 5.9             ( goal A1C is < 7)   BP Readings from Last 3 Encounters:   03/09/20 (!) 138/101   02/10/20 (!) 151/92   08/26/19 120/80          (goal 120/80)  BUN   Date Value Ref Range Status   02/10/2020 12 6 - 20 mg/dL Final     CREATININE   Date Value Ref Range Status   02/10/2020 0.86 0.70 - 1.20 mg/dL Final     Potassium   Date Value Ref Range Status   02/10/2020 4.4 3.7 - 5.3 mmol/L Final

## 2020-04-27 ENCOUNTER — TELEPHONE (OUTPATIENT)
Dept: FAMILY MEDICINE CLINIC | Age: 40
End: 2020-04-27

## 2020-06-08 ENCOUNTER — TELEPHONE (OUTPATIENT)
Dept: FAMILY MEDICINE CLINIC | Age: 40
End: 2020-06-08

## 2020-06-08 NOTE — TELEPHONE ENCOUNTER
Patient needs appointment for diabetes with me or Malini    Lab Results   Component Value Date    LABA1C 11.3 03/09/2020    LABA1C 5.8 05/02/2019    LABA1C 5.9 10/18/2018

## 2020-08-07 NOTE — TELEPHONE ENCOUNTER
Please Approve or Refuse.   Send to Pharmacy per Pt's Request:      Next Visit Date:  Visit date not found   Last Visit Date: 3/9/2020    Hemoglobin A1C (%)   Date Value   03/09/2020 11.3   05/02/2019 5.8   10/18/2018 5.9             ( goal A1C is < 7)   BP Readings from Last 3 Encounters:   03/09/20 (!) 138/101   02/10/20 (!) 151/92   08/26/19 120/80          (goal 120/80)  BUN   Date Value Ref Range Status   02/10/2020 12 6 - 20 mg/dL Final     CREATININE   Date Value Ref Range Status   02/10/2020 0.86 0.70 - 1.20 mg/dL Final     Potassium   Date Value Ref Range Status   02/10/2020 4.4 3.7 - 5.3 mmol/L Final

## 2020-09-22 RX ORDER — POTASSIUM CHLORIDE 750 MG/1
TABLET, EXTENDED RELEASE ORAL
Qty: 90 TABLET | Refills: 0 | Status: SHIPPED | OUTPATIENT
Start: 2020-09-22 | End: 2020-12-01 | Stop reason: SDUPTHER

## 2020-09-27 ENCOUNTER — TELEPHONE (OUTPATIENT)
Dept: FAMILY MEDICINE CLINIC | Age: 40
End: 2020-09-27

## 2020-09-27 NOTE — TELEPHONE ENCOUNTER
Patient needs appointment for diabetes with me or 1 of my nurse practitioners, he is overdue for A1c    Lab Results   Component Value Date    LABA1C 11.3 03/09/2020    LABA1C 5.8 05/02/2019    LABA1C 5.9 10/18/2018       No future appointments.

## 2020-11-30 NOTE — PROGRESS NOTES
Visit Information    Have you changed or started any medications since your last visit including any over-the-counter medicines, vitamins, or herbal medicines? no   Are you having any side effects from any of your medications? -  no  Have you stopped taking any of your medications? Is so, why? -  no    Have you seen any other physician or provider since your last visit? No  Have you had any other diagnostic tests since your last visit? No  Have you been seen in the emergency room and/or had an admission to a hospital since we last saw you? No  Have you had your routine dental cleaning in the past 6 months? no    Have you activated your [employer] account? If not, what are your barriers?  Yes     Patient Care Team:  Lennox [place], MD as PCP - General (Family Medicine)  Lennox [place], MD as PCP [facility]  TITO Martinez as  (Licensed Clinical )    Medical History Review  Past Medical, Family, and Social History reviewed and does contribute to the patient presenting condition    Health Maintenance   Topic Date Due    Varicella vaccine (1 of 2 - 2-dose childhood series) 08/06/1981    Diabetic foot exam  08/06/1990    Diabetic retinal exam  08/06/1990    Hepatitis B vaccine (1 of 3 - Risk 3-dose series) 08/06/1999    Lipid screen  05/03/2020    A1C test (Diabetic or Prediabetic)  06/09/2020    Flu vaccine (1) 09/01/2020    Potassium monitoring  02/10/2021    Creatinine monitoring  02/10/2021    Diabetic microalbuminuria test  03/09/2021    DTaP/Tdap/Td vaccine (2 - Td) 12/28/2026    HIV screen  Completed    Hepatitis A vaccine  Aged Out    Hib vaccine  Aged Out    Meningococcal (ACWY) vaccine  Aged Out    Pneumococcal 0-64 years Vaccine  Aged Out

## 2020-12-01 ENCOUNTER — VIRTUAL VISIT (OUTPATIENT)
Dept: FAMILY MEDICINE CLINIC | Age: 40
End: 2020-12-01
Payer: COMMERCIAL

## 2020-12-01 PROCEDURE — 99214 OFFICE O/P EST MOD 30 MIN: CPT | Performed by: FAMILY MEDICINE

## 2020-12-01 RX ORDER — ATORVASTATIN CALCIUM 10 MG/1
10 TABLET, FILM COATED ORAL NIGHTLY
Qty: 90 TABLET | Refills: 3 | Status: SHIPPED | OUTPATIENT
Start: 2020-12-01 | End: 2021-12-02

## 2020-12-01 RX ORDER — RISPERIDONE 1 MG/1
1 TABLET, FILM COATED ORAL 2 TIMES DAILY
Qty: 60 TABLET | Refills: 5 | Status: SHIPPED | OUTPATIENT
Start: 2020-12-01 | End: 2021-03-18

## 2020-12-01 RX ORDER — GLUCOSAMINE HCL/CHONDROITIN SU 500-400 MG
CAPSULE ORAL
Qty: 100 STRIP | Refills: 5 | Status: SHIPPED | OUTPATIENT
Start: 2020-12-01

## 2020-12-01 RX ORDER — ASPIRIN 81 MG/1
81 TABLET ORAL DAILY
Qty: 90 TABLET | Refills: 3 | Status: SHIPPED | OUTPATIENT
Start: 2020-12-01 | End: 2022-06-02 | Stop reason: SDUPTHER

## 2020-12-01 RX ORDER — LANCETS 30 GAUGE
1 EACH MISCELLANEOUS 2 TIMES DAILY
Qty: 300 EACH | Refills: 1 | Status: SHIPPED | OUTPATIENT
Start: 2020-12-01

## 2020-12-01 RX ORDER — HYDROCHLOROTHIAZIDE 50 MG/1
50 TABLET ORAL DAILY
Qty: 90 TABLET | Refills: 3 | Status: SHIPPED | OUTPATIENT
Start: 2020-12-01 | End: 2021-06-17 | Stop reason: SDUPTHER

## 2020-12-01 RX ORDER — ATENOLOL 50 MG/1
50 TABLET ORAL DAILY
Qty: 90 TABLET | Refills: 3 | Status: SHIPPED | OUTPATIENT
Start: 2020-12-01 | End: 2021-02-02 | Stop reason: SDUPTHER

## 2020-12-01 RX ORDER — PEN NEEDLE, DIABETIC 31 GX5/16"
NEEDLE, DISPOSABLE MISCELLANEOUS
Qty: 100 EACH | Refills: 5 | Status: SHIPPED | OUTPATIENT
Start: 2020-12-01

## 2020-12-01 RX ORDER — POTASSIUM CHLORIDE 750 MG/1
10 TABLET, EXTENDED RELEASE ORAL DAILY
Qty: 90 TABLET | Refills: 3 | Status: SHIPPED | OUTPATIENT
Start: 2020-12-01 | End: 2022-01-12

## 2020-12-01 RX ORDER — BUPROPION HYDROCHLORIDE 150 MG/1
150 TABLET, EXTENDED RELEASE ORAL 2 TIMES DAILY
Qty: 60 TABLET | Refills: 5 | Status: SHIPPED
Start: 2020-12-01 | End: 2021-02-02

## 2020-12-01 RX ORDER — PEN NEEDLE, DIABETIC
NEEDLE, DISPOSABLE MISCELLANEOUS
COMMUNITY
Start: 2020-11-11 | End: 2020-12-01 | Stop reason: SDUPTHER

## 2020-12-01 RX ORDER — INSULIN GLARGINE 100 [IU]/ML
10 INJECTION, SOLUTION SUBCUTANEOUS
Qty: 5 PEN | Refills: 3 | Status: SHIPPED | OUTPATIENT
Start: 2020-12-01 | End: 2021-03-15

## 2020-12-01 RX ORDER — LISINOPRIL 40 MG/1
40 TABLET ORAL DAILY
Qty: 90 TABLET | Refills: 3 | Status: SHIPPED | OUTPATIENT
Start: 2020-12-01 | End: 2022-06-02 | Stop reason: SDUPTHER

## 2020-12-01 RX ORDER — PEN NEEDLE, DIABETIC
1 NEEDLE, DISPOSABLE MISCELLANEOUS DAILY
Qty: 100 EACH | Refills: 3 | Status: SHIPPED | OUTPATIENT
Start: 2020-12-01

## 2020-12-01 ASSESSMENT — COLUMBIA-SUICIDE SEVERITY RATING SCALE - C-SSRS
7. DID THIS OCCUR IN THE LAST THREE MONTHS: YES
4. HAVE YOU HAD THESE THOUGHTS AND HAD SOME INTENTION OF ACTING ON THEM?: NO
6. HAVE YOU EVER DONE ANYTHING, STARTED TO DO ANYTHING, OR PREPARED TO DO ANYTHING TO END YOUR LIFE?: YES
5. HAVE YOU STARTED TO WORK OUT OR WORKED OUT THE DETAILS OF HOW TO KILL YOURSELF? DO YOU INTEND TO CARRY OUT THIS PLAN?: NO
3. HAVE YOU BEEN THINKING ABOUT HOW YOU MIGHT KILL YOURSELF?: NO
1. WITHIN THE PAST MONTH, HAVE YOU WISHED YOU WERE DEAD OR WISHED YOU COULD GO TO SLEEP AND NOT WAKE UP?: YES
2. HAVE YOU ACTUALLY HAD ANY THOUGHTS OF KILLING YOURSELF?: YES

## 2020-12-01 ASSESSMENT — PATIENT HEALTH QUESTIONNAIRE - PHQ9
SUM OF ALL RESPONSES TO PHQ QUESTIONS 1-9: 22
6. FEELING BAD ABOUT YOURSELF - OR THAT YOU ARE A FAILURE OR HAVE LET YOURSELF OR YOUR FAMILY DOWN: 3
2. FEELING DOWN, DEPRESSED OR HOPELESS: 3
7. TROUBLE CONCENTRATING ON THINGS, SUCH AS READING THE NEWSPAPER OR WATCHING TELEVISION: 3
SUM OF ALL RESPONSES TO PHQ QUESTIONS 1-9: 23
5. POOR APPETITE OR OVEREATING: 3
9. THOUGHTS THAT YOU WOULD BE BETTER OFF DEAD, OR OF HURTING YOURSELF: 1
1. LITTLE INTEREST OR PLEASURE IN DOING THINGS: 1
SUM OF ALL RESPONSES TO PHQ QUESTIONS 1-9: 23
4. FEELING TIRED OR HAVING LITTLE ENERGY: 3
SUM OF ALL RESPONSES TO PHQ9 QUESTIONS 1 & 2: 4
8. MOVING OR SPEAKING SO SLOWLY THAT OTHER PEOPLE COULD HAVE NOTICED. OR THE OPPOSITE, BEING SO FIGETY OR RESTLESS THAT YOU HAVE BEEN MOVING AROUND A LOT MORE THAN USUAL: 3
3. TROUBLE FALLING OR STAYING ASLEEP: 3
10. IF YOU CHECKED OFF ANY PROBLEMS, HOW DIFFICULT HAVE THESE PROBLEMS MADE IT FOR YOU TO DO YOUR WORK, TAKE CARE OF THINGS AT HOME, OR GET ALONG WITH OTHER PEOPLE: 3

## 2020-12-01 NOTE — PATIENT INSTRUCTIONS

## 2020-12-01 NOTE — PROGRESS NOTES
Norma Medeiros is a 36 y.o. male evaluated via telephone on  12/1/20, including a do doxy. me or my chart visit    Consent:  He is aware that that he may receive a bill for this telephone service, depending on his insurance coverage, and has provided verbal consent to proceed: Yes      Documentation and HPI:  I communicated with the patient about: Hypertension; Diabetes; and Depression       Sultana Stark reports:      Has diabetes mellitus type 2, recently diagnosed  Home Blood Glucose  130-200s, has been checking home blood glucose 2-3 times a day    He still drinks pop, but less than before and only diet, he says  He never had diabetic education  He does take  baby aspirin  He reports compliance with Metformin 1000 mg twice a day and Lantus 10 units, tolerates them well, denies side effects    A1c very high  Lab Results   Component Value Date    LABA1C 11.3 03/09/2020    LABA1C 5.8 05/02/2019    LABA1C 5.9 10/18/2018       Weight is improved, he reports his weight now is 480 pounds, he has lost 70 pounds intentionally  Wt Readings from Last 3 Encounters:   03/09/20 (!) 550 lb (249.5 kg)   02/10/20 (!) 548 lb (248.6 kg)   08/26/19 (!) 520 lb (235.9 kg)     Hypertension:    he  is not exercising and is adherent to low salt diet. Blood pressure is well controlled at home. Cardiac symptoms fatigue. Patient denies chest pain, chest pressure/discomfort, claudication, dyspnea, exertional chest pressure/discomfort, irregular heart beat, lower extremity edema, near-syncope, orthopnea, palpitations, paroxysmal nocturnal dyspnea, syncope and tachypnea. Cardiovascular risk factors: diabetes mellitus, dyslipidemia, hypertension, male gender, obesity (BMI >= 30 kg/m2) and smoking/ tobacco exposure. Use of agents associated with hypertension: none. History of target organ damage: none.       Patient reports the blood pressure is 140/85 borderline high    BP high before too  BP Readings from Last 3 Encounters:   03/09/20 (!) 138/101   02/10/20 (!) 151/92   08/26/19 120/80        Hyperlipidemia:  No new myalgias or GI upset on atorvastatin (Lipitor). Medication compliance: compliant most of the time. Patient is  following a low fat, low cholesterol diet. LDL is normal  Lab Results   Component Value Date    LDLCHOLESTEROL 55 05/03/2019     Lab Results   Component Value Date    TRIG 98 05/03/2019    TRIG 139 08/06/2018    TRIG 180 (H) 05/31/2017         Depression is worsening  He says Wellbutrin comes in the stool, he can see the full pills, he says   Not seeing psychiatrist but he feels he needs someone  He says she has been \"Breaking down in crying\"   Has been \"Shouting at people\". Does not want to come out of the house sometimes. His wife says that he needs someone to take care of his mind    PHQ-2 Over the past 2 weeks, how often have you been bothered by any of the following problems? Little interest or pleasure in doing things: Several days  Feeling down, depressed, or hopeless: Nearly every day  PHQ-2 Score: 4  PHQ-9 Over the past 2 weeks, how often have you been bothered by any of the following problems? Trouble falling or staying asleep, or sleeping too much: Nearly every day  Feeling tired or having little energy: Nearly every day  Poor appetite or overeating: Nearly every day  Feeling bad about yourself - or that you are a failure or have let yourself or your family down: Nearly every day  Trouble concentrating on things, such as reading the newspaper or watching television: Nearly every day  Moving or speaking so slowly that other people could have noticed.  Or the opposite - being so fidgety or restless that you have been moving around a lot more than usual: Nearly every day  Thoughts that you would be better off dead, or of hurting yourself in some way: Several days  If you checked off any problems, how difficult have these problems made it for you to do your work, take care of things at home, or get along with other people?: Extremely dIfficult  PHQ-9 Total Score: 23        AMB C-SSRS Suicide Screening     1) Within the past month, have you wished you were dead or wished you could go to sleep and not wake up? YES   2) Have you actually had any thoughts of killing yourself? YES   3) Have you been thinking about how you might kill yourself? NO   4) Have you had these thoughts and had some intention of acting on them? NO   5) Have you started to work out or worked out the details of how to kill yourself? Do you intend to carry out this plan? NO   6) Have you ever done anything, started to do anything, or prepared to do anything to end your life? YES   Did this occur in the last three months? Yes     Severe depression not improving     PHQ Scores 2020 10/18/2018 3/29/2018 10/10/2017   PHQ2 Score 4 6 4 5   PHQ9 Score 23 27 21 22     Patient is due for hepatitis B antibodies check. his indication is diabetes mellitus. eFr Magdaleno does not remember if he was ever vaccinated against hepatitis B. Patient is due for varicella antibody check due to his  age group and CDC recommendation. he denies any rash, or recent illness. he denies any signs or symptoms of chickenpox. The patient's past medical, surgical, social, and family history as well as his current medications and allergies were reviewed as documented in today's encounter. Prior to Visit Medications    Medication Sig Taking? Authorizing Provider   Yeyo Kramer 31G X 6 MM MISC  Yes Historical Provider, MD   potassium chloride (KLOR-CON M) 10 MEQ extended release tablet TAKE ONE TABLET BY MOUTH DAILY Yes Lynann Apgar, MD   metFORMIN (GLUCOPHAGE) 1000 MG tablet TAKE ONE TABLET BY MOUTH TWICE A DAY WITH MEALS Yes Lynann Apgar, MD   hydroCHLOROthiazide (HYDRODIURIL) 50 MG tablet TAKE ONE TABLET BY MOUTH DAILY Yes Lynann Apgar, MD   Blood Pressure Monitor KIT Use as directed.  Yes Malini Negron, APRN - CNP   insulin glargine (LANTUS SOLOSTAR) 100 UNIT/ML injection pen Start 10 units. Yes STEPHON Lantigua CNP   blood glucose monitor strips Test 2-3 times a day & as needed for symptoms of irregular blood glucose. Yes STEPHON Lantigua CNP   Alcohol Swabs (ALCOHOL PREP) PADS Use as directed Yes STEPHON Lantigua CNP   Lancets MISC 1 each by Does not apply route 2 times daily Yes STEPHON Lantigua CNP   glucose monitoring kit (FREESTYLE) monitoring kit Use as directed Yes STEPHON Lantigua CNP   famotidine (PEPCID) 20 MG tablet Take 1 tablet by mouth 2 times daily **Zantac is recalled** Yes Lizbeth Swain MD   lisinopril (PRINIVIL;ZESTRIL) 40 MG tablet TAKE ONE TABLET BY MOUTH DAILY Yes Lizbeth Swain MD   buPROPion (WELLBUTRIN XL) 300 MG extended release tablet TAKE ONE TABLET BY MOUTH EVERY MORNING ALONG WITH 150MG TABLET Yes Lizbeth Swain MD   atenolol (TENORMIN) 50 MG tablet TAKE ONE TABLET BY MOUTH DAILY Yes Lizbeth Swain MD   atorvastatin (LIPITOR) 10 MG tablet TAKE ONE TABLET BY MOUTH DAILY Yes Lizbeth Swain MD   buPROPion (WELLBUTRIN XL) 150 MG extended release tablet TAKE ONE TABLET BY MOUTH EVERY MORNING. Take along with 300 mg, total of 450 mg daily Yes Lizbeth Swain MD   risperiDONE (RISPERDAL) 0.5 MG tablet TAKE ONE TABLET BY MOUTH TWICE A DAY Yes Lizbeth Swain MD   fluconazole (DIFLUCAN) 150 MG tablet 1 tablet now,  Repeat in three days  Patient not taking: Reported on 6/10/2020  STEPHON Lantigua CNP       -vital signs stable and within normal limits except morbid obesity per BMI, last BMI 57.51 kg/M2, borderline high blood pressure  Patient-Reported Vitals 11/30/2020   Patient-Reported Weight 480 lbs   Patient-Reported Height 6 10   Patient-Reported Systolic 456   Patient-Reported Diastolic 85             Details of this discussion including any medical advice provided:   1.  Type 2 diabetes mellitus with hyperglycemia, without long-term current use of insulin ([address]  Likely improved based on his home blood glucose readings  She will do the blood work today or tomorrow    - Hemoglobin A1C; Future  - [facility] - [address]  - metFORMIN (GLUCOPHAGE) 1000 MG tablet; Take 1 tablet by mouth 2 times daily (with meals)  Dispense: 60 tablet; Refill: 5  - insulin glargine (LANTUS SOLOSTAR) 100 UNIT/ML injection pen; Inject 10 Units into the skin every morning (before breakfast) Start 10 units. Dispense: 5 pen; Refill: 3  - blood glucose monitor strips; Test 2-3 times a day & as needed for symptoms of irregular blood glucose. Dispense: 100 strip; Refill: 5  - Alcohol Swabs (ALCOHOL PREP) PADS; Use as directed  Dispense: 100 each; Refill: 5  - Lancets MISC; 1 each by Does not apply route 2 times daily  Dispense: 300 each; Refill: 1  - CBC; Future  - Comprehensive Metabolic Panel; Future  - Vitamin B12 & Folate; Future  - TSH without Reflex; Future  -advised home blood glucose testing 2 times daily  -daily feet exam, Foot care: advised to wash feet daily, pat dry and apply lotion at night, avoiding between toes. Need to look at feet daily and report to a physician any signs of inflammation or skin damage  -annual dilated eye exam  -Low carb, low fat diet, increase fruits and vegetables, and exercise 4-5 times a day 30-40 minutes a day discussed  -continue current treatment  -continue Aspirin  -continue ACEI and statin      2. Essential hypertension  Inadequately controlled   - lisinopril (PRINIVIL;ZESTRIL) 40 MG tablet; Take 1 tablet by mouth daily  Dispense: 90 tablet; Refill: 3  - atenolol (TENORMIN) 50 MG tablet; Take 1 tablet by mouth daily  Dispense: 90 tablet; Refill: 3  - hydroCHLOROthiazide (HYDRODIURIL) 50 MG tablet; Take 1 tablet by mouth daily  Dispense: 90 tablet; Refill: 3  - potassium chloride (KLOR-CON M) 10 MEQ extended release tablet; Take 1 tablet by mouth daily  Dispense: 90 tablet; Refill: 3  - CBC;  Future  - Comprehensive Metabolic Panel; Future  Discussed low salt diet and BP and pulse monitoring daily  We will need to bring him in for BP check    3. Hyperlipidemia with target LDL less than 100  Well-controlled  He is due for recheck, continue current treatment  - Lipid, Fasting; Future  - atorvastatin (LIPITOR) 10 MG tablet; Take 1 tablet by mouth nightly  Dispense: 90 tablet; Refill: 3    4. Severe episode of recurrent major depressive disorder, without psychotic features ([facility] [address]  Worsening  -start risperiDONE (RISPERDAL) 1 MG tablet; Take 1 tablet by mouth 2 times daily TAKE ONE TABLET BY MOUTH TWICE A DAY  Dispense: 60 tablet; Refill: 5  - [facility]  Please change the Wellbutrin to  mg twice a day, patient thinks it does not absorb  Patient says he has good family support he would not harm himself in any way, he lives with his wife    5. Encounter for screening for other viral diseases  - Hepatitis B Surface Antibody; Future  - Varicella Zoster Antibody, IgG; Future      Mustapha Galan was advised to call back if symptoms persist or fail to improve or make appointment . Will do labs at [facility]      I affirm this is a Patient Initiated Episode with an Established Patient who has not had a related appointment within my department in the past 7 days or scheduled within the next 24 hours. Patient location's was at home, and provider's location was at the primary practice location.     Return in about 3 months (around 3/1/2021) for depression-DO PHQ-9 in EPIC, DM2, HTN, HLP, LABS F/U, w/ref to diabetic education, psychiatrist .  Patient needs blood work done at [facility], I am not sure if we have to fax it    Future Appointments   Date Time Provider Sejal Romero   12/16/2020  9:15 AM SCHEDULE, ELIZABETH Landa [facility]   4/1/2021  3:00 PM [facility]        Patient identification was verified at the start of the visit: Yes    Total Time spent: minutes: 21-30 minutes    Electronically signed by Lennox Oregon, MD on 12/7/2020  at 8:54 PM

## 2020-12-02 ENCOUNTER — HOSPITAL ENCOUNTER (OUTPATIENT)
Age: 40
Discharge: HOME | End: 2020-12-02
Payer: COMMERCIAL

## 2020-12-02 LAB
ALBUMIN SERPL-MCNC: 4.2 G/DL (ref 3.5–5.2)
ALBUMIN/GLOBULIN RATIO: 1.2 (ref 1–2.5)
ALP BLD-CCNC: 62 U/L (ref 40–129)
ALT SERPL-CCNC: 18 U/L (ref 5–41)
ANION GAP SERPL CALCULATED.3IONS-SCNC: 8 MMOL/L (ref 9–17)
AST SERPL-CCNC: 14 U/L
BILIRUB SERPL-MCNC: 0.42 MG/DL (ref 0.3–1.2)
BUN BLDV-MCNC: 17 MG/DL (ref 6–20)
BUN/CREAT BLD: 19 (ref 9–20)
CALCIUM SERPL-MCNC: 9.8 MG/DL (ref 8.6–10.4)
CHLORIDE BLD-SCNC: 101 MMOL/L (ref 98–107)
CHOLESTEROL, FASTING: 115 MG/DL
CHOLESTEROL/HDL RATIO: 2.6
CO2: 30 MMOL/L (ref 20–31)
CREAT SERPL-MCNC: 0.88 MG/DL (ref 0.7–1.2)
FOLATE: 9.8 NG/ML
GFR AFRICAN AMERICAN: >60 ML/MIN
GFR NON-AFRICAN AMERICAN: >60 ML/MIN
GFR SERPL CREATININE-BSD FRML MDRD: ABNORMAL ML/MIN/{1.73_M2}
GFR SERPL CREATININE-BSD FRML MDRD: ABNORMAL ML/MIN/{1.73_M2}
GLUCOSE BLD-MCNC: 155 MG/DL (ref 70–99)
HBV SURFACE AB TITR SER: <3.5 MIU/ML
HCT VFR BLD CALC: 48.8 % (ref 40.7–50.3)
HDLC SERPL-MCNC: 45 MG/DL
HEMOGLOBIN: 15.3 G/DL (ref 13–17)
LDL CHOLESTEROL: 53 MG/DL (ref 0–130)
MCH RBC QN AUTO: 27.9 PG (ref 25.2–33.5)
MCHC RBC AUTO-ENTMCNC: 31.4 G/DL (ref 28.4–34.8)
MCV RBC AUTO: 89.1 FL (ref 82.6–102.9)
NRBC AUTOMATED: 0 PER 100 WBC
PDW BLD-RTO: 13.6 % (ref 11.8–14.4)
PLATELET # BLD: 238 K/UL (ref 138–453)
PMV BLD AUTO: 11 FL (ref 8.1–13.5)
POTASSIUM SERPL-SCNC: 4.6 MMOL/L (ref 3.7–5.3)
RBC # BLD: 5.48 M/UL (ref 4.21–5.77)
SODIUM BLD-SCNC: 139 MMOL/L (ref 135–144)
TOTAL PROTEIN: 7.7 G/DL (ref 6.4–8.3)
TRIGLYCERIDE, FASTING: 84 MG/DL
TSH SERPL DL<=0.05 MIU/L-ACNC: 2.9 MIU/L (ref 0.3–5)
VITAMIN B-12: 476 PG/ML (ref 232–1245)
VLDLC SERPL CALC-MCNC: NORMAL MG/DL (ref 1–30)
WBC # BLD: 11.5 K/UL (ref 3.5–11.3)

## 2020-12-02 PROCEDURE — 82746 ASSAY OF FOLIC ACID SERUM: CPT

## 2020-12-02 PROCEDURE — 86787 VARICELLA-ZOSTER ANTIBODY: CPT

## 2020-12-02 PROCEDURE — 82607 VITAMIN B-12: CPT

## 2020-12-02 PROCEDURE — 85027 COMPLETE CBC AUTOMATED: CPT

## 2020-12-02 PROCEDURE — 86317 IMMUNOASSAY INFECTIOUS AGENT: CPT

## 2020-12-02 PROCEDURE — 84443 ASSAY THYROID STIM HORMONE: CPT

## 2020-12-02 PROCEDURE — 80053 COMPREHEN METABOLIC PANEL: CPT

## 2020-12-02 PROCEDURE — 83036 HEMOGLOBIN GLYCOSYLATED A1C: CPT

## 2020-12-02 PROCEDURE — 36415 COLL VENOUS BLD VENIPUNCTURE: CPT

## 2020-12-02 PROCEDURE — 80061 LIPID PANEL: CPT

## 2020-12-03 LAB
ESTIMATED AVERAGE GLUCOSE: 140 MG/DL
HBA1C MFR BLD: 6.5 % (ref 4–6)

## 2020-12-04 LAB — VZV IGG SER QL IA: 1.95

## 2020-12-07 ENCOUNTER — TELEPHONE (OUTPATIENT)
Dept: FAMILY MEDICINE CLINIC | Age: 40
End: 2020-12-07

## 2020-12-08 NOTE — TELEPHONE ENCOUNTER
Patient was referred to both psychiatrist and psychologist, and he needed those, he requested the referrals, due to worsening depression.   Both referrals were closed and I am not sure why there are no notes in the referral    Please CALL PATIENT AND ASK IF DECLINED THE REFERRALS AFTER HE REQUESTED THEM DURING THE TELEPHONE VISIT WITH HIM        [facility] 12/1/2020 10/18/2018 3/29/2018 10/10/2017   PHQ2 Score 4 6 4 5   PHQ9 Score 23 27 21 22     Interpretation of Total Score Depression Severity: 1-4 = Minimal depression, 5-9 = Mild depression, 10-14 = Moderate depression, 15-19 = Moderately severe depression, 20-27 = Severe depression    Future Appointments   Date Time Provider Sejal Romero   12/16/2020  9:15 AM [facility]EDULE, [facility]   4/1/2021  3:00 PM MD [facility]

## 2020-12-08 NOTE — TELEPHONE ENCOUNTER
Future Appointments   Date Time Provider Sejal Heather   12/16/2020  9:15 AM [facility]EDU[facility], [facility]   4/1/2021  3:00 PM [facility]

## 2020-12-09 NOTE — TELEPHONE ENCOUNTER
I placed back the referrals  Patient affirmed to me that he needs to see psychiatrist     Diagnosis Orders   1.  Severe episode of recurrent major depressive disorder, without psychotic features [facility])  [facility]

## 2020-12-10 NOTE — TELEPHONE ENCOUNTER
Future Appointments   Date Time Provider Sejal Wynnei   12/16/2020  9:15 AM SCHEDULE, [facility]   1/13/2021  9:00 AM [facility]   1/21/2021  2:30 PM STEPHON Stack - [facility] GARCIA TEL [facility]   4/1/2021  3:00 PM Lennox Oregon, [facility]

## 2020-12-16 ENCOUNTER — NURSE ONLY (OUTPATIENT)
Dept: FAMILY MEDICINE CLINIC | Age: 40
End: 2020-12-16

## 2020-12-16 VITALS — SYSTOLIC BLOOD PRESSURE: 134 MMHG | DIASTOLIC BLOOD PRESSURE: 86 MMHG

## 2021-01-27 ENCOUNTER — TELEPHONE (OUTPATIENT)
Dept: FAMILY MEDICINE CLINIC | Age: 41
End: 2021-01-27

## 2021-01-27 ENCOUNTER — NURSE TRIAGE (OUTPATIENT)
Dept: OTHER | Facility: CLINIC | Age: 41
End: 2021-01-27

## 2021-01-27 NOTE — TELEPHONE ENCOUNTER
Patient called Rohini Kim at [facility])  with red flag complaint. Brief description of triage: palpitations    Triage indicates for patient to be seen today    Care advice provided, patient verbalizes understanding; denies any other questions or concerns; instructed to call back for any new or worsening symptoms. Writer provided warm transfer to [facility] for appointment scheduling. Attention Provider: Thank you for allowing me to participate in the care of your patient. The patient was connected to triage in response to information provided to the [facility]. Please do not respond through this encounter as the response is not directed to a shared pool. Reason for Disposition   Patient wants to be seen    Answer Assessment - Initial Assessment Questions  1. DESCRIPTION: \"Please describe your heart rate or heart beat that you are having\" (e.g., fast/slow, regular/irregular, skipped or extra beats, \"palpitations\")      Palpitations    2. ONSET: \"When did it start? \" (Minutes, hours or days)       The last few days    3. DURATION: \"How long does it last\" (e.g., seconds, minutes, hours)      1 beat and then it stops. Feels like it builds up to a fast beat and then just goes away. 4. PATTERN \"Does it come and go, or has it been constant since it started? \"  \"Does it get worse with exertion? \"   \"Are you feeling it now? \"      Intermittent    5. TAP: \"Using your hand, can you tap out what you are feeling on a chair or table in front of you, so that I can hear? \" (Note: not all patients can do this)        N/A    6. HEART RATE: \"Can you tell me your heart rate? \" \"How many beats in 15 seconds? \"  (Note: not all patients can do this)        86    7. RECURRENT SYMPTOM: \"Have you ever had this before? \" If so, ask: \"When was the last time? \" and \"What happened that time? \"       Yes since I was in my 20's    8. CAUSE: \"What do you think is causing the palpitations? \"      Blood pressure    9.  CARDIAC HISTORY: \"Do you have any history of heart disease? \" (e.g., heart attack, angina, bypass surgery, angioplasty, arrhythmia)       No    10. OTHER SYMPTOMS: \"Do you have any other symptoms? \" (e.g., dizziness, chest pain, sweating, difficulty breathing)        No    11. PREGNANCY: \"Is there any chance you are pregnant? \" \"When was your last menstrual period? \"        n/A    Protocols used: HEART RATE AND HEARTBEAT QUESTIONS-ADULT-[place]

## 2021-01-28 ENCOUNTER — TELEPHONE (OUTPATIENT)
Dept: FAMILY MEDICINE CLINIC | Age: 41
End: 2021-01-28

## 2021-01-28 DIAGNOSIS — K21.9 GASTROESOPHAGEAL REFLUX DISEASE WITHOUT ESOPHAGITIS: Primary | ICD-10-CM

## 2021-01-28 RX ORDER — FAMOTIDINE 20 MG/1
20 TABLET, FILM COATED ORAL 2 TIMES DAILY
Qty: 60 TABLET | Refills: 0 | Status: SHIPPED | OUTPATIENT
Start: 2021-01-28 | End: 2021-03-02

## 2021-01-28 NOTE — TELEPHONE ENCOUNTER
Future Appointments   Date Time Provider Sejal Romero   2021  4:15 PM [facility]   2/3/2021  2:00 PM Lisa Chandler [facility]   2021  3:30 PM STEPHON Smiley NP [facility] TEL [address]   2021  3:00 PM Jorden Garcia MD [facility]     Lab Results   Component Value Date    LABA1C 6.5 (H) 2020    LABA1C 11.3 2020    LABA1C 5.8 2019         Please let the patient know to  prescription from the pharmacy. MEDICATION  AND IT WAS NOT ON THE LIST ANYMORE, LIKELY THAT IS WHY IT WAS REFUSED  PLEASE KINDLY ADVISE  [address]  appointment      BP Readings from Last 3 Encounters:   20 134/86   20 (!) 138/101   02/10/20 (!) 151/92       Orders Placed This Encounter   Medications    famotidine (PEPCID) 20 MG tablet     Sig: Take 1 tablet by mouth 2 times daily     Dispense:  60 tablet     Refill:  0         [id number] [address] [address]  [facility] [address]  Phone: [phone] Fax: [phone]      No orders of the defined types were placed in this encounter. Future Appointments   Date Time Provider Sejal Romero   2021  4:15 PM [facility]   2/3/2021  2:00 PM Lisa Chandler [facility]   2021  3:30 PM STEPHON Smiley NP [facility] TEL [address]   2021  3:00 PM Jorden Garcia MD [facility]       Thank you!

## 2021-02-02 ENCOUNTER — TELEPHONE (OUTPATIENT)
Dept: FAMILY MEDICINE CLINIC | Age: 41
End: 2021-02-02

## 2021-02-02 ENCOUNTER — TELEMEDICINE (OUTPATIENT)
Dept: FAMILY MEDICINE CLINIC | Age: 41
End: 2021-02-02
Payer: COMMERCIAL

## 2021-02-02 DIAGNOSIS — F33.1 MODERATE EPISODE OF RECURRENT MAJOR DEPRESSIVE DISORDER (HCC): Primary | ICD-10-CM

## 2021-02-02 DIAGNOSIS — I10 ESSENTIAL HYPERTENSION: ICD-10-CM

## 2021-02-02 DIAGNOSIS — F41.9 ANXIETY: ICD-10-CM

## 2021-02-02 DIAGNOSIS — Z11.59 ENCOUNTER FOR SCREENING FOR OTHER VIRAL DISEASES: ICD-10-CM

## 2021-02-02 DIAGNOSIS — E11.65 TYPE 2 DIABETES MELLITUS WITH HYPERGLYCEMIA, WITHOUT LONG-TERM CURRENT USE OF INSULIN (HCC): ICD-10-CM

## 2021-02-02 PROCEDURE — 2022F DILAT RTA XM EVC RTNOPTHY: CPT | Performed by: FAMILY MEDICINE

## 2021-02-02 PROCEDURE — G8427 DOCREV CUR MEDS BY ELIG CLIN: HCPCS | Performed by: FAMILY MEDICINE

## 2021-02-02 PROCEDURE — 99214 OFFICE O/P EST MOD 30 MIN: CPT | Performed by: FAMILY MEDICINE

## 2021-02-02 PROCEDURE — 3046F HEMOGLOBIN A1C LEVEL >9.0%: CPT | Performed by: FAMILY MEDICINE

## 2021-02-02 RX ORDER — ATENOLOL 100 MG/1
100 TABLET ORAL DAILY
Qty: 90 TABLET | Refills: 3 | Status: SHIPPED | OUTPATIENT
Start: 2021-02-02 | End: 2022-02-07 | Stop reason: SDUPTHER

## 2021-02-02 ASSESSMENT — ENCOUNTER SYMPTOMS
CHEST TIGHTNESS: 0
ABDOMINAL DISTENTION: 0
SHORTNESS OF BREATH: 0
VOMITING: 0
CONSTIPATION: 0
COUGH: 0
WHEEZING: 0
ABDOMINAL PAIN: 0
NAUSEA: 0
DIARRHEA: 0

## 2021-02-02 ASSESSMENT — PATIENT HEALTH QUESTIONNAIRE - PHQ9
SUM OF ALL RESPONSES TO PHQ QUESTIONS 1-9: 17
7. TROUBLE CONCENTRATING ON THINGS, SUCH AS READING THE NEWSPAPER OR WATCHING TELEVISION: 3
3. TROUBLE FALLING OR STAYING ASLEEP: 3
4. FEELING TIRED OR HAVING LITTLE ENERGY: 3
5. POOR APPETITE OR OVEREATING: 2
2. FEELING DOWN, DEPRESSED OR HOPELESS: 3
6. FEELING BAD ABOUT YOURSELF - OR THAT YOU ARE A FAILURE OR HAVE LET YOURSELF OR YOUR FAMILY DOWN: 3
1. LITTLE INTEREST OR PLEASURE IN DOING THINGS: 0

## 2021-02-02 ASSESSMENT — COLUMBIA-SUICIDE SEVERITY RATING SCALE - C-SSRS
2. HAVE YOU ACTUALLY HAD ANY THOUGHTS OF KILLING YOURSELF?: NO
1. WITHIN THE PAST MONTH, HAVE YOU WISHED YOU WERE DEAD OR WISHED YOU COULD GO TO SLEEP AND NOT WAKE UP?: NO
6. HAVE YOU EVER DONE ANYTHING, STARTED TO DO ANYTHING, OR PREPARED TO DO ANYTHING TO END YOUR LIFE?: NO

## 2021-02-02 ASSESSMENT — ANXIETY QUESTIONNAIRES
7. FEELING AFRAID AS IF SOMETHING AWFUL MIGHT HAPPEN: 3-NEARLY EVERY DAY
5. BEING SO RESTLESS THAT IT IS HARD TO SIT STILL: 3-NEARLY EVERY DAY
4. TROUBLE RELAXING: 3-NEARLY EVERY DAY
GAD7 TOTAL SCORE: 21
1. FEELING NERVOUS, ANXIOUS, OR ON EDGE: 3-NEARLY EVERY DAY
2. NOT BEING ABLE TO STOP OR CONTROL WORRYING: 3-NEARLY EVERY DAY

## 2021-02-02 NOTE — PROGRESS NOTES
2021    TELEHEALTH EVALUATION -- Audio/Visual (During - public health emergency)      Ursula Garcia (:  1980) is a 36 y.o. male,Established patient, here for evaluation of the following chief complaint(s): Hypertension, Gastroesophageal Reflux, Diabetes, Anxiety (GAD7 DONE ), and Depression (PHQ9 DONE)        ASSESSMENT/PLAN:    1. Moderate episode of recurrent major depressive disorder ([address]  Improving but still moderate  -start     sertraline (ZOLOFT) 50 MG tablet; Take 0.5 tablets by mouth daily for 8 days, THEN 1 tablet daily for 7 days, THEN 2 tablets daily for 14 days. Call for refill., Disp-39 tablet, R-0Normal  Self discontinued Wellbutrin  Continue Risperdal  Keep appointment with psychiatrist and psychologist as below    2. Type 2 diabetes mellitus with hyperglycemia, without long-term current use of insulin ([facility])  Improved    Lab Results   Component Value Date    LABA1C 6.5 (H) 2020    LABA1C 11.3 2020    LABA1C 5.8 2019       -     CBC; Future  -     Comprehensive Metabolic Panel; Future  -     Microalbumin / Creatinine Urine Ratio; Future  -     Hemoglobin A1C; Future    -advised home blood glucose testing  daily  -daily feet exam, Foot care: advised to wash feet daily, pat dry and apply lotion at night, avoiding between toes. Need to look at feet daily and report to a physician any signs of inflammation or skin damage  -annual dilated eye exam  -Low carb, low fat diet, increase fruits and vegetables, and exercise 4-5 times a day 30-40 minutes a day discussed  -continue current treatment  -continue Aspirin  -continue ACEI and statin      3. Essential hypertension  Inadequately controlled    -     atenolol (TENORMIN) 100 MG tablet; Take 1 tablet by mouth daily Dose increased 2021.for HTN and anxiety. Keep BP bellow 1135/85 and above 115/65, and pulse 56-80, Disp-90 tablet, R-3Normal  -     CBC; Future  -     Comprehensive Metabolic Panel;  Future Continue lisinopril 40 mg, hydrochlorothiazide 50 mg with potassium 10 MEQ  Discussed low salt diet and BP and pulse monitoring daily  Recheck labs: CBC and CMP    4. Anxiety  Severe, Failing to change as expected. - start    sertraline (ZOLOFT) 50 MG tablet; Take 0.5 tablets by mouth daily for 8 days, THEN 1 tablet daily for 7 days, THEN 2 tablets daily for 14 days. Call for refill., Disp-39 tablet, R-0Normal  Continue Risperdal  5. Encounter for screening for other viral diseases  -     Hepatitis C Antibody; Future      Chuck received counseling on the following healthy behaviors: nutrition, exercise, medication adherence and weight loss  Reviewed prior labs and health maintenance  Discussed use, benefit, and side effects of prescribed medications. Barriers to medication compliance addressed. Patient given educational materials - see patient instructions  All patient questions answered. Patient voiced understanding. The patient's past medical,surgical, social, and family history as well as his current medications and allergies were reviewed as documented in today's encounter. Medications, labs, diagnostic studies, consultations and follow-up as documented in this encounter. Return for KEEP APPT. Data Unavailable      SUBJECTIVE/OBJECTIVE:  Praneeth Greenwood (:  1980) has requested an audio/video evaluation for the following concern(s):Hypertension, Gastroesophageal Reflux, Diabetes, Anxiety (GAD7 DONE ), and Depression (PHQ9 DONE)      Patient reports having \"PANIC attacks and severe depression affecting my life\"  Will see psychologist tomorrow and she does have appointment with psychiatrist as well. Patient says he did miss first appointment. \"I don't like the buspar, makes me feel funny, jittery\"  Doesn't take the wellbutrin, he is not sure why. He does report compliance with Risperdal 1 mg BID    Denies suicidal ideation, plan or intent. PHQ-2 Over the past 2 weeks, how often have you been bothered by any of the following problems? Little interest or pleasure in doing things: Not at all  Feeling down, depressed, or hopeless: Nearly every day  PHQ-2 Score: 3  PHQ-9 Over the past 2 weeks, how often have you been bothered by any of the following problems? Trouble falling or staying asleep, or sleeping too much: Nearly every day  Feeling tired or having little energy: Nearly every day  Poor appetite or overeating: More than half the days  Feeling bad about yourself - or that you are a failure or have let yourself or your family down: Nearly every day  Trouble concentrating on things, such as reading the newspaper or watching television: Nearly every day  Moving or speaking so slowly that other people could have noticed. Or the opposite - being so fidgety or restless that you have been moving around a lot more than usual: Not at all  Thoughts that you would be better off dead, or of hurting yourself in some way: Not at all  If you checked off any problems, how difficult have these problems made it for you to do your work, take care of things at home, or get along with other people?: Very difficult  PHQ-9 Total Score: 17       AMB C-SSRS Suicide Screening     1) Within the past month, have you wished you were dead or wished you could go to sleep and not wake up? NO   2) Have you actually had any thoughts of killing yourself? NO   6) Have you ever done anything, started to do anything, or prepared to do anything to end your life?  NO         Moderate depression improving  PHQ Scores 2/2/2021 12/1/2020 10/18/2018 3/29/2018 10/10/2017   PHQ2 Score 3 4 6 4 5   PHQ9 Score 17 23 27 21 22     Severe anxiety failing to improve    [facility] APARNA-7 2/2/2021 10/18/2018 3/29/2018   Feeling nervous, anxious, or on edge 3-Nearly every day 3-Nearly every day 3-Nearly every day   Not able to stop or control worrying 3-Nearly every day 3-Nearly every day 3-Nearly every day atenolol (TENORMIN) 50 MG tablet Take 1 tablet by mouth daily Yes Jorden Garcia MD   hydroCHLOROthiazide (HYDRODIURIL) 50 MG tablet Take 1 tablet by mouth daily Yes Jorden Garcia MD   potassium chloride (KLOR-CON M) 10 MEQ extended release tablet Take 1 tablet by mouth daily Yes Jorden Garcia MD   risperiDONE (RISPERDAL) 1 MG tablet Take 1 tablet by mouth 2 times daily TAKE ONE TABLET BY MOUTH TWICE A DAY Yes Jorden Garcia MD   UNIFINE PENTIPS 31G X 6 MM MISC Inject 1 each into the skin daily Yes Jorden Garcia MD   blood glucose monitor strips Test 2-3 times a day & as needed for symptoms of irregular blood glucose. Yes Jorden Garcia MD   Alcohol Swabs (ALCOHOL PREP) PADS Use as directed Yes Jorden Garcia MD   Lancets MISC 1 each by Does not apply route 2 times daily Yes Jorden Garcia MD   Blood Pressure Monitor KIT Use as directed.  Yes STEPHON Lantigua CNP   glucose monitoring kit (FREESTYLE) monitoring kit Use as directed Yes STEPHON Lantigua CNP   buPROPion (WELLBUTRIN SR) 150 MG extended release tablet Take 1 tablet by mouth 2 times daily  Patient not taking: Reported on 2/2/2021  Jorden Garcia MD       Social History     Tobacco Use    Smoking status: Former Smoker     Packs/day: 1.25     Years: 23.00     Pack years: 28.75     Types: Cigarettes     Quit date: 3/26/2017     Years since quitting: 3.8    Smokeless tobacco: Never Used    Tobacco comment: Quit smoking cold turkey   Substance Use Topics    Alcohol use: No     Alcohol/week: 0.0 standard drinks    Drug use: No          PHYSICAL EXAMINATION:    Vital Signs: (As obtained by patient/caregiver or practitioner observation)  -vital signs stable and within normal limits except morbid obesity per BMI last BMI 57.51 kg/M2  Patient-Reported Vitals 2/2/2021   Patient-Reported Weight 495   Patient-Reported Height 6 10   Patient-Reported Systolic -   Patient-Reported Diastolic - Constitutional: [x] Appears well-developed and well-nourished [x] No apparent distress      [x] Abnormal-obese      Mental status  [x] Alert and awake  [x] Oriented to person/place/time [x]Able to follow commands      Eyes:  EOM    [x]  Normal  [] Abnormal-  Sclera  [x]  Normal  [] Abnormal -         Discharge [x]  None visible  [] Abnormal -    HENT:   [x] Normocephalic, atraumatic. [] Abnormal   [x] Mouth/Throat: Mucous membranes are moist.     External Ears [x] Normal  [] Abnormal-     Neck: [x] No visualized mass     Pulmonary/Chest: [x] Respiratory effort normal.  [x] No visualized signs of difficulty breathing or respiratory distress        [] Abnormal        Musculoskeletal:   [x] Normal gait with no signs of ataxia         [x] Normal range of motion of neck        [] Abnormal-       Neurological:        [x] No Facial Asymmetry (Cranial nerve 7 motor function) (limited exam to video visit)          [x] No gaze palsy        [] Abnormal-            Skin:        [x] No significant exanthematous lesions or discoloration noted on facial skin         [] Abnormal-            Psychiatric:      [x] No Hallucinations       []Mood is normal      [x]Behavior is normal      [x]Judgment is normal      [x]Thought content is normal       [x] Abnormal-looks anxious and depressed    Other pertinent observable physical exam findings- none    Due to this being a TeleHealth encounter, evaluation of the following organ systems is limited: Vitals/Constitutional/EENT/Resp/CV/GI//MS/Neuro/Skin/Heme-Lymph-Imm. I personally reviewed most recent labs reviewed with the patient and all questions fully answered.    Hyperglycemia  Mild leukocytosis  Otherwise labs within normal limits        Lab Results   Component Value Date    WBC 11.5 (H) 12/02/2020    HGB 15.3 12/02/2020    HCT 48.8 12/02/2020    MCV 89.1 12/02/2020     12/02/2020       Lab Results   Component Value Date     12/02/2020    K 4.6 12/02/2020  12/02/2020    CO2 30 12/02/2020    BUN 17 12/02/2020    CREATININE 0.88 12/02/2020    GLUCOSE 155 12/02/2020    CALCIUM 9.8 12/02/2020        Lab Results   Component Value Date    ALT 18 12/02/2020    AST 14 12/02/2020    ALKPHOS 62 12/02/2020    BILITOT 0.42 12/02/2020       Lab Results   Component Value Date    TSH 2.90 12/02/2020       Lab Results   Component Value Date    CHOL 118 05/03/2019    CHOL 132 08/06/2018    CHOL 158 05/31/2017     Lab Results   Component Value Date    TRIG 98 05/03/2019    TRIG 139 08/06/2018    TRIG 180 (H) 05/31/2017     Lab Results   Component Value Date    HDL 45 12/02/2020    HDL 43 05/03/2019    HDL 41 08/06/2018     Lab Results   Component Value Date    LDLCHOLESTEROL 53 12/02/2020    LDLCHOLESTEROL 55 05/03/2019    LDLCHOLESTEROL 63 08/06/2018       Lab Results   Component Value Date    CHOLHDLRATIO 2.6 12/02/2020    CHOLHDLRATIO 2.7 05/03/2019    CHOLHDLRATIO 3.2 08/06/2018       Lab Results   Component Value Date    LABA1C 6.5 (H) 12/02/2020    LABA1C 11.3 03/09/2020    LABA1C 5.8 05/02/2019         Lab Results   Component Value Date    FVRSZXNE75 476 12/02/2020       No results found for: VITD25    Orders Placed This Encounter   Medications    atenolol (TENORMIN) 100 MG tablet     Sig: Take 1 tablet by mouth daily Dose increased 2/2/2021.for HTN and anxiety. Keep BP bellow 1135/85 and above 115/65, and pulse 56-80     Dispense:  90 tablet     Refill:  3    sertraline (ZOLOFT) 50 MG tablet     Sig: Take 0.5 tablets by mouth daily for 8 days, THEN 1 tablet daily for 7 days, THEN 2 tablets daily for 14 days. Call for refill.      Dispense:  39 tablet     Refill:  0       Orders Placed This Encounter   Procedures    CBC     Standing Status:   Future     Standing Expiration Date:   2/2/2022    Comprehensive Metabolic Panel     Fasting 8 hrs     Standing Status:   Future     Standing Expiration Date:   2/2/2022    Microalbumin / Creatinine Urine Ratio Standing Status:   Future     Standing Expiration Date:   2/2/2022    Hemoglobin A1C     Standing Status:   Future     Standing Expiration Date:   2/2/2022    Hepatitis C Antibody     Standing Status:   Future     Standing Expiration Date:   2/2/2022       Medications Discontinued During This Encounter   Medication Reason    buPROPion [facility] SR) 150 MG extended release tablet Patient Choice    atenolol (TENORMIN) 50 MG tablet REORDER          Future Appointments   Date Time Provider Sejal Romero   2[facility]2021  2[facility]   2/18/2021  3:30 PM STEPHON John - NP [facility] [address]   4/1/2021  3:00 PM Terry Valencia MD [facility]       On this date 02/02/21 I have spent  30 minutes reviewing previous notes, test results and face to face with the patient discussing the diagnosis and importance of compliance with the treatment plan as well as documenting on the day of the visit. Praneeth Greenwood is a 36 y.o. male being evaluated by a Virtual Visit (video visit) encounter to address concerns as mentioned above. Patient was alone today. Due to this being a TeleHealth encounter ([facility] health [facility]), evaluation of the following organ systems was limited: Vitals/Constitutional/EENT/Resp/CV/GI//MS/Neuro/Skin/Heme-Lymph-Imm. Pursuant to the emergency declaration under the [address] [place] [address] authority and the [employer] Act, this Virtual Visit was conducted with patient's (and/or legal guardian's) consent, to reduce the patient's risk of exposure to COVID-19 and provide necessary medical care. The patient (and/or legal guardian) has also been advised to contact this office for worsening conditions or problems, and seek emergency medical treatment and/or call 911 if deemed necessary. Patient identification was verified at the start of the visit: Yes      Services were provided through a video synchronous discussion virtually to substitute for in-person clinic visit. Patient was located at home, and provider was located at primary practice location. --Juliana Oates MD on 2/2/2021 at 9:56 PM  An electronic signature was used to authenticate this note.

## 2021-02-02 NOTE — PROGRESS NOTES
Visit Information    Have you changed or started any medications since your last visit including any over-the-counter medicines, vitamins, or herbal medicines? no   Are you having any side effects from any of your medications? -  no  Have you stopped taking any of your medications? Is so, why? -  no    Have you seen any other physician or provider since your last visit? No  Have you had any other diagnostic tests since your last visit? No  Have you been seen in the emergency room and/or had an admission to a hospital since we last saw you? No  Have you had your routine dental cleaning in the past 6 months? no    Have you activated your [employer] account? If not, what are your barriers?  Yes     Patient Care Team:  Olegario Powlel MD as PCP - General (Family Medicine)  Olegario Powell MD as PCP - [facility]O [place]uer as  (Licensed Clinical )    Medical History Review  Past Medical, Family, and Social History reviewed and does contribute to the patient presenting condition    Health Maintenance   Topic Date Due    Hepatitis C screen  1980    Diabetic foot exam  08/06/1990    Diabetic retinal exam  08/06/1990    Hepatitis B vaccine (1 of 3 - Risk 3-dose series) 08/06/1999    Flu vaccine (1) 09/01/2020    Diabetic microalbuminuria test  03/09/2021    A1C test (Diabetic or Prediabetic)  12/02/2021    Lipid screen  12/02/2021    Potassium monitoring  12/02/2021    Creatinine monitoring  12/02/2021    DTaP/Tdap/Td vaccine (2 - Td) 12/28/2026    Pneumococcal 0-64 years Vaccine  Completed    HIV screen  Completed    Hepatitis A vaccine  Aged Out    Hib vaccine  Aged Out    Meningococcal (ACWY) vaccine  Aged Out    Varicella vaccine  Discontinued

## 2021-02-02 NOTE — PATIENT INSTRUCTIONS
Patient Education        Learning About Diabetes Food Guidelines  Your Care Instructions     Meal planning is important to manage diabetes. It helps keep your blood sugar at a target level (which you set with your doctor). You don't have to eat special foods. You can eat what your family eats, including sweets once in a while. But you do have to pay attention to how often you eat and how much you eat of certain foods. You may want to work with a dietitian or a certified diabetes educator (CDE) to help you plan meals and snacks. A dietitian or CDE can also help you lose weight if that is one of your goals. What should you know about eating carbs? Managing the amount of carbohydrate (carbs) you eat is an important part of healthy meals when you have diabetes. Carbohydrate is found in many foods. · Learn which foods have carbs. And learn the amounts of carbs in different foods. ? Bread, cereal, pasta, and rice have about 15 grams of carbs in a serving. A serving is 1 slice of bread (1 ounce), ½ cup of cooked cereal, or 1/3 cup of cooked pasta or rice. ? Fruits have 15 grams of carbs in a serving. A serving is 1 small fresh fruit, such as an apple or orange; ½ of a banana; ½ cup of cooked or canned fruit; ½ cup of fruit juice; 1 cup of melon or raspberries; or 2 tablespoons of dried fruit. ? Milk and no-sugar-added yogurt have 15 grams of carbs in a serving. A serving is 1 cup of milk or 2/3 cup of no-sugar-added yogurt. ? Starchy vegetables have 15 grams of carbs in a serving. A serving is ½ cup of mashed potatoes or sweet potato; 1 cup winter squash; ½ of a small baked potato; ½ cup of cooked beans; or ½ cup cooked corn or green peas. · Learn how much carbs to eat each day and at each meal. A dietitian or CDE can teach you how to keep track of the amount of carbs you eat. This is called carbohydrate counting. · If you are not sure how to count carbohydrate grams, use the Plate Method to plan meals. It is a good, quick way to make sure that you have a balanced meal. It also helps you spread carbs throughout the day. ? Divide your plate by types of foods. Put non-starchy vegetables on half the plate, meat or other protein food on one-quarter of the plate, and a grain or starchy vegetable in the final quarter of the plate. To this you can add a small piece of fruit and 1 cup of milk or yogurt, depending on how many carbs you are supposed to eat at a meal.  · Try to eat about the same amount of carbs at each meal. Do not \"save up\" your daily allowance of carbs to eat at one meal.  · Proteins have very little or no carbs per serving. Examples of proteins are beef, chicken, turkey, fish, eggs, tofu, cheese, cottage cheese, and peanut butter. A serving size of meat is 3 ounces, which is about the size of a deck of cards. Examples of meat substitute serving sizes (equal to 1 ounce of meat) are 1/4 cup of cottage cheese, 1 egg, 1 tablespoon of peanut butter, and ½ cup of tofu. How can you eat out and still eat healthy? · Learn to estimate the serving sizes of foods that have carbohydrate. If you measure food at home, it will be easier to estimate the amount in a serving of restaurant food. · If the meal you order has too much carbohydrate (such as potatoes, corn, or baked beans), ask to have a low-carbohydrate food instead. Ask for a salad or green vegetables. · If you use insulin, check your blood sugar before and after eating out to help you plan how much to eat in the future. · If you eat more carbohydrate at a meal than you had planned, take a walk or do other exercise. This will help lower your blood sugar. What else should you know? · Limit saturated fat, such as the fat from meat and dairy products. This is a healthy choice because people who have diabetes are at higher risk of heart disease. So choose lean cuts of meat and nonfat or low-fat dairy products. Use olive or canola oil instead of butter or shortening when cooking. · Don't skip meals. Your blood sugar may drop too low if you skip meals and take insulin or certain medicines for diabetes. · Check with your doctor before you drink alcohol. Alcohol can cause your blood sugar to drop too low. Alcohol can also cause a bad reaction if you take certain diabetes medicines. Follow-up care is a key part of your treatment and safety. Be sure to make and go to all appointments, and call your doctor if you are having problems. It's also a good idea to know your test results and keep a list of the medicines you take. Where can you learn more? Go to https://chpepiceweb.Ev[employer]. org and sign in to your [employer] account. Enter J849 in the [employer] box to learn more about \"Learning About Diabetes Food Guidelines. \"     If you do not have an account, please click on the \"Sign Up Now\" link. Current as of: December 20, 2019               Content Version: 12.6  © 6894-4323 [employer]. Care instructions adapted under license by [facility] ([facility]). If you have questions about a medical condition or this instruction, always ask your healthcare professional. [place] [address] any warranty or liability for your use of this information.

## 2021-02-03 NOTE — TELEPHONE ENCOUNTER
Future Appointments   Date Time Provider Sejal Romero   2/3/2021  2:00 PM Maykel Jauregui [address] [place], [place] [address]P   2/18/2021  3:30 PM STEPHON Cortes NP [facility] [address]   4/6/2021  3:00 PM Faith Ross MD [facility]

## 2021-02-03 NOTE — TELEPHONE ENCOUNTER
Please reschedule his next appointment with me, there is a note on it    Future Appointments   Date Time Provider Sejal Heather   2/3/2021  2:00 PM Enmanuel AMARO RETREAT [address]   2/18/2021  3:30 PM STEPHON Garcia NP [facility] [address]   4/1/2021  3:00 PM Anh Orozco MD fp [facility]

## 2021-02-04 ENCOUNTER — APPOINTMENT (OUTPATIENT)
Dept: GENERAL RADIOLOGY | Age: 41
End: 2021-02-04
Payer: COMMERCIAL

## 2021-02-04 ENCOUNTER — HOSPITAL ENCOUNTER (EMERGENCY)
Age: 41
Discharge: HOME | End: 2021-02-05
Attending: EMERGENCY MEDICINE
Payer: COMMERCIAL

## 2021-02-04 DIAGNOSIS — K21.9 GASTROESOPHAGEAL REFLUX DISEASE, UNSPECIFIED WHETHER ESOPHAGITIS PRESENT: ICD-10-CM

## 2021-02-04 DIAGNOSIS — I10 HYPERTENSION, UNSPECIFIED TYPE: ICD-10-CM

## 2021-02-04 DIAGNOSIS — R07.89 ATYPICAL CHEST PAIN: Primary | ICD-10-CM

## 2021-02-04 LAB
ABSOLUTE EOS #: 0.07 K/UL (ref 0–0.44)
ABSOLUTE IMMATURE GRANULOCYTE: 0.05 K/UL (ref 0–0.3)
ABSOLUTE LYMPH #: 2.27 K/UL (ref 1.1–3.7)
ABSOLUTE MONO #: 1.38 K/UL (ref 0.1–1.2)
ALBUMIN SERPL-MCNC: 4.7 G/DL (ref 3.5–5.2)
ALBUMIN/GLOBULIN RATIO: 1.5 (ref 1–2.5)
ALP BLD-CCNC: 57 U/L (ref 40–129)
ALT SERPL-CCNC: 30 U/L (ref 5–41)
AMYLASE: 45 U/L (ref 28–100)
ANION GAP SERPL CALCULATED.3IONS-SCNC: 12 MMOL/L (ref 9–17)
AST SERPL-CCNC: 26 U/L
BASOPHILS # BLD: 1 % (ref 0–2)
BASOPHILS ABSOLUTE: 0.09 K/UL (ref 0–0.2)
BILIRUB SERPL-MCNC: 0.6 MG/DL (ref 0.3–1.2)
BILIRUBIN DIRECT: <0.08 MG/DL
BILIRUBIN, INDIRECT: NORMAL MG/DL (ref 0–1)
BUN BLDV-MCNC: 24 MG/DL (ref 6–20)
BUN/CREAT BLD: 23 (ref 9–20)
CALCIUM SERPL-MCNC: 9.6 MG/DL (ref 8.6–10.4)
CHLORIDE BLD-SCNC: 94 MMOL/L (ref 98–107)
CO2: 25 MMOL/L (ref 20–31)
CREAT SERPL-MCNC: 1.05 MG/DL (ref 0.7–1.2)
DIFFERENTIAL TYPE: ABNORMAL
EKG ATRIAL RATE: 83 BPM
EKG P AXIS: 52 DEGREES
EKG P-R INTERVAL: 168 MS
EKG Q-T INTERVAL: 368 MS
EKG QRS DURATION: 102 MS
EKG QTC CALCULATION (BAZETT): 432 MS
EKG R AXIS: -11 DEGREES
EKG T AXIS: 25 DEGREES
EKG VENTRICULAR RATE: 83 BPM
EOSINOPHILS RELATIVE PERCENT: 1 % (ref 1–4)
GFR AFRICAN AMERICAN: >60 ML/MIN
GFR NON-AFRICAN AMERICAN: >60 ML/MIN
GFR SERPL CREATININE-BSD FRML MDRD: ABNORMAL ML/MIN/{1.73_M2}
GFR SERPL CREATININE-BSD FRML MDRD: ABNORMAL ML/MIN/{1.73_M2}
GLOBULIN: NORMAL G/DL (ref 1.5–3.8)
GLUCOSE BLD-MCNC: 167 MG/DL (ref 70–99)
HCT VFR BLD CALC: 45.5 % (ref 40.7–50.3)
HEMOGLOBIN: 14.6 G/DL (ref 13–17)
IMMATURE GRANULOCYTES: 0 %
LIPASE: 25 U/L (ref 13–60)
LYMPHOCYTES # BLD: 16 % (ref 24–43)
MCH RBC QN AUTO: 27.5 PG (ref 25.2–33.5)
MCHC RBC AUTO-ENTMCNC: 32.1 G/DL (ref 28.4–34.8)
MCV RBC AUTO: 85.7 FL (ref 82.6–102.9)
MONOCYTES # BLD: 10 % (ref 3–12)
NRBC AUTOMATED: 0 PER 100 WBC
PDW BLD-RTO: 13.2 % (ref 11.8–14.4)
PLATELET # BLD: 236 K/UL (ref 138–453)
PLATELET ESTIMATE: ABNORMAL
PMV BLD AUTO: 10.9 FL (ref 8.1–13.5)
POTASSIUM SERPL-SCNC: 3.9 MMOL/L (ref 3.7–5.3)
RBC # BLD: 5.31 M/UL (ref 4.21–5.77)
RBC # BLD: ABNORMAL 10*6/UL
SEG NEUTROPHILS: 72 % (ref 36–65)
SEGMENTED NEUTROPHILS ABSOLUTE COUNT: 10.04 K/UL (ref 1.5–8.1)
SODIUM BLD-SCNC: 131 MMOL/L (ref 135–144)
TOTAL PROTEIN: 7.9 G/DL (ref 6.4–8.3)
TROPONIN INTERP: NORMAL
TROPONIN T: NORMAL NG/ML
TROPONIN, HIGH SENSITIVITY: 7 NG/L (ref 0–22)
WBC # BLD: 13.9 K/UL (ref 3.5–11.3)
WBC # BLD: ABNORMAL 10*3/UL

## 2021-02-04 PROCEDURE — 84484 ASSAY OF TROPONIN QUANT: CPT

## 2021-02-04 PROCEDURE — 82150 ASSAY OF AMYLASE: CPT

## 2021-02-04 PROCEDURE — 2500000003 HC RX 250 WO HCPCS: Performed by: EMERGENCY MEDICINE

## 2021-02-04 PROCEDURE — 80076 HEPATIC FUNCTION PANEL: CPT

## 2021-02-04 PROCEDURE — 85025 COMPLETE CBC W/AUTO DIFF WBC: CPT

## 2021-02-04 PROCEDURE — 99284 EMERGENCY DEPT VISIT MOD MDM: CPT

## 2021-02-04 PROCEDURE — 83690 ASSAY OF LIPASE: CPT

## 2021-02-04 PROCEDURE — 93005 ELECTROCARDIOGRAM TRACING: CPT | Performed by: EMERGENCY MEDICINE

## 2021-02-04 PROCEDURE — 80048 BASIC METABOLIC PNL TOTAL CA: CPT

## 2021-02-04 PROCEDURE — 96374 THER/PROPH/DIAG INJ IV PUSH: CPT

## 2021-02-04 PROCEDURE — 93010 ELECTROCARDIOGRAM REPORT: CPT | Performed by: INTERNAL MEDICINE

## 2021-02-04 PROCEDURE — 71045 X-RAY EXAM CHEST 1 VIEW: CPT

## 2021-02-04 RX ADMIN — FAMOTIDINE 20 MG: 10 INJECTION, SOLUTION INTRAVENOUS at 23:44

## 2021-02-05 ENCOUNTER — TELEPHONE (OUTPATIENT)
Dept: FAMILY MEDICINE CLINIC | Age: 41
End: 2021-02-05

## 2021-02-05 VITALS
OXYGEN SATURATION: 94 % | DIASTOLIC BLOOD PRESSURE: 61 MMHG | SYSTOLIC BLOOD PRESSURE: 123 MMHG | TEMPERATURE: 97.1 F | WEIGHT: 315 LBS | BODY MASS INDEX: 36.45 KG/M2 | HEART RATE: 78 BPM | RESPIRATION RATE: 13 BRPM | HEIGHT: 78 IN

## 2021-02-05 LAB
TROPONIN INTERP: NORMAL
TROPONIN T: NORMAL NG/ML
TROPONIN, HIGH SENSITIVITY: 7 NG/L (ref 0–22)

## 2021-02-05 PROCEDURE — 6370000000 HC RX 637 (ALT 250 FOR IP): Performed by: EMERGENCY MEDICINE

## 2021-02-05 PROCEDURE — 84484 ASSAY OF TROPONIN QUANT: CPT

## 2021-02-05 PROCEDURE — 36415 COLL VENOUS BLD VENIPUNCTURE: CPT

## 2021-02-05 RX ADMIN — LIDOCAINE HYDROCHLORIDE: 20 SOLUTION ORAL; TOPICAL at 00:52

## 2021-02-05 NOTE — TELEPHONE ENCOUNTER
[facility]) ED Follow up Call    Reason for ED visit:  BLOOD PRESSURE         Hi Bia Clay , this is Alessandro Falcon from Dr. Sarah Askew office, just calling to see how you are doing after your recent ED visit. Did you receive discharge instructions? Yes  Do you understand the discharge instructions? Yes  Did the ED give you any new prescriptions? No:   Were you able to fill your prescriptions? No: NONE GIVEN      Do you have one of our red, yellow and green  Zone sheets that help you to determine when you should go to the ED? Not Applicable      Do you need or want to make a follow up appt with your PCP? Yes    Do you have any further needs in the home i.e. Equipment?   Not Applicable        FU appts/Provider:    Future Appointments   Date Time Provider Sejal Romero   2/18/2021  3:30 PM STEPHON Rojas NP [facility] [address]   4/6/2021  3:00 [facility]

## 2021-02-05 NOTE — ED PROVIDER NOTES
[facility] ED  EMERGENCY DEPARTMENT ENCOUNTER      Pt Name: Priscilla Ornelas  MRN: [id number]  [place] 1980  Date of evaluation: 2/4/2021  Provider: Jazmin Oates MD    CHIEF COMPLAINT       Chief Complaint   Patient presents with    Hypertension     patient reports high bp readings at home today, patient complains of chest discomfort    Chest Pain         HISTORY OF PRESENT ILLNESS   (Location/Symptom, Timing/Onset, Context/Setting, Quality, Duration, Modifying Factors, Severity)  Note limiting factors. Priscilla Ornelas is a 36 y.o. male who presents to the emergency department      37 yo male presented to the ED for evaluation of high blood pressure readings at home and epigastric discomfort. No headaches. Patient has been taking his home medications as prescribed. History of  Insulin requiring  DM, HTN and GERD. Epigastric  Discomfort for a few hours. No SOB, cough  Focal neurological concerns. Nursing Notes were reviewed. REVIEW OF SYSTEMS    (2-9 systems for level 4, 10 or more for level 5)     Review of Systems   All other systems reviewed and are negative. Except as noted above the remainder of the review of systems was reviewed and negative.        PAST MEDICAL HISTORY     Past Medical History:   Diagnosis Date    Anxiety     Chronic left-sided low back pain with left-sided sciatica 11/14/2018    Depression     Gastroesophageal reflux disease without esophagitis 10/24/2018    Headache     Hyperlipidemia     Hypertension     Intermittent explosive disorder in adult 10/24/2017    Morbid obesity with BMI of 45.0-49.9, adult ([facility] [address] 10/10/2017    Type 2 diabetes mellitus with hyperglycemia, without long-term current use of insulin ([facility] [address] 3/9/2020         SURGICAL HISTORY       Past Surgical History:   Procedure Laterality Date    TONSILLECTOMY           CURRENT MEDICATIONS       Discharge Medication List as of 2/5/2021  1:23 AM      CONTINUE these medications which have NOT CHANGED    Details   atenolol (TENORMIN) 100 MG tablet Take 1 tablet by mouth daily Dose increased 2/2/2021.for HTN and anxiety. Keep BP bellow 1135/85 and above 115/65, and pulse 56-80, Disp-90 tablet, R-3Normal      sertraline (ZOLOFT) 50 MG tablet Take 0.5 tablets by mouth daily for 8 days, THEN 1 tablet daily for 7 days, THEN 2 tablets daily for 14 days. Call for refill., Disp-39 tablet, R-0Normal      famotidine (PEPCID) 20 MG tablet Take 1 tablet by mouth 2 times daily, Disp-60 tablet, R-0Normal      metFORMIN (GLUCOPHAGE) 1000 MG tablet Take 1 tablet by mouth 2 times daily (with meals), Disp-60 tablet,R-5Normal      insulin glargine (LANTUS SOLOSTAR) 100 UNIT/ML injection pen Inject 10 Units into the skin every morning (before breakfast) Start 10 units. , Disp-5 pen,R-3Normal      atorvastatin (LIPITOR) 10 MG tablet Take 1 tablet by mouth nightly, Disp-90 tablet,R-3Normal      aspirin EC 81 MG EC tablet Take 1 tablet by mouth daily, Disp-90 tablet,R-3Normal      lisinopril (PRINIVIL;ZESTRIL) 40 MG tablet Take 1 tablet by mouth daily, Disp-90 tablet,R-3Normal      hydroCHLOROthiazide (HYDRODIURIL) 50 MG tablet Take 1 tablet by mouth daily, Disp-90 tablet,R-3Normal      potassium chloride (KLOR-CON M) 10 MEQ extended release tablet Take 1 tablet by mouth daily, Disp-90 tablet,R-3Normal      risperiDONE (RISPERDAL) 1 MG tablet Take 1 tablet by mouth 2 times daily TAKE ONE TABLET BY MOUTH TWICE A DAY, Disp-60 tablet,R-5Normal      UNIFINE PENTIPS 31G X 6 MM MISC DAILY Starting Tue 12/1/2020, Disp-100 each,R-3,SARAH, Normal      blood glucose monitor strips Test 2-3 times a day & as needed for symptoms of irregular blood glucose., Disp-100 strip,R-5, Normal      Alcohol Swabs (ALCOHOL PREP) PADS Disp-100 each,R-5, NormalUse as directed      Lancets MISC 2 TIMES DAILY Starting Tue 12/1/2020, Disp-300 each,R-1, Normal      Blood Pressure Monitor KIT Disp-1 kit, R-1, PrintUse as directed.       glucose monitoring kit (FREEYLE) monitoring kit Disp-1 kit, R-0, NormalUse as directed             ALLERGIES     Buspar [buspirone]    FAMILY HISTORY       Family History   Problem Relation Age of Onset    Diabetes Mother     Bipolar Disorder Mother     Diabetes Father     Diabetes Maternal Grandmother     Diabetes Maternal Grandfather     Cancer Maternal Grandfather     Alzheimer's Disease Maternal Grandfather     Diabetes Paternal Grandmother     Heart Disease Paternal Grandmother 54        Approximate age   [place] Diabetes Paternal Grandfather           SOCIAL HISTORY       Social History     Socioeconomic History    Marital status: Single     Spouse name: Not on file    Number of children: Not on file    Years of education: Not on file    Highest education level: Not on file   Occupational History    Not on file   Social Needs    Financial resource strain: Not on file    Food insecurity     Worry: Not on file     Inability: Not on file    Transportation needs     Medical: Not on file     Non-medical: Not on file   Tobacco Use    Smoking status: Former Smoker     Packs/day: 1.25     Years: 23.00     Pack years: 28.75     Types: Cigarettes     Quit date: 3/26/2017     Years since quitting: 3.9    Smokeless tobacco: Never Used    Tobacco comment: Quit smoking cold turkey   Substance and Sexual Activity    Alcohol use: No     Alcohol/week: 0.0 standard drinks    Drug use: No    Sexual activity: Yes     Partners: Female   Lifestyle    Physical activity     Days per week: Not on file     Minutes per session: Not on file    Stress: Not on file   Relationships    Social connections     Talks on phone: Not on file     Gets together: Not on file     Attends Anabaptist service: Not on file     Active member of club or organization: Not on file     Attends meetings of clubs or organizations: Not on file     Relationship status: Not on file    Intimate partner violence     Fear of current or ex partner: Not on file All other components within normal limits   CBC WITH AUTO DIFFERENTIAL - Abnormal; Notable for the following components:    WBC 13.9 (*)     Seg Neutrophils 72 (*)     Lymphocytes 16 (*)     Segs Absolute 10.04 (*)     Absolute Mono # 1.38 (*)     All other components within normal limits   TROPONIN   HEPATIC FUNCTION PANEL   LIPASE   AMYLASE   TROPONIN       All other labs were within normal range or not returned as of this dictation. EMERGENCY DEPARTMENT COURSE and DIFFERENTIAL DIAGNOSIS/MDM:   Vitals:    Vitals:    02/05/21 0015 02/05/21 0030 02/05/21 0100 02/05/21 0115   BP: (!) 159/79 (!) 153/81 (!) 127/54 123/61   Pulse: 84 80 79 78   Resp: 16 18 15 13   Temp:       TempSrc:       SpO2: 90% 96%  94%   Weight:       Height:               MDM  Number of Diagnoses or Management Options  Atypical chest pain  Gastroesophageal reflux disease, unspecified whether esophagitis present  Hypertension, unspecified type  Diagnosis management comments: Blood Pressure normalized on recheck. Epigastric pain improved with pepcid and resolved with GI cocktail. EKG, lab, and chest xray  Reviewed and without acute findings. Stable for dicharge home. ED return and follow up instructions discussed          REASSESSMENT          CRITICAL CARE TIME   Total Critical Care time was  minutes, excluding separately reportable procedures. There was a high probability of clinically significant/life threatening deterioration in the patient's condition which required my urgent intervention. CONSULTS:  None    PROCEDURES:  Unless otherwise noted below, none     Procedures        FINAL IMPRESSION      1. Atypical chest pain    2. Gastroesophageal reflux disease, unspecified whether esophagitis present    3.  Hypertension, unspecified type          DISPOSITION/PLAN   DISPOSITION Decision To Discharge 02/05/2021 01:21:51 AM      PATIENT REFERRED TO:  Olegario Powell MD  [address] [place] [id number]  [phone]      Follow up in 3 to 5 days for recheck      DISCHARGE MEDICATIONS:  Discharge Medication List as of 2/5/2021  1:23 AM        Controlled Substances Monitoring:     RX Monitoring 9/20/2017   Attestation The Prescription Monitoring Report for this patient was reviewed today.        (Please note that portions of this note were completed with a voice recognition program.  Efforts were made to edit the dictations but occasionally words are mis-transcribed.)    Darnell Fernandez MD (electronically signed)  Attending Emergency Physician             Darnell Fernandez MD  02/18/21 [id number]

## 2021-02-17 ASSESSMENT — PATIENT HEALTH QUESTIONNAIRE - PHQ9
2. FEELING DOWN, DEPRESSED OR HOPELESS: 2
SUM OF ALL RESPONSES TO PHQ QUESTIONS 1-9: 19
3. TROUBLE FALLING OR STAYING ASLEEP: 3
5. POOR APPETITE OR OVEREATING: 3
8. MOVING OR SPEAKING SO SLOWLY THAT OTHER PEOPLE COULD HAVE NOTICED. OR THE OPPOSITE, BEING SO FIGETY OR RESTLESS THAT YOU HAVE BEEN MOVING AROUND A LOT MORE THAN USUAL: 3
7. TROUBLE CONCENTRATING ON THINGS, SUCH AS READING THE NEWSPAPER OR WATCHING TELEVISION: 1
6. FEELING BAD ABOUT YOURSELF - OR THAT YOU ARE A FAILURE OR HAVE LET YOURSELF OR YOUR FAMILY DOWN: 2
4. FEELING TIRED OR HAVING LITTLE ENERGY: 3

## 2021-02-17 ASSESSMENT — COLUMBIA-SUICIDE SEVERITY RATING SCALE - C-SSRS
6. HAVE YOU EVER DONE ANYTHING, STARTED TO DO ANYTHING, OR PREPARED TO DO ANYTHING TO END YOUR LIFE?: NO
2. HAVE YOU ACTUALLY HAD ANY THOUGHTS OF KILLING YOURSELF?: NO

## 2021-02-17 NOTE — PROGRESS NOTES
Visit Information    Have you changed or started any medications since your last visit including any over-the-counter medicines, vitamins, or herbal medicines? no   Have you stopped taking any of your medications? Is so, why? -  no  Are you having any side effects from any of your medications? - no    Have you seen any other physician or provider since your last visit?  no   Have you had any other diagnostic tests since your last visit? yes - XR CHEST   Have you been seen in the emergency room and/or had an admission in a hospital since we last saw you?  yes - 02/04/2021   Have you had your routine dental cleaning in the past 6 months?  no     Do you have an active MyChart account? If no, what is the barrier?   Yes    Patient Care Team:  Marko Arrington MD as PCP - General (Family Medicine)  Marko Arrington MD as PCP - [facility] Provider  TITO Garrett as  (Licensed Clinical )    Medical History Review  Past Medical, Family, and Social History reviewed and does contribute to the patient presenting condition    Health Maintenance   Topic Date Due    Hepatitis C screen  1980    Diabetic foot exam  08/06/1990    Diabetic retinal exam  08/06/1990    Hepatitis B vaccine (1 of 3 - Risk 3-dose series) 08/06/1999    Flu vaccine (1) 06/30/2021 (Originally 9/1/2020)    Diabetic microalbuminuria test  03/09/2021    A1C test (Diabetic or Prediabetic)  12/02/2021    Lipid screen  12/02/2021    Potassium monitoring  02/04/2022    Creatinine monitoring  02/04/2022    DTaP/Tdap/Td vaccine (2 - Td) 12/28/2026    Pneumococcal 0-64 years Vaccine  Completed    HIV screen  Completed    Hepatitis A vaccine  Aged Out    Hib vaccine  Aged Out    Meningococcal (ACWY) vaccine  Aged Out    Varicella vaccine  Discontinued

## 2021-02-18 ENCOUNTER — TELEMEDICINE (OUTPATIENT)
Dept: PSYCHIATRY | Age: 41
End: 2021-02-18
Payer: COMMERCIAL

## 2021-02-18 ENCOUNTER — TELEMEDICINE (OUTPATIENT)
Dept: FAMILY MEDICINE CLINIC | Age: 41
End: 2021-02-18
Payer: COMMERCIAL

## 2021-02-18 DIAGNOSIS — F33.2 SEVERE EPISODE OF RECURRENT MAJOR DEPRESSIVE DISORDER, WITHOUT PSYCHOTIC FEATURES (HCC): ICD-10-CM

## 2021-02-18 DIAGNOSIS — R06.09 DOE (DYSPNEA ON EXERTION): Primary | ICD-10-CM

## 2021-02-18 DIAGNOSIS — I10 ESSENTIAL HYPERTENSION: ICD-10-CM

## 2021-02-18 DIAGNOSIS — Z91.49 PSYCHOLOGICAL TRAUMA HISTORY: ICD-10-CM

## 2021-02-18 DIAGNOSIS — R63.4 UNINTENDED WEIGHT LOSS: ICD-10-CM

## 2021-02-18 DIAGNOSIS — F33.9 MAJOR DEPRESSIVE DISORDER, RECURRENT EPISODE WITH ANXIOUS DISTRESS (HCC): Primary | ICD-10-CM

## 2021-02-18 PROCEDURE — 99214 OFFICE O/P EST MOD 30 MIN: CPT | Performed by: FAMILY MEDICINE

## 2021-02-18 PROCEDURE — G8427 DOCREV CUR MEDS BY ELIG CLIN: HCPCS | Performed by: FAMILY MEDICINE

## 2021-02-18 PROCEDURE — 90792 PSYCH DIAG EVAL W/MED SRVCS: CPT | Performed by: NURSE PRACTITIONER

## 2021-02-18 RX ORDER — HYDROXYZINE HYDROCHLORIDE 25 MG/1
25 TABLET, FILM COATED ORAL 3 TIMES DAILY PRN
Qty: 90 TABLET | Refills: 0 | Status: SHIPPED | OUTPATIENT
Start: 2021-02-18

## 2021-02-18 RX ORDER — SERTRALINE HYDROCHLORIDE 100 MG/1
100 TABLET, FILM COATED ORAL DAILY
Qty: 30 TABLET | Refills: 0 | Status: SHIPPED | OUTPATIENT
Start: 2021-02-18 | End: 2021-03-18

## 2021-02-18 RX ORDER — PRAZOSIN HYDROCHLORIDE 1 MG/1
1 CAPSULE ORAL NIGHTLY
Qty: 30 CAPSULE | Refills: 0 | Status: SHIPPED | OUTPATIENT
Start: 2021-02-18 | End: 2021-03-18 | Stop reason: SDUPTHER

## 2021-02-18 RX ORDER — TRAZODONE HYDROCHLORIDE 50 MG/1
25 TABLET ORAL NIGHTLY PRN
Qty: 15 TABLET | Refills: 0 | Status: SHIPPED
Start: 2021-02-18 | End: 2022-01-25 | Stop reason: ALTCHOICE

## 2021-02-18 ASSESSMENT — ENCOUNTER SYMPTOMS
ABDOMINAL DISTENTION: 0
DIARRHEA: 0
VOMITING: 0
NAUSEA: 0
ABDOMINAL PAIN: 0
CONSTIPATION: 0
COUGH: 0
CHEST TIGHTNESS: 0
WHEEZING: 0
SHORTNESS OF BREATH: 1

## 2021-02-18 NOTE — PROGRESS NOTES
Behavioral Health Consultation  Jonny Paula, MSN, APRN-CNP, PMHNP-BC  2/18/2021, 4:02 PM      Time spent with Patient:  60 minutes  This was a telehealth visit. Patient Location: Home.    Provider Location: Home office in [place], [place]  This virtual visit was conducted via interactive/real-time audio/video      Chief Complaint:anxiety and depression  Referring Provider:Dr. Landry Grief: Patient complains of a multi-year history of anxiety and depression. Sami Gleason states that he was working  He reports anhedonia, depressed mood, tearfulness, feelings of hopelessness, feelings of worthlessness/excessive guilt, insomnia, fatigue, changes in appetite/weight, decreased libido, difficulty concentrating, irritability, excessive worry, restlessness, panic attacks, suicidal thoughts or behavior and impaired memory. He reports difficulty falling asleep, difficulty staying asleep, restless unsatisfying sleep and early morning waking on most nights of the week. Pt reports taking sertraline, risperdal. Pt Symptoms/signs of lucille: none. He denies hallucinations. Symptoms have been gradually worsening with time. External stressors: illness or family illness. Pt reports excessive worry or anxiety, difficulty controlling the worry, restlessness; feeling keyed up or on edge, easily fatigued, difficulty concentrating, irritability, sleep disturbance:  difficulty falling asleep, difficulty staying asleep, restless unsatisfying sleep and early morning waking, for at least 6 months, anxiety/worry about a number of events/activities, causing significant distress in important areas of function, is not due to physiological effects of substances or medical condition and does not occur exclusively during a mood disorder or psychotic disorder.  Patient exposed to traumatic event - actual or threatened death, serious injury, or sexual violence, intrusive symotoms:  images, thoughts, or perceptions of the event, intense psychological distress when reminded of the event and intense physiological reactivity when reminded of the event, avoidance symptoms: avoids thoughts, feelings, conversations associated with the trauma and avoids external reminders :activities, people, places that arouse recollections of the trauma, negative alterations in cognition and mood:  persistent negative beliefs about oneself, others, or the world, negative emotional state, exhibits markedly diminished interest/participation in significant activities and feels detached/estranged from others, arousal symptoms : irritability or outbursts of anger, self-destructive behavior, poor concentration, hyper-vigilance, exaggerated startle resopnse and difficulty falling or staying asleep, duration of disturbances is greater than one month and significant distress/impairment in functioning. Pt denies any current exercise. Sarah Mai states that he eats 2-3 meals a day, and eating healthy. Social:dating same woman for seven years, no kids. Has two kids from previous relationship. Not working currently. Relies on girlfriend for support, hs grad. Past Psychiatric history:   The patient has a history of  depression and anxiety disorder. Current treatment includes Anti-depressant: sertraline and Anti-psychotic: risperidone. Patient denies side effects from current treatment. Previous treatment has included: Zoloft- just started taking this feels it is working and Wellbutrin- can't remember much about this one. Family Mental Health history:   Pertinent family history: mother has some mental health issues. MSE:    Appearance: alert, cooperative  Attention:Intact  Appetite: normal  Ambulation: gait unable to assess.   Sleep disturbance: Yes  Loss of pleasure: Yes  Speech: spontaneous, normal rate, normal volume and well articulated  Mood: Depressed  Affect: depressed affect  Thought Content: intact  Insight: Fair  Judgment: Intact  Memory: Intact long-term and Intact short-term  Suicide Assessment: no suicidal ideation  Homicide Assessment: denies current homicidal ideation, plan and intent      History:      Review of Systems:   Constitutional: negative  HENT: negative  Eyes: positive for eyeglasses  Respiratory: negative  Cardiovascular: negative  Gastrointestinal: negative  Genitourinary: negative  Musculoskeletal: negative  Skin:negative Neurological:negative  Endo/Heme/Allergies:positive for buspar allergy        Current Outpatient Medications:     sertraline (ZOLOFT) 100 MG tablet, Take 1 tablet by mouth daily Call for refill, Disp: 30 tablet, Rfl: 0    prazosin (MINIPRESS) 1 MG capsule, Take 1 capsule by mouth nightly, Disp: 30 capsule, Rfl: 0    hydrOXYzine (ATARAX) 25 MG tablet, Take 1 tablet by mouth 3 times daily as needed for Anxiety, Disp: 90 tablet, Rfl: 0    traZODone (DESYREL) 50 MG tablet, Take 0.5 tablets by mouth nightly as needed for Sleep, Disp: 15 tablet, Rfl: 0    atenolol (TENORMIN) 100 MG tablet, Take 1 tablet by mouth daily Dose increased 2/2/2021.for HTN and anxiety. Keep BP bellow 1135/85 and above 115/65, and pulse 56-80, Disp: 90 tablet, Rfl: 3    famotidine (PEPCID) 20 MG tablet, Take 1 tablet by mouth 2 times daily, Disp: 60 tablet, Rfl: 0    metFORMIN (GLUCOPHAGE) 1000 MG tablet, Take 1 tablet by mouth 2 times daily (with meals), Disp: 60 tablet, Rfl: 5    insulin glargine (LANTUS SOLOSTAR) 100 UNIT/ML injection pen, Inject 10 Units into the skin every morning (before breakfast) Start 10 units. , Disp: 5 pen, Rfl: 3    atorvastatin (LIPITOR) 10 MG tablet, Take 1 tablet by mouth nightly, Disp: 90 tablet, Rfl: 3    aspirin EC 81 MG EC tablet, Take 1 tablet by mouth daily, Disp: 90 tablet, Rfl: 3    lisinopril (PRINIVIL;ZESTRIL) 40 MG tablet, Take 1 tablet by mouth daily, Disp: 90 tablet, Rfl: 3    hydroCHLOROthiazide (HYDRODIURIL) 50 MG tablet, Take 1 tablet by mouth daily, Disp: 90 tablet, Rfl: 3    potassium chloride (KLOR-CON M) 10 MEQ extended release tablet, Take 1 tablet by mouth daily, Disp: 90 tablet, Rfl: 3    risperiDONE (RISPERDAL) 1 MG tablet, Take 1 tablet by mouth 2 times daily TAKE ONE TABLET BY MOUTH TWICE A DAY, Disp: 60 tablet, Rfl: 5    UNIFINE PENTIPS 31G X 6 MM MISC, Inject 1 each into the skin daily, Disp: 100 each, Rfl: 3  Physical activity     Days per week: Not on file     Minutes per session: Not on file    Stress: Not on file   Relationships    Social connections     Talks on phone: Not on file     Gets together: Not on file     Attends Confucianism service: Not on file     Active member of club or organization: Not on file     Attends meetings of clubs or organizations: Not on file     Relationship status: Not on file    Intimate partner violence     Fear of current or ex partner: Not on file     Emotionally abused: Not on file     Physically abused: Not on file     Forced sexual activity: Not on file   Other Topics Concern    Not on file   Social History Narrative    ** Merged History Encounter **            TOBACCO: Jeromy Chambers  reports that he quit smoking about 3 years ago. His smoking use included cigarettes. He has a 28.75 pack-year smoking history. He has never used smokeless tobacco.  ETOH: Jeromy Chambers  reports no history of alcohol use. Past Medical History:   Diagnosis Date    Anxiety     Chronic left-sided low back pain with left-sided sciatica 11/14/2018    Depression     Gastroesophageal reflux disease without esophagitis 10/24/2018    Headache     Hyperlipidemia     Hypertension     Intermittent explosive disorder in adult 10/24/2017    Morbid obesity with BMI of 45.0-49.9, adult ([facility] [address] 10/10/2017    Type 2 diabetes mellitus with hyperglycemia, without long-term current use of insulin ([facility] [address] 6/4/2467      Metabolic monitoring is being done by PCP.    Family History   Problem Relation Age of Onset    Diabetes Mother     Bipolar Disorder Mother     Diabetes Father     Diabetes Maternal Grandmother     Diabetes Maternal Grandfather     Cancer Maternal Grandfather     Alzheimer's Disease Maternal Grandfather     Diabetes Paternal Grandmother     Heart Disease Paternal Grandmother 54        Approximate age   [place]rev [place] Diabetes Paternal Grandfather        Last Labs:   Lab Results   Component Value Date LABA1C 6.5 (H) 12/02/2020     Lab Results   Component Value Date     12/02/2020      Lab Results   Component Value Date    WBC 13.9 (H) 02/04/2021    HGB 14.6 02/04/2021    HCT 45.5 02/04/2021    MCV 85.7 02/04/2021     02/04/2021    LYMPHOPCT 16 (L) 02/04/2021    RBC 5.31 02/04/2021    MCH 27.5 02/04/2021    MCHC 32.1 02/04/2021    RDW 13.2 02/04/2021          Lab Results   Component Value Date     (L) 02/04/2021    K 3.9 02/04/2021    CL 94 (L) 02/04/2021    CO2 25 02/04/2021    BUN 24 (H) 02/04/2021    CREATININE 1.05 02/04/2021    GLUCOSE 167 (H) 02/04/2021    CALCIUM 9.6 02/04/2021    PROT 7.9 02/04/2021    LABALBU 4.7 02/04/2021    BILITOT 0.60 02/04/2021    ALKPHOS 57 02/04/2021    AST 26 02/04/2021    ALT 30 02/04/2021    LABGLOM >60 02/04/2021    GFRAA >60 02/04/2021    GLOB NOT REPORTED 02/04/2021      . last      Diagnosis:      1. Major depressive disorder, recurrent episode with anxious distress ([facility] [address]    2. Psychological trauma history        Plan:    Discussed adding trazodone and prazosin, and hydroxyzine. Discussed risks and benefits of medications, as well as need for yearly lab work. Follow up with [facility] for continued therapy. 60 minutes spent face to face with greater than 50% was spent coordinating care, and therapy. No orders of the defined types were placed in this encounter.      Orders Placed This Encounter   Medications    sertraline (ZOLOFT) 100 MG tablet     Sig: Take 1 tablet by mouth daily Call for refill     Dispense:  30 tablet     Refill:  0    prazosin (MINIPRESS) 1 MG capsule     Sig: Take 1 capsule by mouth nightly     Dispense:  30 capsule     Refill:  0    hydrOXYzine (ATARAX) 25 MG tablet     Sig: Take 1 tablet by mouth 3 times daily as needed for Anxiety     Dispense:  90 tablet     Refill:  0    traZODone (DESYREL) 50 MG tablet     Sig: Take 0.5 tablets by mouth nightly as needed for Sleep     Dispense:  15 tablet     Refill:  0 Pt interventions:    Discussed importance of medication adherence, Discussed risks, benefits, side effects of medication and need for follow up treatment, Discussed benefits of referral for specialty care and Discussed self-care (sleep, nutrition, rewarding activities, social support, exercise)

## 2021-02-18 NOTE — PROGRESS NOTES
2021    TELEHEALTH EVALUATION -- Audio/Visual (During [facility] public health emergency)      Melissa Diaz (:  1980) is a 36 y.o. male,Established patient, here for evaluation of the following chief complaint(s): ED Follow-up (FELLING BETTER)        ASSESSMENT/PLAN:    1. JORDAN (dyspnea on exertion)  Failing to change as expected. Needs full cardiac work-up due to multiple risk factors  -     JAZ - Cortney Bennett MD, Cardiology, [place]  2. Essential hypertension  Well controlled. Continue current treatment. Discussed low salt diet and BP and pulse monitoring daily    3. Severe episode of recurrent major depressive disorder, without psychotic features ([facility] [address]  worsening    PHQ Scores 2021 2021 2020 10/18/2018 3/29/2018 10/10/2017   PHQ2 Score 4 3 4 6 4 5   PHQ9 Score 19 17 23 27 21 22     Has appointment with psychiatrist today, was advised to reschedule the appointment with [facility] psychologist  Patient has good family support, his girlfriend  Continue current psychiatric medications    4. Unintended weight loss  He declines referral to GI at this time, he will let me know if he continues to lose weight  Patient was advised to do his blood work within a week fasting, orders are in epic  The patient verbalizes understanding and agrees with the plan. Levi Deras received counseling on the following healthy behaviors: nutrition, exercise, medication adherence, tobacco cessation and weight loss    Reviewed prior labs and health maintenance  Discussed use, benefit, and side effects of prescribed medications. Barriers to medication compliance addressed. Patient given educational materials - see patient instructions  All patient questions answered. Patient voiced understanding. The patient's past medical,surgical, social, and family history as well as his current medications and allergies were reviewed as documented in today's encounter. Medications, labs, diagnostic studies, consultations and follow-up as documented in this encounter. Return for KEEP APPT. Data Unavailable      SUBJECTIVE/OBJECTIVE:  Adan Newsome (:  1980) has requested an audio/video evaluation for the following concern(s):ED Follow-up (FELLING BETTER)      Patient reports she was seen in ED on 2021 at [facility] due to   \"panic attack\", however the records mentioned a typical chest pain, high blood pressure readings    EKG was within normal limits   CXR within normal limits   Sodium low, 131, acute kidney injury with BUN 24, increased WBC count 13.9. Patient says his Father passed away   having severe anxiety attacks which can last for awhile    The records from emergency room mention he had atypical chest pain,but he says he had stomach pain and felt nauseated, but not anymore    Missed appointment with [employer] [facility]ie for counseling, will call to reschedule  Will see Neena Cain today  Depression is worsening    PHQ-2 Over the past 2 weeks, how often have you been bothered by any of the following problems? Little interest or pleasure in doing things: More than half the days  Feeling down, depressed, or hopeless: More than half the days  PHQ-2 Score: 4  PHQ-9 Over the past 2 weeks, how often have you been bothered by any of the following problems? Trouble falling or staying asleep, or sleeping too much: Nearly every day  Feeling tired or having little energy: Nearly every day  Poor appetite or overeating: Nearly every day  Feeling bad about yourself - or that you are a failure or have let yourself or your family down: More than half the days  Trouble concentrating on things, such as reading the newspaper or watching television: Several days  Moving or speaking so slowly that other people could have noticed.  Or the opposite - being so fidgety or restless that you have been moving around a lot more than usual: Nearly every day Thoughts that you would be better off dead, or of hurting yourself in some way: Not at all  If you checked off any problems, how difficult have these problems made it for you to do your work, take care of things at home, or get along with other people?: Somewhat difficult  PHQ-9 Total Score: 19    He says he would not harm himself in any way. Denies suicidal ideation, plan or intent. AMB C-SSRS Suicide Screening     1) Within the past month, have you wished you were dead or wished you could go to sleep and not wake up? NO NO   2) Have you actually had any thoughts of killing yourself? NO NO   6) Have you ever done anything, started to do anything, or prepared to do anything to end your life? NO NO         PHQ Scores 2/17/2021 2/2/2021 12/1/2020 10/18/2018 3/29/2018 10/10/2017   PHQ2 Score 4 3 4 6 4 5   PHQ9 Score 19 17 23 27 21 22     Patient admits to unintentional weight loss  He says he has LOST 20 LBS, but [facility] [address] [facility]   Patient currently denies nausea, vomiting, abdominal pain, diarrhea or constipation. He declines seeing a GI doctor  Wt Readings from Last 3 Encounters:   02/04/21 (!) 473 lb (214.6 kg)   03/09/20 (!) 550 lb (249.5 kg)   02/10/20 (!) 548 lb (248.6 kg)         Review of Systems   Constitutional: Positive for appetite change (improving), fatigue and unexpected weight change. Negative for activity change, chills, diaphoresis and fever. Respiratory: Positive for shortness of breath. Negative for cough, chest tightness and wheezing. Cardiovascular: Negative for chest pain, palpitations and leg swelling. Gastrointestinal: Negative for abdominal distention, abdominal pain, constipation, diarrhea, nausea and vomiting. Psychiatric/Behavioral: Positive for dysphoric mood. Negative for decreased concentration, hallucinations, self-injury, sleep disturbance and suicidal ideas. The patient is nervous/anxious.           Prior to Visit Medications Medication Sig Taking? Authorizing Provider   atenolol (TENORMIN) 100 MG tablet Take 1 tablet by mouth daily Dose increased 2/2/2021.for HTN and anxiety. Keep BP bellow 1135/85 and above 115/65, and pulse 56-80 Yes Analia Krueger MD   sertraline (ZOLOFT) 50 MG tablet Take 0.5 tablets by mouth daily for 8 days, THEN 1 tablet daily for 7 days, THEN 2 tablets daily for 14 days. Call for refill. Yes Juliana Oates MD   famotidine (PEPCID) 20 MG tablet Take 1 tablet by mouth 2 times daily Yes Juliana Oates MD   metFORMIN (GLUCOPHAGE) 1000 MG tablet Take 1 tablet by mouth 2 times daily (with meals) Yes Juliana Oates MD   insulin glargine (LANTUS SOLOSTAR) 100 UNIT/ML injection pen Inject 10 Units into the skin every morning (before breakfast) Start 10 units. Yes Juliana Oates MD   atorvastatin (LIPITOR) 10 MG tablet Take 1 tablet by mouth nightly Yes Juliana Oates MD   aspirin EC 81 MG EC tablet Take 1 tablet by mouth daily Yes Juliana Oates MD   lisinopril (PRINIVIL;ZESTRIL) 40 MG tablet Take 1 tablet by mouth daily Yes Juliana Oates MD   hydroCHLOROthiazide (HYDRODIURIL) 50 MG tablet Take 1 tablet by mouth daily Yes Juliana Oates MD   potassium chloride (KLOR-CON M) 10 MEQ extended release tablet Take 1 tablet by mouth daily Yes Juliana Oates MD   risperiDONE (RISPERDAL) 1 MG tablet Take 1 tablet by mouth 2 times daily TAKE ONE TABLET BY MOUTH TWICE A DAY Yes Juliana Oates MD   UNIFINE PENTIPS 31G X 6 MM MISC Inject 1 each into the skin daily Yes Juliana Oates MD   blood glucose monitor strips Test 2-3 times a day & as needed for symptoms of irregular blood glucose. Yes Juliana Oates MD   Alcohol Swabs (ALCOHOL PREP) PADS Use as directed Yes Juliana Oates MD   Lancets MISC 1 each by Does not apply route 2 times daily Yes Juliana Oates MD   Blood Pressure Monitor KIT Use as directed.  Yes Malini Negron, APRN - CNP Skin:        [x] No significant exanthematous lesions or discoloration noted on facial skin         [] Abnormal-            Psychiatric:      [x] No Hallucinations       []Mood is normal       [x]Behavior is normal      [x]Judgment is normal      [x]Thought content is normal       [x] Abnormal-anxious and depressed    Other pertinent observable physical exam findings- none    Due to this being a TeleHealth encounter, evaluation of the following organ systems is limited: Vitals/Constitutional/EENT/Resp/CV/GI//MS/Neuro/Skin/Heme-Lymph-Imm. I personally reviewed most recent labs reviewed with the patient and all questions fully answered. Hyponatremia  Leukocytosis  Acute kidney injury  Hyperglycemia  Diabetes greatly improved  Otherwise labs within normal limits        Lab Results   Component Value Date    WBC 13.9 (H) 02/04/2021    HGB 14.6 02/04/2021    HCT 45.5 02/04/2021    MCV 85.7 02/04/2021     02/04/2021       Lab Results   Component Value Date     02/04/2021    K 3.9 02/04/2021    CL 94 02/04/2021    CO2 25 02/04/2021    BUN 24 02/04/2021    CREATININE 1.05 02/04/2021    GLUCOSE 167 02/04/2021    CALCIUM 9.6 02/04/2021      Lab Results   Component Value Date    LABA1C 6.5 (H) 12/02/2020    LABA1C 11.3 03/09/2020    LABA1C 5.8 05/02/2019       Lab Results   Component Value Date    ALT 30 02/04/2021    AST 26 02/04/2021    ALKPHOS 57 02/04/2021    BILITOT 0.60 02/04/2021       Lab Results   Component Value Date    TSH 2.90 12/02/2020       Orders Placed This Encounter   Procedures   Pegyg Turcios MD, Cardiology, [place]     Referral Priority:   Routine     Referral Type:   Eval and Treat     Referral Reason:   Specialty Services Required     Referred to Provider:   Fabi Cummings MD     Requested Specialty:   Internal Medicine Cardiovascular Disease     Number of Visits Requested:   1       There are no discontinued medications.        Future Appointments Date Time Provider Sejal Romero   2/18/2021  3:30 PM Devorah Alvarez APRN - NP [facility] [id number]   4/6/2021  3:00 PM Jerry Page MD [facility]       On this date 02/18/21 I have spent 30 minutes reviewing previous notes, test results and face to face with the patient discussing the diagnosis and importance of compliance with the treatment plan as well as documenting on the day of the visit. Chencho Mendez is a 36 y.o. male being evaluated by a Virtual Visit (video visit) encounter to address concerns as mentioned above. Patient was alone today. Due to this being a TeleHealth encounter (During [facility]-63 public health emergency), evaluation of the following organ systems was limited: Vitals/Constitutional/EENT/Resp/CV/GI//MS/Neuro/Skin/Heme-Lymph-Imm. Pursuant to the emergency declaration under the [address] [place] [address] and the [employer] and [employer] Act, this Virtual Visit was conducted with patient's (and/or legal guardian's) consent, to reduce the patient's risk of exposure to COVID-19 and provide necessary medical care. The patient (and/or legal guardian) has also been advised to contact this office for worsening conditions or problems, and seek emergency medical treatment and/or call 911 if deemed necessary. Patient identification was verified at the start of the visit: Yes      Services were provided through a video synchronous discussion virtually to substitute for in-person clinic visit. Patient was located at home, and provider was located at primary practice location. --Jerry Page MD on 2/18/2021 at 11:05 PM  An electronic signature was used to authenticate this note.

## 2021-03-02 DIAGNOSIS — K21.9 GASTROESOPHAGEAL REFLUX DISEASE WITHOUT ESOPHAGITIS: ICD-10-CM

## 2021-03-02 RX ORDER — FAMOTIDINE 20 MG/1
TABLET, FILM COATED ORAL
Qty: 60 TABLET | Refills: 5 | Status: SHIPPED | OUTPATIENT
Start: 2021-03-02 | End: 2021-08-26

## 2021-03-10 NOTE — TELEPHONE ENCOUNTER
Need med refill on ALPRAZolam 0 5 mg tablet and sent to [facility] on [address]  Pt called requesting appt for HTN related heart palpitations. Transferred to NT - Lattie Likens.

## 2021-03-13 DIAGNOSIS — E11.65 TYPE 2 DIABETES MELLITUS WITH HYPERGLYCEMIA, WITHOUT LONG-TERM CURRENT USE OF INSULIN (HCC): ICD-10-CM

## 2021-03-15 RX ORDER — INSULIN GLARGINE 100 [IU]/ML
INJECTION, SOLUTION SUBCUTANEOUS
Qty: 5 PEN | Refills: 2 | Status: SHIPPED
Start: 2021-03-15 | End: 2021-07-13 | Stop reason: HOSPADM

## 2021-03-15 NOTE — TELEPHONE ENCOUNTER
Please Approve or Refuse.   Send to Pharmacy per Pt's Request:      Next Visit Date:  4/6/2021   Last Visit Date: 2/18/2021    Hemoglobin A1C (%)   Date Value   12/02/2020 6.5 (H)   03/09/2020 11.3   05/02/2019 5.8             ( goal A1C is < 7)   BP Readings from Last 3 Encounters:   02/05/21 123/61   12/16/20 134/86   03/09/20 (!) 138/101          (goal 120/80)  BUN   Date Value Ref Range Status   02/04/2021 24 (H) 6 - 20 mg/dL Final     CREATININE   Date Value Ref Range Status   02/04/2021 1.05 0.70 - 1.20 mg/dL Final     Potassium   Date Value Ref Range Status   02/04/2021 3.9 3.7 - 5.3 mmol/L Final

## 2021-03-18 ENCOUNTER — TELEMEDICINE (OUTPATIENT)
Dept: PSYCHIATRY | Age: 41
End: 2021-03-18
Payer: COMMERCIAL

## 2021-03-18 DIAGNOSIS — Z91.49 PSYCHOLOGICAL TRAUMA HISTORY: ICD-10-CM

## 2021-03-18 DIAGNOSIS — F33.9 MAJOR DEPRESSIVE DISORDER, RECURRENT EPISODE WITH ANXIOUS DISTRESS (HCC): Primary | ICD-10-CM

## 2021-03-18 PROCEDURE — 99213 OFFICE O/P EST LOW 20 MIN: CPT | Performed by: NURSE PRACTITIONER

## 2021-03-18 RX ORDER — RISPERIDONE 0.5 MG/1
0.5 TABLET, FILM COATED ORAL 2 TIMES DAILY
Qty: 60 TABLET | Refills: 0 | Status: SHIPPED | OUTPATIENT
Start: 2021-03-18 | End: 2021-04-15

## 2021-03-18 RX ORDER — PRAZOSIN HYDROCHLORIDE 1 MG/1
1 CAPSULE ORAL NIGHTLY
Qty: 30 CAPSULE | Refills: 0 | Status: SHIPPED | OUTPATIENT
Start: 2021-03-18 | End: 2021-04-15

## 2021-03-18 RX ORDER — SERTRALINE HYDROCHLORIDE 100 MG/1
150 TABLET, FILM COATED ORAL DAILY
Qty: 45 TABLET | Refills: 0 | Status: SHIPPED | OUTPATIENT
Start: 2021-03-18 | End: 2021-04-15

## 2021-03-18 NOTE — PROGRESS NOTES
Behavioral Health Consultation  Rachna Neri, MSN, APRN-CNP, PMHNP-BC  3/18/2021, 3:01 PM      Time spent with Patient:  15 minutes  This  was a telehealth visit. Patient Location: Home. Provider Location: Home office in [place], [place]  This virtual visit was conducted via interactive/real-time audio/video. Chief Complaint:follow up for anxiety and depression. [place]:  Reason for visit is medication management follow up. He has been compliant with medications. Pt reports that medications have  been working. Delpha Fake states that he feels his meds are chanell working, but reports he is still having some panic attack issues. Pt denies side effects from medications. Pt denies hallucinations. Pt reports there has been changes to appetite. Delpha Fake states that he is eating more  Pt reports sleep has been alright. Pt denies  current exercise. Pt denies current suicidal ideation, plan and intent. Pt  denies current homicidal ideation, plan and [place] Social:dating same woman for seven years, no kids. Has two kids from previous relationship. Not working currently. Relies on girlfriend for support, hs grad. Past Psychiatric history:   The patient has a history of  depression and anxiety disorder. Current treatment includes Anti-depressant: sertraline and Anti-psychotic: risperidone. Patient denies side effects from current treatment. Previous treatment has included: Zoloft- just started taking this feels it is working and Wellbutrin- can't remember much about this one. Family Mental Health history:   Pertinent family history: mother has some mental health issues.     MSE:    Appearance: alert, cooperative  Attention:Intact  Appetite: normal  Ambulation: normal.   Sleep disturbance: Yes  Loss of pleasure: Yes  Speech: spontaneous, normal rate, normal volume and well articulated  Mood: Anxious  Affect: depressed affect  Thought Content: intact  Insight: Fair  Judgment: Intact  Memory: Intact long-term and Intact short-term  Suicide Assessment: no suicidal ideation  Homicide Assessment: denies current homicidal ideation, plan and intent    History:      Review of Systems:   Constitutional: negative  HENT: negative  Eyes: positive for eyeglasses  Respiratory: negative  Cardiovascular: negative  Gastrointestinal: negative  Genitourinary: negative  Musculoskeletal: negative  Skin:negative  Neurological:negative  Endo/Heme/Allergies:positive for buspar allergy      Current Outpatient Medications:     sertraline (ZOLOFT) 100 MG tablet, Take 1.5 tablets by mouth daily, Disp: 45 tablet, Rfl: 0    risperiDONE (RISPERDAL) 0.5 MG tablet, Take 1 tablet by mouth 2 times daily, Disp: 60 tablet, Rfl: 0    prazosin (MINIPRESS) 1 MG capsule, Take 1 capsule by mouth nightly, Disp: 30 capsule, Rfl: 0    LANTUS SOLOSTAR 100 UNIT/ML injection pen, INJECT 10 UNITS UNDER THE SKIN ONCE DAILY AS DIRECTED, Disp: 5 pen, Rfl: 2    famotidine (PEPCID) 20 MG tablet, TAKE ONE TABLET BY MOUTH TWICE A DAY, Disp: 60 tablet, Rfl: 5    hydrOXYzine (ATARAX) 25 MG tablet, Take 1 tablet by mouth 3 times daily as needed for Anxiety, Disp: 90 tablet, Rfl: 0    traZODone (DESYREL) 50 MG tablet, Take 0.5 tablets by mouth nightly as needed for Sleep, Disp: 15 tablet, Rfl: 0    atenolol (TENORMIN) 100 MG tablet, Take 1 tablet by mouth daily Dose increased 2/2/2021.for HTN and anxiety.  Keep BP bellow 1135/85 and above 115/65, and pulse 56-80, Disp: 90 tablet, Rfl: 3    metFORMIN (GLUCOPHAGE) 1000 MG tablet, Take 1 tablet by mouth 2 times daily (with meals), Disp: 60 tablet, Rfl: 5    atorvastatin (LIPITOR) 10 MG tablet, Take 1 tablet by mouth nightly, Disp: 90 tablet, Rfl: 3    aspirin EC 81 MG EC tablet, Take 1 tablet by mouth daily, Disp: 90 tablet, Rfl: 3    lisinopril (PRINIVIL;ZESTRIL) 40 MG tablet, Take 1 tablet by mouth daily, Disp: 90 tablet, Rfl: 3    hydroCHLOROthiazide (HYDRODIURIL) 50 MG tablet, Take 1 tablet by mouth daily, Disp: 90 tablet, Rfl: 3    potassium chloride (KLOR-CON M) 10 MEQ extended release tablet, Take 1 tablet by mouth daily, Disp: 90 tablet, Rfl: 3    UNIFINE PENTIPS 31G X 6 MM MISC, Inject 1 each into the skin daily, Disp: 100 each, Rfl: 3    blood glucose monitor strips, Test 2-3 times a day & as needed for symptoms of irregular blood glucose., Disp: 100 strip, Rfl: 5    Alcohol Swabs (ALCOHOL PREP) PADS, Use as directed, Disp: 100 each, Rfl: 5    Lancets MISC, 1 each by Does not apply route 2 times daily, Disp: 300 each, Rfl: 1    Blood Pressure Monitor KIT, Use as directed., Disp: 1 kit, Rfl: 1    glucose monitoring kit (FREESTYLE) monitoring kit, Use as directed, Disp: 1 kit, Rfl: 0     PDMP Monitoring:    Last PDMP Jerry as Reviewed [facility]):  Review User Review Instant Review Result          Last Controlled Substance Monitoring Documentation      ED from 9/20/2017 in [facility]   Attestation  The Prescription Monitoring Report for this patient was reviewed today. filed at 09/20/2017 1529        Urine Drug Screenings (1 yr)     No resulted procedures found. Medication Contract and Consent for Opioid Use Documents Filed      No documents found                 OARRS checked and there were no signs of substance abuse, or prescription misuse.          Social History     Socioeconomic History    Marital status: Single     Spouse name: Not on file    Number of children: Not on file    Years of education: Not on file    Highest education level: Not on file   Occupational History    Not on file   Social Needs    Financial resource strain: Not on file    Food insecurity     Worry: Not on file     Inability: Not on file    Transportation needs     Medical: Not on file     Non-medical: Not on file   Tobacco Use    Smoking status: Former Smoker     Packs/day: 1.25     Years: 23.00     Pack years: 28.75     Types: Cigarettes     Quit date: 3/26/2017     Years since quitting:  Alzheimer's Disease Maternal Grandfather     Diabetes Paternal Grandmother     Heart Disease Paternal Grandmother 54        Approximate age   Elida Barbour Diabetes Paternal Grandfather      Last Labs:   Lab Results   Component Value Date    LABA1C 6.5 (H) 12/02/2020     Lab Results   Component Value Date     12/02/2020      Lab Results   Component Value Date    WBC 13.9 (H) 02/04/2021    HGB 14.6 02/04/2021    HCT 45.5 02/04/2021    MCV 85.7 02/04/2021     02/04/2021    LYMPHOPCT 16 (L) 02/04/2021    RBC 5.31 02/04/2021    MCH 27.5 02/04/2021    MCHC 32.1 02/04/2021    RDW 13.2 02/04/2021          Lab Results   Component Value Date     (L) 02/04/2021    K 3.9 02/04/2021    CL 94 (L) 02/04/2021    CO2 25 02/04/2021    BUN 24 (H) 02/04/2021    CREATININE 1.05 02/04/2021    GLUCOSE 167 (H) 02/04/2021    CALCIUM 9.6 02/04/2021    PROT 7.9 02/04/2021    LABALBU 4.7 02/04/2021    BILITOT 0.60 02/04/2021    ALKPHOS 57 02/04/2021    AST 26 02/04/2021    ALT 30 02/04/2021    LABGLOM >60 02/04/2021    GFRAA >60 02/04/2021    GLOB NOT REPORTED 02/04/2021      . last    Diagnosis:      1. Major depressive disorder, recurrent episode with anxious distress ([facility] [address]    2. Psychological trauma history      Plan:    Discussed increasing the sertraline, and lowering the risperidone . Discussed risks and benefits of medications, as well as need for yearly lab work. Follow up with therapy for continued therapy. Continue work with PCP to manage medical concerns, and [facility] for continued follow-up. No orders of the defined types were placed in this encounter.      Orders Placed This Encounter   Medications    sertraline (ZOLOFT) 100 MG tablet     Sig: Take 1.5 tablets by mouth daily     Dispense:  45 tablet     Refill:  0    risperiDONE (RISPERDAL) 0.5 MG tablet     Sig: Take 1 tablet by mouth 2 times daily     Dispense:  60 tablet     Refill:  0    prazosin (MINIPRESS) 1 MG capsule     Sig: Take 1 capsule by mouth

## 2021-03-24 ENCOUNTER — TELEMEDICINE (OUTPATIENT)
Dept: BEHAVIORAL/MENTAL HEALTH CLINIC | Age: 41
End: 2021-03-24
Payer: COMMERCIAL

## 2021-03-24 DIAGNOSIS — F43.10 POST TRAUMATIC STRESS DISORDER (PTSD): Primary | ICD-10-CM

## 2021-03-24 DIAGNOSIS — F34.1 PERSISTENT DEPRESSIVE DISORDER: ICD-10-CM

## 2021-03-24 PROCEDURE — 90791 PSYCH DIAGNOSTIC EVALUATION: CPT | Performed by: PSYCHOLOGIST

## 2021-03-24 PROCEDURE — 1036F TOBACCO NON-USER: CPT | Performed by: PSYCHOLOGIST
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
## 2021-04-05 ASSESSMENT — COLUMBIA-SUICIDE SEVERITY RATING SCALE - C-SSRS
3. HAVE YOU BEEN THINKING ABOUT HOW YOU MIGHT KILL YOURSELF?: NO
5. HAVE YOU STARTED TO WORK OUT OR WORKED OUT THE DETAILS OF HOW TO KILL YOURSELF? DO YOU INTEND TO CARRY OUT THIS PLAN?: NO
1. WITHIN THE PAST MONTH, HAVE YOU WISHED YOU WERE DEAD OR WISHED YOU COULD GO TO SLEEP AND NOT WAKE UP?: YES

## 2021-04-05 ASSESSMENT — PATIENT HEALTH QUESTIONNAIRE - PHQ9
2. FEELING DOWN, DEPRESSED OR HOPELESS: 1
8. MOVING OR SPEAKING SO SLOWLY THAT OTHER PEOPLE COULD HAVE NOTICED. OR THE OPPOSITE, BEING SO FIGETY OR RESTLESS THAT YOU HAVE BEEN MOVING AROUND A LOT MORE THAN USUAL: 0
SUM OF ALL RESPONSES TO PHQ QUESTIONS 1-9: 16
7. TROUBLE CONCENTRATING ON THINGS, SUCH AS READING THE NEWSPAPER OR WATCHING TELEVISION: 2
10. IF YOU CHECKED OFF ANY PROBLEMS, HOW DIFFICULT HAVE THESE PROBLEMS MADE IT FOR YOU TO DO YOUR WORK, TAKE CARE OF THINGS AT HOME, OR GET ALONG WITH OTHER PEOPLE: 1
3. TROUBLE FALLING OR STAYING ASLEEP: 2

## 2021-04-06 ENCOUNTER — TELEMEDICINE (OUTPATIENT)
Dept: FAMILY MEDICINE CLINIC | Age: 41
End: 2021-04-06
Payer: COMMERCIAL

## 2021-04-06 DIAGNOSIS — E11.65 TYPE 2 DIABETES MELLITUS WITH HYPERGLYCEMIA, WITHOUT LONG-TERM CURRENT USE OF INSULIN (HCC): ICD-10-CM

## 2021-04-06 DIAGNOSIS — Z11.59 ENCOUNTER FOR SCREENING FOR OTHER VIRAL DISEASES: ICD-10-CM

## 2021-04-06 DIAGNOSIS — I10 ESSENTIAL HYPERTENSION: Primary | ICD-10-CM

## 2021-04-06 DIAGNOSIS — F33.1 MODERATE EPISODE OF RECURRENT MAJOR DEPRESSIVE DISORDER (HCC): ICD-10-CM

## 2021-04-06 DIAGNOSIS — E78.5 HYPERLIPIDEMIA WITH TARGET LDL LESS THAN 100: ICD-10-CM

## 2021-04-06 PROBLEM — Z13.31 POSITIVE DEPRESSION SCREENING: Status: RESOLVED | Noted: 2018-10-24 | Resolved: 2021-04-06

## 2021-04-06 PROBLEM — R06.09 DOE (DYSPNEA ON EXERTION): Status: RESOLVED | Noted: 2021-02-18 | Resolved: 2021-04-06

## 2021-04-06 PROBLEM — R60.0 PERIPHERAL EDEMA: Status: RESOLVED | Noted: 2020-03-09 | Resolved: 2021-04-06

## 2021-04-06 PROBLEM — R73.9 HYPERGLYCEMIA: Status: RESOLVED | Noted: 2017-10-10 | Resolved: 2021-04-06

## 2021-04-06 PROBLEM — R00.2 PALPITATIONS: Status: RESOLVED | Noted: 2018-04-05 | Resolved: 2021-04-06

## 2021-04-06 PROBLEM — R63.4 UNINTENDED WEIGHT LOSS: Status: RESOLVED | Noted: 2021-02-18 | Resolved: 2021-04-06

## 2021-04-06 PROBLEM — R10.11 RUQ PAIN: Status: RESOLVED | Noted: 2017-10-10 | Resolved: 2021-04-06

## 2021-04-06 PROBLEM — R19.8 POSITIVE MURPHY'S SIGN: Status: RESOLVED | Noted: 2017-10-10 | Resolved: 2021-04-06

## 2021-04-06 PROBLEM — R60.9 PERIPHERAL EDEMA: Status: RESOLVED | Noted: 2020-03-09 | Resolved: 2021-04-06

## 2021-04-06 PROBLEM — F34.1 PERSISTENT DEPRESSIVE DISORDER: Status: RESOLVED | Noted: 2021-03-24 | Resolved: 2021-04-06

## 2021-04-06 PROCEDURE — 99214 OFFICE O/P EST MOD 30 MIN: CPT | Performed by: FAMILY MEDICINE

## 2021-04-06 PROCEDURE — 2022F DILAT RTA XM EVC RTNOPTHY: CPT | Performed by: FAMILY MEDICINE

## 2021-04-06 PROCEDURE — 3046F HEMOGLOBIN A1C LEVEL >9.0%: CPT | Performed by: FAMILY MEDICINE

## 2021-04-06 PROCEDURE — G8427 DOCREV CUR MEDS BY ELIG CLIN: HCPCS | Performed by: FAMILY MEDICINE

## 2021-04-06 ASSESSMENT — ENCOUNTER SYMPTOMS
SHORTNESS OF BREATH: 0
WHEEZING: 0
DIARRHEA: 0
ABDOMINAL PAIN: 0
CONSTIPATION: 0
ABDOMINAL DISTENTION: 0
NAUSEA: 0
VOMITING: 0
CHEST TIGHTNESS: 0
COUGH: 0

## 2021-04-06 NOTE — PATIENT INSTRUCTIONS
Patient Education        Learning About Low-Carbohydrate Diets  What is a low-carbohydrate diet? A low-carbohydrate (or \"low-carb\") diet limits foods and drinks that have carbohydrates. This includes grains, fruits, milk and yogurt, and starchy vegetables like potatoes, beans, and corn. It also avoids foods and drinks that have added sugar. Instead, low-carb diets include foods that are high in protein and fat. Why might you follow a low-carb diet? Low-carb diets may be used for a variety of reasons, such as for weight loss. People who have diabetes may use a low-carb diet to help manage their blood sugar levels. What should you do before you start the diet? Talk to your doctor before you try any diet. This is even more important if you have health problems like kidney disease, heart disease, or diabetes. Your doctor may suggest that you meet with a registered dietitian. A dietitian can help you make an eating plan that works for you. What foods do you eat on a low-carb diet? On a low-carb diet, you choose foods that are high in protein and fat. Examples of these are:  · Meat, poultry, and fish. · Eggs. · Nuts, such as walnuts, pecans, almonds, and peanuts. · Peanut butter and other nut butters. · Tofu. · Avocado. · Huel Rend. · Non-starchy vegetables like broccoli, cauliflower, green beans, mushrooms, peppers, lettuce, and spinach. · Unsweetened non-dairy milks like almond milk and coconut milk. · Cheese, cottage cheese, and cream cheese. Current as of: December 17, 2020               Content Version: 12.8  © 2006-2021 [employer], [employer]. Care instructions adapted under license by [facility] ([facility]). If you have questions about a medical condition or this instruction, always ask your healthcare professional. [address] any warranty or liability for your use of this information.          Patient Education        Learning About Carbohydrate (Carb) Counting and Eating Out When counting. · If you are not sure how to count carbohydrate grams, use the Plate Method to plan meals. It is a good, quick way to make sure that you have a balanced meal. It also helps you spread carbs throughout the day. ? Divide your plate by types of foods. Put non-starchy vegetables on half the plate, meat or other protein food on one-quarter of the plate, and a grain or starchy vegetable in the final quarter of the plate. To this you can add a small piece of fruit and 1 cup of milk or yogurt, depending on how many carbs you are supposed to eat at a meal.  · Try to eat about the same amount of carbs at each meal. Do not \"save up\" your daily allowance of carbs to eat at one meal.  · Proteins have very little or no carbs per serving. Examples of proteins are beef, chicken, turkey, fish, eggs, tofu, cheese, cottage cheese, and peanut butter. A serving size of meat is 3 ounces, which is about the size of a deck of cards. Examples of meat substitute serving sizes (equal to 1 ounce of meat) are 1/4 cup of cottage cheese, 1 egg, 1 tablespoon of peanut butter, and ½ cup of tofu. How can you eat out and still eat healthy? · Learn to estimate the serving sizes of foods that have carbohydrate. If you measure food at home, it will be easier to estimate the amount in a serving of restaurant food. · If the meal you order has too much carbohydrate (such as potatoes, corn, or baked beans), ask to have a low-carbohydrate food instead. Ask for a salad or green vegetables. · If you use insulin, check your blood sugar before and after eating out to help you plan how much to eat in the future. · If you eat more carbohydrate at a meal than you had planned, take a walk or do other exercise. This will help lower your blood sugar. What are some tips for eating healthy? · Limit saturated fat, such as the fat from meat and dairy products. This is a healthy choice because people who have diabetes are at higher risk of heart disease.

## 2021-04-06 NOTE — PROGRESS NOTES
2021    TELEHEALTH EVALUATION -- Audio/Visual ([facility])      Essie Berumen (:  1980) is a 36 y.o. male,Established patient, here for evaluation of the following chief complaint(s): Diabetes, Hypertension, Hyperlipidemia, and Depression        ASSESSMENT/PLAN:    1. Essential hypertension  Well controlled. Continue current treatment. Will recheck labs. Discussed low salt diet and BP and pulse monitoring daily    -     CBC; Future  -     Comprehensive Metabolic Panel; Future  -     TSH without Reflex; Future  2. Type 2 diabetes mellitus with hyperglycemia, without long-term current use of insulin (HCC)  improved  Lab Results   Component Value Date    LABA1C 6.5 (H) 2020    LABA1C 11.3 2020    LABA1C 5.8 2019       -     CBC; Future  -     Comprehensive Metabolic Panel; Future  -     TSH without Reflex; Future  -     Vitamin B12 & Folate; Future  -     Microalbumin / Creatinine Urine Ratio; Future  -     Hemoglobin A1C; Future  -advised home blood glucose testing daily  -daily feet exam, Foot care: advised to wash feet daily, pat dry and apply lotion at night, avoiding between toes. Need to look at feet daily and report to a physician any signs of inflammation or skin damage  -annual dilated eye exam  -Low carb, low fat diet, increase fruits and vegetables, and exercise 4-5 times a day 30-40 minutes a day discussed  -continue current treatment  -continue Aspirin  -continue ACEI and statin. 3. Moderate episode of recurrent major depressive disorder [facility])  Improving  Continue current treatment and follow up with psychiatrist and psychologist as scheduled. Continue Risperdal, prazosin, Zoloft, trazodone, hydroxyzine  -     TSH without Reflex; Future    4. Hyperlipidemia with target LDL less than 100   Well-controlled  Continue Lipitor 10 mg daily  5. Encounter for screening for other viral diseases  -     Hepatitis C Antibody;  Future      Milana Espinosa as previously referred  I gave him the contact info to schedule an appointment on his own, will refax referral  The patient verbalizes understanding and agrees with the plan. BP controlled 130/70 mmHg   Lizy Baker reports compliance with BP medications, and tolerates them well, denies side effects. BP Readings from Last 3 Encounters:   02/05/21 123/61   12/16/20 134/86   03/09/20 (!) 138/101            Has known diabetes mellitus type 2    Home Blood Glucose 120-130, checking fasting blood glucose  A1c greatly improved  Patient reports compliance with Metformin and Lantus 10 units daily, denies side effects    He is not up-to-date with her eye exam, I advised him to schedule an appointment for diabetic eye exam    He is on aspirin, Lipitor, lisinopril    Lab Results   Component Value Date    LABA1C 6.5 (H) 12/02/2020    LABA1C 11.3 03/09/2020    LABA1C 5.8 05/02/2019       Weight is decreasing, improving  Patient says he has been eating healthier  Wt Readings from Last 3 Encounters:   02/04/21 (!) 473 lb (214.6 kg)   03/09/20 (!) 550 lb (249.5 kg)   02/10/20 (!) 548 lb (248.6 kg)             Depression \" is  not worse\"  Patient recently established with psychiatrist and psychologist in [facility], notes reviewed, he does have PTSD due to trauma in childhood. He says Risperdal was decreased, was giving him increased appetite      PHQ-2 Over the past 2 weeks, how often have you been bothered by any of the following problems? Little interest or pleasure in doing things: More than half the days  Feeling down, depressed, or hopeless: Several days  PHQ-2 Score: 3  PHQ-9 Over the past 2 weeks, how often have you been bothered by any of the following problems?   Trouble falling or staying asleep, or sleeping too much: More than half the days  Feeling tired or having little energy: More than half the days  Poor appetite or overeating: Nearly every day  Feeling bad about yourself - or that you are a failure or have let yourself or your family down: Nearly every day  Trouble concentrating on things, such as reading the newspaper or watching television: More than half the days  Moving or speaking so slowly that other people could have noticed. Or the opposite - being so fidgety or restless that you have been moving around a lot more than usual: Not at all  Thoughts that you would be better off dead, or of hurting yourself in some way: Several days  If you checked off any problems, how difficult have these problems made it for you to do your work, take care of things at home, or get along with other people?: Somewhat difficult  PHQ-9 Total Score: 16    Denies hallucination    Denies current suicidal ideation, plan or intent. He lives with his girlfriend who is actually present during the visit in the background    AMB C-SSRS Suicide Screening     1) Within the past month, have you wished you were dead or wished you could go to sleep and not wake up? YES   2) Have you actually had any thoughts of killing yourself? NO   3) Have you been thinking about how you might kill yourself? NO   4) Have you had these thoughts and had some intention of acting on them? NO   5) Have you started to work out or worked out the details of how to kill yourself? Do you intend to carry out this plan? NO   6) Have you ever done anything, started to do anything, or prepared to do anything to end your life? YES   Did this occur in the last three months? No       Moderate depression improving  PHQ Scores 4/5/2021 2/17/2021 2/2/2021 12/1/2020 10/18/2018 3/29/2018 10/10/2017   PHQ2 Score 3 4 3 4 6 4 5   PHQ9 Score 16 19 17 23 27 21 22       Hyperlipidemia:  No new myalgias or GI upset on atorvastatin (Lipitor). Medication compliance: compliant all of the time. Patient is  following a low fat, low cholesterol diet.     LDL is normal  Lab Results   Component Value Date    LDLCHOLESTEROL 53 12/02/2020     Lab Results   Component Value Date    TRIG 98 05/03/2019    TRIG 139 08/06/2018    TRIG 180 (H) 05/31/2017     Patient is due for hepatitis C screening. Bud Batista 's indication is CDC recommendation. Review of Systems   Constitutional: Positive for fatigue. Negative for activity change, appetite change, chills, diaphoresis, fever and unexpected weight change. Eyes: Positive for visual disturbance (stable, chronic, told to see cardiology). Respiratory: Negative for cough, chest tightness, shortness of breath and wheezing. Cardiovascular: Negative for chest pain, palpitations and leg swelling. Gastrointestinal: Negative for abdominal distention, abdominal pain, constipation, diarrhea, nausea and vomiting. Endocrine: Positive for polyphagia. Negative for cold intolerance, heat intolerance, polydipsia and polyuria. Neurological: Negative for numbness. Psychiatric/Behavioral: Positive for decreased concentration, dysphoric mood and sleep disturbance. Negative for hallucinations, self-injury and suicidal ideas. The patient is nervous/anxious. Prior to Visit Medications    Medication Sig Taking?  Authorizing Provider   sertraline (ZOLOFT) 100 MG tablet Take 1.5 tablets by mouth daily Yes STEPHON Lopez NP   risperiDONE (RISPERDAL) 0.5 MG tablet Take 1 tablet by mouth 2 times daily Yes STEPHON Lopez NP   prazosin (MINIPRESS) 1 MG capsule Take 1 capsule by mouth nightly Yes STEPHON Lopez NP   LANTUS SOLOSTAR 100 UNIT/ML injection pen INJECT 10 UNITS UNDER THE SKIN ONCE DAILY AS DIRECTED Yes Gregory Sinha MD   famotidine (PEPCID) 20 MG tablet TAKE ONE TABLET BY MOUTH TWICE A DAY Yes Gregory Sinha MD   hydrOXYzine (ATARAX) 25 MG tablet Take 1 tablet by mouth 3 times daily as needed for Anxiety Yes STEPHON Lopez NP   traZODone (DESYREL) 50 MG tablet Take 0.5 tablets by mouth nightly as needed for Sleep Yes STEPHON Lopez NP   atenolol (TENORMIN) 100 MG tablet Take 1 tablet by mouth daily Dose increased 2021.for HTN and anxiety. Keep BP bellow 1135/85 and above 115/65, and pulse 56-80 Yes Minerva Valencia MD   metFORMIN (GLUCOPHAGE) 1000 MG tablet Take 1 tablet by mouth 2 times daily (with meals) Yes Minerva Valencia MD   atorvastatin (LIPITOR) 10 MG tablet Take 1 tablet by mouth nightly Yes Minerva Valencia MD   aspirin EC 81 MG EC tablet Take 1 tablet by mouth daily Yes Minerva Valencia MD   lisinopril (PRINIVIL;ZESTRIL) 40 MG tablet Take 1 tablet by mouth daily Yes Minerva Valencia MD   hydroCHLOROthiazide (HYDRODIURIL) 50 MG tablet Take 1 tablet by mouth daily Yes Minerva Valencia MD   potassium chloride (KLOR-CON M) 10 MEQ extended release tablet Take 1 tablet by mouth daily Yes Minerva Valencia MD   UNIFINE PENTIPS 31G X 6 MM MISC Inject 1 each into the skin daily Yes Minerva Valencia MD   blood glucose monitor strips Test 2-3 times a day & as needed for symptoms of irregular blood glucose. Yes Minerva Valencia MD   Alcohol Swabs (ALCOHOL PREP) PADS Use as directed Yes Minerva Valencia MD   Lancets MISC 1 each by Does not apply route 2 times daily Yes Minerva Valencia MD   Blood Pressure Monitor KIT Use as directed.  Yes STEPHON Lantigua CNP   glucose monitoring kit (FREESTYLE) monitoring kit Use as directed Yes STEPHON Lantigua CNP       Social History     Tobacco Use    Smoking status: Former Smoker     Packs/day: 1.25     Years: 23.00     Pack years: 28.75     Types: Cigarettes     Quit date: 3/26/2017     Years since quittin.0    Smokeless tobacco: Never Used    Tobacco comment: Quit smoking cold turkey   Substance Use Topics    Alcohol use: No     Alcohol/week: 0.0 standard drinks    Drug use: No          PHYSICAL EXAMINATION:    Vital Signs: (As obtained by patient/caregiver or practitioner observation)  -vital signs stable and within normal limits except morbid obesity per BMI, last BMI 49.46 kg/M 2    Patient-Reported Vitals 4/6/2021   Patient-Reported Weight 470 lbs   Patient-Reported Height 6 ft 10 in   Patient-Reported Systolic 553   Patient-Reported Diastolic 70   Patient-Reported Pulse -           Intensity of pain is 0 out of 10      Constitutional: [x] Appears well-developed and well-nourished [x] No apparent distress      [x] Abnormal-obese      Mental status  [x] Alert and awake  [x] Oriented to person/place/time [x]Able to follow commands      Eyes:  EOM    [x]  Normal  [] Abnormal-  Sclera  [x]  Normal  [] Abnormal -         Discharge [x]  None visible  [] Abnormal -    HENT:   [x] Normocephalic, atraumatic. [] Abnormal   [x] Mouth/Throat: Mucous membranes are moist.     External Ears [x] Normal  [] Abnormal-     Neck: [x] No visualized mass     Pulmonary/Chest: [x] Respiratory effort normal.  [x] No visualized signs of difficulty breathing or respiratory distress        [] Abnormal        Musculoskeletal:   [x] Normal gait with no signs of ataxia         [x] Normal range of motion of neck        [] Abnormal-       Neurological:        [x] No Facial Asymmetry (Cranial nerve 7 motor function) (limited exam to video visit)          [x] No gaze palsy        [] Abnormal-            Skin:        [x] No significant exanthematous lesions or discoloration noted on facial skin         [] Abnormal-            Psychiatric:      [x] No Hallucinations       []Mood is normal      [x]Behavior is normal      [x]Judgment is normal      [x]Thought content is normal       [x] Abnormal-anxious and depressed    Other pertinent observable physical exam findings-none    Due to this being a TeleHealth encounter, evaluation of the following organ systems is limited: Vitals/Constitutional/EENT/Resp/CV/GI//MS/Neuro/Skin/Heme-Lymph-Imm. I personally reviewed most recent labs reviewed with the patient and all questions fully answered.    Leukocytosis  Hyperglycemia  Otherwise labs within normal limits        Lab Results   Component Value Date WBC 13.9 (H) 02/04/2021    HGB 14.6 02/04/2021    HCT 45.5 02/04/2021    MCV 85.7 02/04/2021     02/04/2021       Lab Results   Component Value Date     02/04/2021    K 3.9 02/04/2021    CL 94 02/04/2021    CO2 25 02/04/2021    BUN 24 02/04/2021    CREATININE 1.05 02/04/2021    GLUCOSE 167 02/04/2021    CALCIUM 9.6 02/04/2021        Lab Results   Component Value Date    ALT 30 02/04/2021    AST 26 02/04/2021    ALKPHOS 57 02/04/2021    BILITOT 0.60 02/04/2021       Lab Results   Component Value Date    TSH 2.90 12/02/2020       Lab Results   Component Value Date    CHOL 118 05/03/2019    CHOL 132 08/06/2018    CHOL 158 05/31/2017     Lab Results   Component Value Date    TRIG 98 05/03/2019    TRIG 139 08/06/2018    TRIG 180 (H) 05/31/2017     Lab Results   Component Value Date    HDL 45 12/02/2020    HDL 43 05/03/2019    HDL 41 08/06/2018     Lab Results   Component Value Date    LDLCHOLESTEROL 53 12/02/2020    LDLCHOLESTEROL 55 05/03/2019    LDLCHOLESTEROL 63 08/06/2018       Lab Results   Component Value Date    CHOLHDLRATIO 2.6 12/02/2020    CHOLHDLRATIO 2.7 05/03/2019    CHOLHDLRATIO 3.2 08/06/2018             Lab Results   Component Value Date    YBKPYZOZ89 788 12/02/2020         Orders Placed This Encounter   Procedures    CBC     Standing Status:   Future     Standing Expiration Date:   12/6/2021    Comprehensive Metabolic Panel     Standing Status:   Future     Standing Expiration Date:   12/6/2021    TSH without Reflex     Standing Status:   Future     Standing Expiration Date:   12/6/2021    Vitamin B12 & Folate     Standing Status:   Future     Standing Expiration Date:   12/6/2021    Hepatitis C Antibody     Standing Status:   Future     Standing Expiration Date:   12/6/2021    Microalbumin / Creatinine Urine Ratio     Standing Status:   Future     Standing Expiration Date:   12/6/2021    Hemoglobin A1C     Standing Status:   Future     Standing Expiration Date:   4/6/2022 Essie Berumen, was evaluated through a synchronous (real-time) audio-video encounter. The patient (or guardian if applicable) is aware that this is a billable service. Verbal consent to proceed has been obtained within the past 12 months. The visit was conducted pursuant to the emergency declaration under the [address] [place] [address], [address] [place] [address] and the [employer] [place] [employer]. Patient identification was verified    Patient was with his girlfriend, during the encounter  The patient was located in a state where the provider was credentialed to provide care. On this date 4/6/2021 I have spent 35 minutes reviewing previous notes, test results and face to face with the patient discussing the diagnosis and importance of compliance with the treatment plan as well as documenting on the day of the visit. --Cal Hernandez MD on 4/6/2021 at 6:24 PM    An electronic signature was used to authenticate this note.

## 2021-04-07 ENCOUNTER — TELEMEDICINE (OUTPATIENT)
Dept: BEHAVIORAL/MENTAL HEALTH CLINIC | Age: 41
End: 2021-04-07
Payer: COMMERCIAL

## 2021-04-07 DIAGNOSIS — F43.10 POST TRAUMATIC STRESS DISORDER (PTSD): Primary | ICD-10-CM

## 2021-04-07 DIAGNOSIS — F34.1 PERSISTENT DEPRESSIVE DISORDER: ICD-10-CM

## 2021-04-07 PROCEDURE — 1036F TOBACCO NON-USER: CPT | Performed by: PSYCHOLOGIST

## 2021-04-07 PROCEDURE — 90832 PSYTX W PT 30 MINUTES: CPT | Performed by: PSYCHOLOGIST

## 2021-04-07 NOTE — PROGRESS NOTES
Stacie Martinez,  Ph.D.   Licensed Clinical Psychologist ([phone])    Visit Date: 4/7/2021   Time of appointment:  3:05p - 3:33p   Time spent with Patient: 28 minutes. This is patient's second appointment. Reason for Consult:  Trauma, Anxiety, and Depression     Referring Provider/PCP:    No ref. provider found  Liya Aleman MD      Pt provided informed consent for the behavioral health program. Discussed with patient model of service to include the limits of confidentiality (i.e. abuse reporting, suicide intervention, etc.) and short-term intervention focused approach. Pt indicated understanding. Pt provided verbal consent to engage in telehealth psychotherapy. This visit was completed virtually using doxy. me (MyChart was attempted but had connection issues) due to contact restrictions related to the COVID-19 pandemic. Session was held in a private space ([facility] apartment in [place], [place]) and he reported that no one was in the room with him.       [facility]/Clinician Location:  [address] [place] [address] Cynthia Clifton ([facility]) is a 36 y.o. male who presents for follow up of anxiety, depression and psychological trauma as well as to discuss referring him to an external mental health provider for more long-term, intensive therapy. He reported that his mood has remained the same since his initial visit on 3/24/21. He stated that there have been no new major stressors other than feeling sick with cough (no fever) over the past few days, which has made him worried about possibly having COVID-19. He reported reluctance to being referred to an external mental health provider as it was already difficult for him to get established with the current [facility]. He expressed concern with having to start over with a new person and being able to build rapport with them.   With reassurance from the [facility], he stated that he is willing to try establishing therapy services with this new provider and acknowledged that he needs to continue therapy given how much his past trauma and ongoing mood problems impair his daily functioning. Previous Recommendations:   1)  [facility] will identify external mental health providers who have a background in trauma treatment and who accept pt's insurance. 2)  Pt will be seen for a follow-up telehealth appointment by the [facility] in 2 weeks on 4/7/21 at [address] [place] [address] to continue discussing referral options and assisting him in establishing more long-term therapy services. MENTAL STATUS EXAM  Mood was anxious, depressed and irritable with congruent affect. Suicidal ideation was denied. Homicidal ideation was denied. Hygiene was good . Dress was appropriate. Behavior was Within Normal Limits with No self-report of difficulties ambulating. Attitude was Cooperative. Eye-contact was good. Speech: rate - WNL, rhythm - WNL, volume - WNL. Verbalizations were goal directed and coherent. Thought processes were intact and goal-oriented without evidence of delusions, hallucinations, obsessions, or lucille; with no cognitive distortions. Associations were characterized by intact cognitive processes. Pt was orientated oriented to person, place, time, and general circumstances;  recent:  impaired. Insight and judgment were estimated to be fair, AEB, a fair  understanding of cyclical maladaptive patterns, and the ability to use insight to inform behavior change. ASSESSMENT  Marylen Amato [facility]) presented to the appointment today for evaluation and treatment of symptoms of anxiety, depress, and psychological trauma. He is currently deemed low to moderate risk to himself or others given his history of chronic suicidal ideation, but he denied any recent or current specific plan or intent to harm himself. He meets criteria for PTSD and Persistent Depressive Disorder With persistent major depressive episode.   His mood remains unchanged since the last visit. He would greatly benefit from a referral to an external mental health clinician who can provide more frequent and intensive therapy than can be provided by the [facility] in primary care. In particular, he's being referred to therapist Latricia Gil with [facility] who confirmed that they accept his insurance. Although hesitant to be referred out to a new mental health provider, Dewayne Hernandez was in ultimately in agreement with recommendations. [facility] and the [facility] will facilitate the referral process. PHQ Scores 4/5/2021 2/17/2021 2/2/2021 12/1/2020 10/18/2018 3/29/2018 10/10/2017   PHQ2 Score 3 4 3 4 6 4 5   PHQ9 Score 16 19 17 23 27 21 22     Interpretation of Total Score Depression Severity: 1-4 = Minimal depression, 5-9 = Mild depression, 10-14 = Moderate depression, 15-19 = Moderately severe depression, 20-27 = Severe depression    How often pt has had thoughts of death or hurting self (if PHQ positive for depression):       APARNA 7 SCORE 2/2/2021 10/18/2018 3/29/2018   APARNA-7 Total Score 21 21 20     Interpretation of APARNA-7 score: 5-9 = mild anxiety, 10-14 = moderate anxiety, 15+ = severe anxiety. Recommend referral to behavioral health for scores 10 or greater. DIAGNOSIS  Keaton Mittal was seen today for trauma, anxiety and depression. Diagnoses and all orders for this visit:    Post traumatic stress disorder (PTSD)    Persistent depressive disorder  Comments: With persistent major depressive episode.           INTERVENTION  Established rapport, Supportive techniques, Provided Psychoeducation re: therapeutic process and how trauma underlies depression and anxiety and Collaborative treatment planning,Clarified role of [facility] in primary care,Recommended that pt establish with a mental health clinician with whom they can meet regularly for psychotherapy services      PLAN  1)  Pt will follow through on scheduling an appointment to establish therapy services with [facility]. 2)  Pt will be seen for a follow-up telehealth appointment by the [facility] in 5 weeks on 5/12/21 at [address] [place] [address] to check on his status and follow through with establishing therapy services with [facility]. INTERACTIVE COMPLEXITY  Is interactive complexity present?   No  Reason:  N/A  Additional Supporting Information:  N/A       Electronically signed by Wilbur Anglin, PhD on 4/7/21 at 3:04 PM EDT

## 2021-04-15 ENCOUNTER — TELEMEDICINE (OUTPATIENT)
Dept: PSYCHIATRY | Age: 41
End: 2021-04-15
Payer: COMMERCIAL

## 2021-04-15 DIAGNOSIS — F33.1 MODERATE EPISODE OF RECURRENT MAJOR DEPRESSIVE DISORDER (HCC): Primary | ICD-10-CM

## 2021-04-15 DIAGNOSIS — Z91.49 PSYCHOLOGICAL TRAUMA HISTORY: ICD-10-CM

## 2021-04-15 DIAGNOSIS — F33.9 MAJOR DEPRESSIVE DISORDER, RECURRENT EPISODE WITH ANXIOUS DISTRESS (HCC): Primary | ICD-10-CM

## 2021-04-15 DIAGNOSIS — F63.81 INTERMITTENT EXPLOSIVE DISORDER IN ADULT: ICD-10-CM

## 2021-04-15 DIAGNOSIS — F33.1 MODERATE EPISODE OF RECURRENT MAJOR DEPRESSIVE DISORDER (HCC): ICD-10-CM

## 2021-04-15 PROCEDURE — 99214 OFFICE O/P EST MOD 30 MIN: CPT | Performed by: NURSE PRACTITIONER

## 2021-04-15 RX ORDER — RISPERIDONE 0.5 MG/1
TABLET, FILM COATED ORAL
Qty: 60 TABLET | Refills: 0 | Status: SHIPPED | OUTPATIENT
Start: 2021-04-15 | End: 2021-04-15 | Stop reason: SDUPTHER

## 2021-04-15 RX ORDER — SERTRALINE HYDROCHLORIDE 100 MG/1
TABLET, FILM COATED ORAL
Qty: 45 TABLET | Refills: 0 | Status: SHIPPED | OUTPATIENT
Start: 2021-04-15 | End: 2021-04-15

## 2021-04-15 RX ORDER — PRAZOSIN HYDROCHLORIDE 1 MG/1
CAPSULE ORAL
Qty: 30 CAPSULE | Refills: 0 | Status: SHIPPED | OUTPATIENT
Start: 2021-04-15 | End: 2021-04-15 | Stop reason: SDUPTHER

## 2021-04-15 RX ORDER — PRAZOSIN HYDROCHLORIDE 1 MG/1
1 CAPSULE ORAL NIGHTLY
Qty: 30 CAPSULE | Refills: 0 | Status: SHIPPED | OUTPATIENT
Start: 2021-04-15 | Stop reason: SDUPTHER

## 2021-04-15 RX ORDER — SERTRALINE HYDROCHLORIDE 100 MG/1
150 TABLET, FILM COATED ORAL DAILY
Qty: 45 TABLET | Refills: 0 | Status: SHIPPED | OUTPATIENT
Start: 2021-04-15 | End: 2021-05-13

## 2021-04-15 RX ORDER — RISPERIDONE 0.5 MG/1
0.5 TABLET, FILM COATED ORAL DAILY
Qty: 60 TABLET | Refills: 0 | Status: SHIPPED | OUTPATIENT
Start: 2021-04-15 | End: 2021-05-13

## 2021-04-15 NOTE — TELEPHONE ENCOUNTER
.Please Approve or Refuse.   Send to Pharmacy per Pt's Request:      Next Visit Date:  4/15/2021   Last Visit Date: 3/18/2021

## 2021-04-15 NOTE — PROGRESS NOTES
Behavioral Health Consultation  Emily Stack, MSN, APRN-CNP, PMHNP-BC  4/15/2021, 3:06 PM      Time spent with Patient:  30 minutes  This was a telehealth visit. Patient Location: Home. Provider Location: Home office in [place], [place]  This virtual visit was conducted via interactive/real-time audio/video. Chief Complaint:follow up for depression and anxiety. Unga:  Reason for visit is medication management follow up. He has been compliant with medications. Pt reports that medications have  been working. Pao Espinosa states that he feels his depression is better, and he feels that his anxiety has been manageable. Pt denies side effects from medications. Pt denies hallucinations. Pt reports there has been changes to appetite. Pt reports sleep has been better. Pt denies  current exercise. Pt denies current suicidal ideation, plan and intent. Pt  denies current homicidal ideation, plan and [employer]). Social:dating same woman for seven years, no kids. Has two kids from previous relationship. Not working currently. Junie Cahtterjee on girlfriend for support, hs grad. Past Psychiatric history:   The patient has a history of  depression and anxiety disorder.     Current treatment includes Anti-depressant: sertraline and Anti-psychotic: risperidone.  Patient denies side effects from current treatment.  Previous treatment has included: Zoloft- just started taking this feels it is working and Wellbutrin- can't remember much about this one.    Family Mental Health history:   Pertinent family history: mother has some mental health issues. MSE:    Appearance: alert, cooperative  Attention:Intact  Appetite: normal  Ambulation: unable to assess.    Sleep disturbance: Yes  Loss of pleasure: Yes  Speech: spontaneous, normal rate, normal volume and well articulated  Mood: Depressed  Affect: depressed affect  Thought Content: intact  Insight: Fair  Judgment: Intact  Memory: Intact long-term and Intact short-term  Suicide Assessment: no suicidal ideation  Homicide Assessment: denies current homicidal ideation, plan and intent    History:      Review of Systems:   Constitutional: negative  HENT: negative  Eyes: positive for eyeglasses  Respiratory: negative  Cardiovascular: negative  Gastrointestinal: negative  Genitourinary: negative  Musculoskeletal: negative  Skin:negative  Neurological:negative  Endo/Heme/Allergies:positive for buspar allergy      Current Outpatient Medications:     prazosin (MINIPRESS) 1 MG capsule, Take 1 capsule by mouth nightly, Disp: 30 capsule, Rfl: 0    sertraline (ZOLOFT) 100 MG tablet, Take 1.5 tablets by mouth daily, Disp: 45 tablet, Rfl: 0    risperiDONE (RISPERDAL) 0.5 MG tablet, Take 1 tablet by mouth daily, Disp: 60 tablet, Rfl: 0    LANTUS SOLOSTAR 100 UNIT/ML injection pen, INJECT 10 UNITS UNDER THE SKIN ONCE DAILY AS DIRECTED, Disp: 5 pen, Rfl: 2    famotidine (PEPCID) 20 MG tablet, TAKE ONE TABLET BY MOUTH TWICE A DAY, Disp: 60 tablet, Rfl: 5    hydrOXYzine (ATARAX) 25 MG tablet, Take 1 tablet by mouth 3 times daily as needed for Anxiety, Disp: 90 tablet, Rfl: 0    traZODone (DESYREL) 50 MG tablet, Take 0.5 tablets by mouth nightly as needed for Sleep, Disp: 15 tablet, Rfl: 0    atenolol (TENORMIN) 100 MG tablet, Take 1 tablet by mouth daily Dose increased 2/2/2021.for HTN and anxiety.  Keep BP bellow 1135/85 and above 115/65, and pulse 56-80, Disp: 90 tablet, Rfl: 3    metFORMIN (GLUCOPHAGE) 1000 MG tablet, Take 1 tablet by mouth 2 times daily (with meals), Disp: 60 tablet, Rfl: 5    atorvastatin (LIPITOR) 10 MG tablet, Take 1 tablet by mouth nightly, Disp: 90 tablet, Rfl: 3    aspirin EC 81 MG EC tablet, Take 1 tablet by mouth daily, Disp: 90 tablet, Rfl: 3    lisinopril (PRINIVIL;ZESTRIL) 40 MG tablet, Take 1 tablet by mouth daily, Disp: 90 tablet, Rfl: 3    hydroCHLOROthiazide (HYDRODIURIL) 50 MG tablet, Take 1 tablet by mouth daily, Disp: 90 tablet, Rfl: 3   3/26/2017     Years since quittin.0    Smokeless tobacco: Never Used    Tobacco comment: Quit smoking cold turkey   Substance and Sexual Activity    Alcohol use: No     Alcohol/week: 0.0 standard drinks    Drug use: No    Sexual activity: Yes     Partners: Female   Lifestyle    Physical activity     Days per week: Not on file     Minutes per session: Not on file    Stress: Not on file   Relationships    Social connections     Talks on phone: Not on file     Gets together: Not on file     Attends Zoroastrianism service: Not on file     Active member of club or organization: Not on file     Attends meetings of clubs or organizations: Not on file     Relationship status: Not on file    Intimate partner violence     Fear of current or ex partner: Not on file     Emotionally abused: Not on file     Physically abused: Not on file     Forced sexual activity: Not on file   Other Topics Concern    Not on file   Social History Narrative    ** Merged History Encounter **            TOBACCO: Neville Harada  reports that he quit smoking about 4 years ago. His smoking use included cigarettes. He has a 28.75 pack-year smoking history. He has never used smokeless tobacco.  ETOH: Neville Harada  reports no history of alcohol use.     Past Medical History:   Diagnosis Date    Anxiety     Chronic left-sided low back pain with left-sided sciatica 2018    Depression     Essential hypertension 2015    Gastroesophageal reflux disease without esophagitis 10/24/2018    Headache     Hyperglycemia 10/10/2017    Hyperlipidemia     Hypertension     Intermittent explosive disorder in adult 10/24/2017    Morbid obesity with BMI of 45.0-49.9, adult ([facility] [address] 10/10/2017    Palpitations 2018    Peripheral edema 3/9/2020    Positive Clay's Sign 10/10/2017    RUQ pain 10/10/2017    Type 2 diabetes mellitus with hyperglycemia, without long-term current use of insulin ([facility] [address] 3/9/2020    Unintended weight loss 3335      Metabolic monitoring is being done by PCP. Family History   Problem Relation Age of Onset    Diabetes Mother     Bipolar Disorder Mother     Diabetes Father     Diabetes Maternal Grandmother     Diabetes Maternal Grandfather     Cancer Maternal Grandfather     Alzheimer's Disease Maternal Grandfather     Diabetes Paternal Grandmother     Heart Disease Paternal Grandmother 54        Approximate age   Leslie Guerin Diabetes Paternal Grandfather      Last Labs:   Lab Results   Component Value Date    LABA1C 6.5 (H) 12/02/2020     Lab Results   Component Value Date     12/02/2020      Lab Results   Component Value Date    WBC 13.9 (H) 02/04/2021    HGB 14.6 02/04/2021    HCT 45.5 02/04/2021    MCV 85.7 02/04/2021     02/04/2021    LYMPHOPCT 16 (L) 02/04/2021    RBC 5.31 02/04/2021    MCH 27.5 02/04/2021    MCHC 32.1 02/04/2021    RDW 13.2 02/04/2021          Lab Results   Component Value Date     (L) 02/04/2021    K 3.9 02/04/2021    CL 94 (L) 02/04/2021    CO2 25 02/04/2021    BUN 24 (H) 02/04/2021    CREATININE 1.05 02/04/2021    GLUCOSE 167 (H) 02/04/2021    CALCIUM 9.6 02/04/2021    PROT 7.9 02/04/2021    LABALBU 4.7 02/04/2021    BILITOT 0.60 02/04/2021    ALKPHOS 57 02/04/2021    AST 26 02/04/2021    ALT 30 02/04/2021    LABGLOM >60 02/04/2021    GFRAA >60 02/04/2021    GLOB NOT REPORTED 02/04/2021      . last    Diagnosis:      1. Major depressive disorder, recurrent episode with anxious distress ([facility] [address]    2. Psychological trauma history    3. Intermittent explosive disorder in adult    4. Moderate episode of recurrent major depressive disorder ([facility] [address]      Plan:    Discussed increasing the sertraline to 150mg/day. Discussed risks and benefits of medications, as well as need for yearly lab work. Follow up with therapist for continued therapy. Continue work with PCP to manage medical concerns, and [facility] for continued follow-up. No orders of the defined types were placed in this encounter.      Orders Placed This Encounter   Medications    prazosin (MINIPRESS) 1 MG capsule     Sig: Take 1 capsule by mouth nightly     Dispense:  30 capsule     Refill:  0    sertraline (ZOLOFT) 100 MG tablet     Sig: Take 1.5 tablets by mouth daily     Dispense:  45 tablet     Refill:  0    risperiDONE (RISPERDAL) 0.5 MG tablet     Sig: Take 1 tablet by mouth daily     Dispense:  60 tablet     Refill:  0       Pt interventions:    Discussed importance of medication adherence, Discussed risks, benefits, side effects of medication and need for follow up treatment, Discussed benefits of referral for specialty care and Discussed self-care (sleep, nutrition, rewarding activities, social support, exercise)

## 2021-04-26 NOTE — TELEPHONE ENCOUNTER
PATIENT CALLED STATING HIS FAMOTIDINE KEEPS GETTING DENIED FOR REFILLS, RX STATES REFILL IS NOT APPROPRIATE.  HE SAID HE WAS NEVER INFORMED TO STOP THIS MEDICATION PLEASE ADVISE care professionals.    Lizzy March MD

## 2021-05-13 DIAGNOSIS — F33.1 MODERATE EPISODE OF RECURRENT MAJOR DEPRESSIVE DISORDER (HCC): ICD-10-CM

## 2021-05-13 DIAGNOSIS — F63.81 INTERMITTENT EXPLOSIVE DISORDER IN ADULT: ICD-10-CM

## 2021-05-13 RX ORDER — SERTRALINE HYDROCHLORIDE 100 MG/1
TABLET, FILM COATED ORAL
Qty: 45 TABLET | Refills: 0 | Status: SHIPPED | OUTPATIENT
Start: 2021-05-13 | End: 2021-06-08

## 2021-05-13 RX ORDER — RISPERIDONE 0.5 MG/1
TABLET, FILM COATED ORAL
Qty: 60 TABLET | Refills: 0 | Status: SHIPPED | OUTPATIENT
Start: 2021-05-13 | End: 2021-06-08

## 2021-05-26 ASSESSMENT — PATIENT HEALTH QUESTIONNAIRE - PHQ9
SUM OF ALL RESPONSES TO PHQ9 QUESTIONS 1 & 2: 4
SUM OF ALL RESPONSES TO PHQ QUESTIONS 1-9: 12
SUM OF ALL RESPONSES TO PHQ QUESTIONS 1-9: 13
3. TROUBLE FALLING OR STAYING ASLEEP: 1
5. POOR APPETITE OR OVEREATING: 2
7. TROUBLE CONCENTRATING ON THINGS, SUCH AS READING THE NEWSPAPER OR WATCHING TELEVISION: 1
1. LITTLE INTEREST OR PLEASURE IN DOING THINGS: 2
SUM OF ALL RESPONSES TO PHQ QUESTIONS 1-9: 13

## 2021-05-26 ASSESSMENT — COLUMBIA-SUICIDE SEVERITY RATING SCALE - C-SSRS
2. HAVE YOU ACTUALLY HAD ANY THOUGHTS OF KILLING YOURSELF?: NO
1. WITHIN THE PAST MONTH, HAVE YOU WISHED YOU WERE DEAD OR WISHED YOU COULD GO TO SLEEP AND NOT WAKE UP?: YES
6. HAVE YOU EVER DONE ANYTHING, STARTED TO DO ANYTHING, OR PREPARED TO DO ANYTHING TO END YOUR LIFE?: NO

## 2021-05-26 NOTE — PROGRESS NOTES
Visit Information    Have you changed or started any medications since your last visit including any over-the-counter medicines, vitamins, or herbal medicines? no   Are you having any side effects from any of your medications? -  no  Have you stopped taking any of your medications? Is so, why? -  no    Have you seen any other physician or provider since your last visit? No  Have you had any other diagnostic tests since your last visit? No  Have you been seen in the emergency room and/or had an admission to a hospital since we last saw you? No  Have you had your routine dental cleaning in the past 6 months? yes -    Have you activated your [facility] account? If not, what are your barriers? Yes     Patient Care Team:  Rosalind Conti MD as PCP - General (Family Medicine)  Rosalind Conti MD as PCP [facility]  Arie Mireles, PhD (Psychology)  STEPHON Sawyer - NP (Psychiatry)    Medical History Review  Past Medical, Family, and Social History reviewed and does contribute to the patient presenting condition    Health Maintenance   Topic Date Due    Hepatitis C screen  Never done    Diabetic foot exam  Never done    Diabetic retinal exam  Never done    COVID-19 Vaccine (1) Never done    Hepatitis B vaccine (1 of 3 - Risk 3-dose series) Never done    Diabetic microalbuminuria test  03/09/2021    Flu vaccine (Season Ended) 09/01/2021    A1C test (Diabetic or Prediabetic)  12/02/2021    Lipid screen  12/02/2021    Potassium monitoring  02/04/2022    Creatinine monitoring  02/04/2022    DTaP/Tdap/Td vaccine (2 - Td) 12/28/2026    Pneumococcal 0-64 years Vaccine (2 of 2) 08/06/2045    HIV screen  Completed    Hepatitis A vaccine  Aged Out    Hib vaccine  Aged Out    Meningococcal (ACWY) vaccine  Aged Out    Varicella vaccine  Discontinued     Chief Complaint   Patient presents with    Other     refused to advise what visit was for.

## 2021-05-27 ENCOUNTER — TELEMEDICINE (OUTPATIENT)
Dept: FAMILY MEDICINE CLINIC | Age: 41
End: 2021-05-27
Payer: COMMERCIAL

## 2021-05-27 DIAGNOSIS — F33.9 MAJOR DEPRESSIVE DISORDER, RECURRENT EPISODE WITH ANXIOUS DISTRESS (HCC): ICD-10-CM

## 2021-05-27 DIAGNOSIS — N52.9 ERECTILE DYSFUNCTION, UNSPECIFIED ERECTILE DYSFUNCTION TYPE: ICD-10-CM

## 2021-05-27 DIAGNOSIS — K58.0 IRRITABLE BOWEL SYNDROME WITH DIARRHEA: Primary | ICD-10-CM

## 2021-05-27 DIAGNOSIS — I10 ESSENTIAL HYPERTENSION: ICD-10-CM

## 2021-05-27 DIAGNOSIS — Z86.16 HISTORY OF 2019 NOVEL CORONAVIRUS DISEASE (COVID-19): ICD-10-CM

## 2021-05-27 DIAGNOSIS — E78.5 HYPERLIPIDEMIA WITH TARGET LDL LESS THAN 100: ICD-10-CM

## 2021-05-27 DIAGNOSIS — F43.10 POST TRAUMATIC STRESS DISORDER (PTSD): ICD-10-CM

## 2021-05-27 DIAGNOSIS — E11.65 TYPE 2 DIABETES MELLITUS WITH HYPERGLYCEMIA, WITHOUT LONG-TERM CURRENT USE OF INSULIN (HCC): ICD-10-CM

## 2021-05-27 DIAGNOSIS — Z11.59 ENCOUNTER FOR SCREENING FOR OTHER VIRAL DISEASES: ICD-10-CM

## 2021-05-27 PROCEDURE — 3046F HEMOGLOBIN A1C LEVEL >9.0%: CPT | Performed by: FAMILY MEDICINE

## 2021-05-27 PROCEDURE — 2022F DILAT RTA XM EVC RTNOPTHY: CPT | Performed by: FAMILY MEDICINE

## 2021-05-27 PROCEDURE — 99214 OFFICE O/P EST MOD 30 MIN: CPT | Performed by: FAMILY MEDICINE

## 2021-05-27 PROCEDURE — G8427 DOCREV CUR MEDS BY ELIG CLIN: HCPCS | Performed by: FAMILY MEDICINE

## 2021-05-27 RX ORDER — LOPERAMIDE HYDROCHLORIDE 2 MG/1
2 CAPSULE ORAL 4 TIMES DAILY PRN
Qty: 30 CAPSULE | Refills: 0 | Status: SHIPPED | OUTPATIENT
Start: 2021-05-27 | End: 2021-06-01

## 2021-05-27 RX ORDER — TADALAFIL 5 MG/1
5 TABLET ORAL DAILY PRN
Qty: 30 TABLET | Refills: 5 | Status: SHIPPED | OUTPATIENT
Start: 2021-05-27 | End: 2021-11-20 | Stop reason: ALTCHOICE

## 2021-05-27 RX ORDER — ONDANSETRON 4 MG/1
4 TABLET, FILM COATED ORAL EVERY 6 HOURS PRN
Qty: 24 TABLET | Refills: 0 | Status: SHIPPED | OUTPATIENT
Start: 2021-05-27 | End: 2021-06-02

## 2021-05-27 ASSESSMENT — ENCOUNTER SYMPTOMS
SHORTNESS OF BREATH: 0
DIARRHEA: 1
CHEST TIGHTNESS: 0
VOMITING: 1
CONSTIPATION: 0
NAUSEA: 1
COUGH: 0
WHEEZING: 0
ABDOMINAL DISTENTION: 0
ABDOMINAL PAIN: 0

## 2021-05-27 ASSESSMENT — ANXIETY QUESTIONNAIRES
5. BEING SO RESTLESS THAT IT IS HARD TO SIT STILL: 2-OVER HALF THE DAYS
4. TROUBLE RELAXING: 2-OVER HALF THE DAYS
GAD7 TOTAL SCORE: 19
2. NOT BEING ABLE TO STOP OR CONTROL WORRYING: 3-NEARLY EVERY DAY
3. WORRYING TOO MUCH ABOUT DIFFERENT THINGS: 3-NEARLY EVERY DAY
6. BECOMING EASILY ANNOYED OR IRRITABLE: 3-NEARLY EVERY DAY

## 2021-05-27 NOTE — PATIENT INSTRUCTIONS
Patient Education        Learning About Carbohydrate (Carb) Counting and Eating Out When You Have Diabetes  Why plan your meals? Meal planning can be a key part of managing diabetes. Planning meals and snacks with the right balance of carbohydrate, protein, and fat can help you keep your blood sugar at the target level you set with your doctor. You don't have to eat special foods. You can eat what your family eats, including sweets once in a while. But you do have to pay attention to how often you eat and how much you eat of certain foods. You may want to work with a dietitian or a certified diabetes educator. He or she can give you tips and meal ideas and can answer your questions about meal planning. This health professional can also help you reach a healthy weight if that is one of your goals. What should you know about eating carbs? Managing the amount of carbohydrate (carbs) you eat is an important part of healthy meals when you have diabetes. Carbohydrate is found in many foods. · Learn which foods have carbs. And learn the amounts of carbs in different foods. ? Bread, cereal, pasta, and rice have about 15 grams of carbs in a serving. A serving is 1 slice of bread (1 ounce), ½ cup of cooked cereal, or 1/3 cup of cooked pasta or rice. ? Fruits have 15 grams of carbs in a serving. A serving is 1 small fresh fruit, such as an apple or orange; ½ of a banana; ½ cup of cooked or canned fruit; ½ cup of fruit juice; 1 cup of melon or raspberries; or 2 tablespoons of dried fruit. ? Milk and no-sugar-added yogurt have 15 grams of carbs in a serving. A serving is 1 cup of milk or 2/3 cup of no-sugar-added yogurt. ? Starchy vegetables have 15 grams of carbs in a serving. A serving is ½ cup of mashed potatoes or sweet potato; 1 cup winter squash; ½ of a small baked potato; ½ cup of cooked beans; or ½ cup cooked corn or green peas.   · Learn how much carbs to eat each day and at each meal. A dietitian or CDE can teach you how to keep track of the amount of carbs you eat. This is called carbohydrate counting. · If you are not sure how to count carbohydrate grams, use the Plate Method to plan meals. It is a good, quick way to make sure that you have a balanced meal. It also helps you spread carbs throughout the day. ? Divide your plate by types of foods. Put non-starchy vegetables on half the plate, meat or other protein food on one-quarter of the plate, and a grain or starchy vegetable in the final quarter of the plate. To this you can add a small piece of fruit and 1 cup of milk or yogurt, depending on how many carbs you are supposed to eat at a meal.  · Try to eat about the same amount of carbs at each meal. Do not \"save up\" your daily allowance of carbs to eat at one meal.  · Proteins have very little or no carbs per serving. Examples of proteins are beef, chicken, turkey, fish, eggs, tofu, cheese, cottage cheese, and peanut butter. A serving size of meat is 3 ounces, which is about the size of a deck of cards. Examples of meat substitute serving sizes (equal to 1 ounce of meat) are 1/4 cup of cottage cheese, 1 egg, 1 tablespoon of peanut butter, and ½ cup of tofu. How can you eat out and still eat healthy? · Learn to estimate the serving sizes of foods that have carbohydrate. If you measure food at home, it will be easier to estimate the amount in a serving of restaurant food. · If the meal you order has too much carbohydrate (such as potatoes, corn, or baked beans), ask to have a low-carbohydrate food instead. Ask for a salad or green vegetables. · If you use insulin, check your blood sugar before and after eating out to help you plan how much to eat in the future. · If you eat more carbohydrate at a meal than you had planned, take a walk or do other exercise. This will help lower your blood sugar. What are some tips for eating healthy? · Limit saturated fat, such as the fat from meat and dairy products.  This

## 2021-05-27 NOTE — PROGRESS NOTES
2021    TELEHEALTH EVALUATION -- Audio/Visual (During [facility]-01 public health emergency)      Priya Maldonado (:  1980) is a 36 y.o. male,Established patient, here for evaluation of the following chief complaint(s): Anxiety (refused to advise what visit was for.), Depression, and GI Problem        ASSESSMENT/PLAN:    1. Irritable bowel syndrome with diarrhea  Failing to change as expected. Advise him to avoid pop and Metformin the day he is planning to get out  If symptoms not improve, then he will need GI work-up which he declines at this time  -start     ondansetron (ZOFRAN) 4 MG tablet; Take 1 tablet by mouth every 6 hours as needed for Nausea or Vomiting, Disp-24 tablet, R-0Normal  -  start   loperamide (IMODIUM) 2 MG capsule; Take 1 capsule by mouth 4 times daily as needed for Diarrhea, Disp-30 capsule, R-0Normal  2. Major depressive disorder, recurrent episode with anxious distress [facility])  Improving  Continue current treatment and follow up with psychiatrist and psychologist as scheduled. 3. Type 2 diabetes mellitus with hyperglycemia, without long-term current use of insulin (Formerly [facility])  improving  Lab Results   Component Value Date    LABA1C 6.5 (H) 2020    LABA1C 11.3 2020    LABA1C 5.8 2019       -     CBC; Future  -     Comprehensive Metabolic Panel; Future  -     TSH without Reflex; Future  -     Vitamin B12 & Folate; Future  -     Microalbumin / Creatinine Urine Ratio; Future  -     Hemoglobin A1C; Future  -advised home blood glucose testing  daily  -daily feet exam, Foot care: advised to wash feet daily, pat dry and apply lotion at night, avoiding between toes.  Need to look at feet daily and report to a physician any signs of inflammation or skin damage  -annual dilated eye exam  -Low carb, low fat diet, increase fruits and vegetables, and exercise 4-5 times a day 30-40 minutes a day discussed  -continue current treatment  -continue Aspirin  -continue ACEI and statin 4. Essential hypertension  Well controlled. Continue current treatment. Will recheck labs. Discussed low salt diet and BP and pulse monitoring.  -     CBC; Future  -     Comprehensive Metabolic Panel; Future  5. Erectile dysfunction, unspecified erectile dysfunction type  -Failing to change as expected. tadalafil (CIALIS) 5 MG tablet; Take 1 tablet by mouth daily as needed for Erectile Dysfunction, Disp-30 tablet, R-5Normal  6. Hyperlipidemia with target LDL less than 100  Well controlled. Continue current treatment. Lipitor  Will recheck labs. Lab Results   Component Value Date    LDLCHOLESTEROL 53 12/02/2020       -     Lipid Panel; Future  7. Encounter for screening for other viral diseases  -     Hepatitis C Antibody; Future  8. Post traumatic stress disorder (PTSD)  worsening    Continue current treatment and follow up with psychiatrist and psychologist as scheduled. 9. History of 2019 novel coronavirus disease (COVID-19)  Advised to get COVID-19 vaccine      Lizy Baker received counseling on the following healthy behaviors: nutrition, exercise, medication adherence and tobacco cessation  Reviewed prior labs and health maintenance  Discussed use, benefit, and side effects of prescribed medications. Barriers to medication compliance addressed. Patient given educational materials - see patient instructions  All patient questions answered. Patient voiced understanding. The patient's past medical,surgical, social, and family history as well as his current medications and allergies were reviewed as documented in today's encounter. Medications, labs, diagnostic studies, consultations and follow-up as documented in this encounter. Return in about 3 months (around 8/27/2021) for ALWAYS NEEDS 30 MIN. APPT, depression-DO PHQ-9 in EPIC, anxiety-DO APARNA 7 in EPIC, DM2, HTN, HLP.     Data Unavailable    Future Appointments   Date Time Provider Sejal Romero   8/27/2021  2:00 PM [facility]   [facility]21  4:00 PM [facility]         SUBJECTIVE/OBJECTIVE:  Essie Berumen (:  1980) has requested an audio/video evaluation for the following concern(s): Anxiety (refused to advise what visit was for.), Depression, and GI Problem      Patient reports having bouts of diarrhea, nausea and vomiting  about leaving the house, has to do it 2-3 times in a row before being able to leave the house. He thinks it is due to anxiety of getting out of the house. Doesn't want to leave the house. Had COVID 19 a few weeks ago. Denies shortness of breath or cough. Has Depression and anxiety. He feels PTSD worsening  Worse since father , he says his father had ESKD and MI    Seeing Randel Blizzard, who is adjusting his medications     PHQ-2 Over the past 2 weeks, how often have you been bothered by any of the following problems? Little interest or pleasure in doing things: More than half the days  Feeling down, depressed, or hopeless: More than half the days  PHQ-2 Score: 4  PHQ-9 Over the past 2 weeks, how often have you been bothered by any of the following problems? Trouble falling or staying asleep, or sleeping too much: Several days  Feeling tired or having little energy: Several days  Poor appetite or overeating: More than half the days  Feeling bad about yourself - or that you are a failure or have let yourself or your family down: Nearly every day  Trouble concentrating on things, such as reading the newspaper or watching television: Several days  Moving or speaking so slowly that other people could have noticed.  Or the opposite - being so fidgety or restless that you have been moving around a lot more than usual: Not at all  Thoughts that you would be better off dead, or of hurting yourself in some way: Several days  If you checked off any problems, how difficult have these problems made it for you to do your work, take care of things at home, or get along with other people?: Very difficult  PHQ-9 Total Score: 13        AMB C-SSRS Suicide Screening     1) Within the past month, have you wished you were dead or wished you could go to sleep and not wake up? YES   2) Have you actually had any thoughts of killing yourself? NO   6) Have you ever done anything, started to do anything, or prepared to do anything to end your life? NO       Moderate depression    PHQ Scores 5/26/2021 4/5/2021 2/17/2021 2/2/2021 12/1/2020 10/18/2018 3/29/2018   PHQ2 Score 4 3 4 3 4 6 4   PHQ9 Score 13 16 19 17 23 27 21       Severe anxiety, same as before    APARNA-7 SCREENING 5/27/2021 2/2/2021 10/18/2018 3/29/2018   Feeling nervous, anxious, or on edge 3-Nearly every day 3-Nearly every day 3-Nearly every day 3-Nearly every day   Not able to stop or control worrying 3-Nearly every day 3-Nearly every day 3-Nearly every day 3-Nearly every day   Worrying too much about different things 3-Nearly every day 3-Nearly every day 3-Nearly every day 3-Nearly every day   Trouble relaxing 2-Over half the days 3-Nearly every day 3-Nearly every day 3-Nearly every day   Being so restless that it's hard to sit still 2-Over half the days 3-Nearly every day 3-Nearly every day 2-Over half the days   Becoming easily annoyed or irritable 3-Nearly every day 3-Nearly every day 3-Nearly every day 3-Nearly every day   Feeling afraid as if something awful might happen 3-Nearly every day 3-Nearly every day 3-Nearly every day 3-Nearly every day   APARNA-7 Total Score 19 21 21 20     Diabetes mellitus type 2  Home Blood Glucose 120-130  Reports erectile dysfunction and would like something for it  Still drinking some pop but diet pop, taking Metformin    Lab Results   Component Value Date    LABA1C 6.5 (H) 12/02/2020    LABA1C 11.3 03/09/2020    LABA1C 5.8 05/02/2019       Hypertension:    he  is not exercising and is adherent to low salt diet. Blood pressure is well controlled at home. Cardiac symptoms fatigue.  Patient denies chest pain, chest pressure/discomfort, claudication, dyspnea, exertional chest pressure/discomfort, irregular heart beat, lower extremity edema, near-syncope, orthopnea, palpitations, paroxysmal nocturnal dyspnea, syncope and tachypnea. Cardiovascular risk factors: diabetes mellitus, dyslipidemia, hypertension, male gender, obesity (BMI >= 30 kg/m2) and smoking/ tobacco exposure. Use of agents associated with hypertension: none. History of target organ damage: none. EKG 2/4/21 is within normal limits     BP controlled 130/80. Leesa Puentes reports compliance with BP medications, and tolerates them well, denies side effects. BP Readings from Last 3 Encounters:   02/05/21 123/61   12/16/20 134/86   03/09/20 (!) 138/101              Hyperlipidemia:  No new myalgias or GI upset on atorvastatin (Lipitor). Medication compliance: compliant all of the time. Patient is  following a low fat, low cholesterol diet. LDL is normal  Lab Results   Component Value Date    LDLCHOLESTEROL 53 12/02/2020     Lab Results   Component Value Date    TRIG 98 05/03/2019    TRIG 139 08/06/2018    TRIG 180 (H) 05/31/2017       Patient is due for hepatitis C screening. Leesa Puentes 's indication is CDC recommendation. Review of Systems   Constitutional: Positive for fatigue. Negative for activity change, appetite change, chills, diaphoresis, fever and unexpected weight change. Respiratory: Negative for cough, chest tightness, shortness of breath and wheezing. Cardiovascular: Negative for chest pain, palpitations and leg swelling. Gastrointestinal: Positive for diarrhea, nausea and vomiting. Negative for abdominal distention, abdominal pain and constipation. Endocrine: Negative for cold intolerance, heat intolerance, polydipsia, polyphagia and polyuria. Genitourinary:        Erectile  dysfunction   Psychiatric/Behavioral: Positive for decreased concentration, dysphoric mood and sleep disturbance.  Negative for self-injury and suicidal ideas. The patient is nervous/anxious. Prior to Visit Medications    Medication Sig Taking? Authorizing Provider   risperiDONE (RISPERDAL) 0.5 MG tablet TAKE ONE TABLET BY MOUTH TWICE A DAY Yes STEPHON Daily NP   sertraline (ZOLOFT) 100 MG tablet TAKE 1 AND 1/2 TABLETS BY MOUTH DAILY Yes STEPHON Daily NP   prazosin (MINIPRESS) 1 MG capsule Take 1 capsule by mouth nightly Yes STEPHON Daily NP   LANTUS SOLOSTAR 100 UNIT/ML injection pen INJECT 10 UNITS UNDER THE SKIN ONCE DAILY AS DIRECTED Yes Germaine Denis MD   famotidine (PEPCID) 20 MG tablet TAKE ONE TABLET BY MOUTH TWICE A DAY Yes Germaine Denis MD   hydrOXYzine (ATARAX) 25 MG tablet Take 1 tablet by mouth 3 times daily as needed for Anxiety Yes STEPHON Daily NP   traZODone (DESYREL) 50 MG tablet Take 0.5 tablets by mouth nightly as needed for Sleep Yes STEPHON Daily NP   atenolol (TENORMIN) 100 MG tablet Take 1 tablet by mouth daily Dose increased 2/2/2021.for HTN and anxiety. Keep BP bellow 1135/85 and above 115/65, and pulse 56-80 Yes Germaine Denis MD   metFORMIN (GLUCOPHAGE) 1000 MG tablet Take 1 tablet by mouth 2 times daily (with meals) Yes Germaine Denis MD   atorvastatin (LIPITOR) 10 MG tablet Take 1 tablet by mouth nightly Yes Germaine Denis MD   aspirin EC 81 MG EC tablet Take 1 tablet by mouth daily Yes Germaine Denis MD   lisinopril (PRINIVIL;ZESTRIL) 40 MG tablet Take 1 tablet by mouth daily Yes Germaine Denis MD   hydroCHLOROthiazide (HYDRODIURIL) 50 MG tablet Take 1 tablet by mouth daily Yes Germaine Denis MD   potassium chloride (KLOR-CON M) 10 MEQ extended release tablet Take 1 tablet by mouth daily Yes MD DOMENIC Jackson PENTIPS 31G X 6 MM MISC Inject 1 each into the skin daily Yes Germaine Denis MD   blood glucose monitor strips Test 2-3 times a day & as needed for symptoms of irregular blood glucose.  Yes Jennifer rBuno MD   Alcohol Swabs (ALCOHOL PREP) PADS Use as directed Yes Jennifer Bruno MD   Lancets MISC 1 each by Does not apply route 2 times daily Yes Jennifer Bruno MD   Blood Pressure Monitor KIT Use as directed. Yes STEPHON Lantigua CNP   glucose monitoring kit (FREESTYLE) monitoring kit Use as directed Yes STEPHON Lantigua CNP       Social History     Tobacco Use    Smoking status: Former Smoker     Packs/day: 1.25     Years: 23.00     Pack years: 28.75     Types: Cigarettes     Quit date: 3/26/2017     Years since quittin.1    Smokeless tobacco: Never Used    Tobacco comment: Quit smoking cold turkey   Vaping Use    Vaping Use: Never used   Substance Use Topics    Alcohol use: No     Alcohol/week: 0.0 standard drinks    Drug use: No          PHYSICAL EXAMINATION:    Vital Signs: (As obtained by patient/caregiver or practitioner observation)  -vital signs stable and within normal limits except Morbid obesity per BMI. Patient-Reported Vitals 2021   Patient-Reported Weight 473 lbs   Patient-Reported Height 6 ft 10 in   Patient-Reported Systolic 795   Patient-Reported Diastolic 80   Patient-Reported Pulse -               Constitutional: [x] Appears well-developed and well-nourished [x] No apparent distress      [x] Abnormal- obese       Mental status  [x] Alert and awake  [x] Oriented to person/place/time [x]Able to follow commands      Eyes:  EOM    [x]  Normal  [] Abnormal-  Sclera  [x]  Normal  [] Abnormal -         Discharge [x]  None visible  [] Abnormal -    HENT:   [x] Normocephalic, atraumatic.   [] Abnormal   [x] Mouth/Throat: Mucous membranes are moist.     External Ears [x] Normal  [] Abnormal-     Neck: [x] No visualized mass     Pulmonary/Chest: [x] Respiratory effort normal.  [x] No visualized signs of difficulty breathing or respiratory distress        [] Abnormal        Musculoskeletal:   [x] Normal gait with no signs of ataxia         [x] Normal 12/02/2020    LDLCHOLESTEROL 55 05/03/2019    LDLCHOLESTEROL 63 08/06/2018       Lab Results   Component Value Date    CHOLHDLRATIO 2.6 12/02/2020    CHOLHDLRATIO 2.7 05/03/2019    CHOLHDLRATIO 3.2 08/06/2018       Lab Results   Component Value Date    LABA1C 6.5 (H) 12/02/2020    LABA1C 11.3 03/09/2020    LABA1C 5.8 05/02/2019         Lab Results   Component Value Date    [id number] 476 12/02/2020       No results found for: VITD25    Orders Placed This Encounter   Medications    ondansetron (ZOFRAN) 4 MG tablet     Sig: Take 1 tablet by mouth every 6 hours as needed for Nausea or Vomiting     Dispense:  24 tablet     Refill:  0    loperamide (IMODIUM) 2 MG capsule     Sig: Take 1 capsule by mouth 4 times daily as needed for Diarrhea     Dispense:  30 capsule     Refill:  0    tadalafil (CIALIS) 5 MG tablet     Sig: Take 1 tablet by mouth daily as needed for Erectile Dysfunction     Dispense:  30 tablet     Refill:  5       Orders Placed This Encounter   Procedures    CBC     Standing Status:   Future     Standing Expiration Date:   7/10/2021    Comprehensive Metabolic Panel     Standing Status:   Future     Standing Expiration Date:   7/10/2021    Lipid Panel     Standing Status:   Future     Standing Expiration Date:   7/10/2021     Order Specific Question:   Is Patient Fasting?/# of Hours     Answer:   8-10 Hours, water ok to drink    TSH without Reflex     Standing Status:   Future     Standing Expiration Date:   7/10/2021    Vitamin B12 & Folate     Standing Status:   Future     Standing Expiration Date:   7/10/2021    Microalbumin / Creatinine Urine Ratio     Standing Status:   Future     Standing Expiration Date:   7/10/2021    Hemoglobin A1C     Standing Status:   Future     Standing Expiration Date:   7/10/2021    Hepatitis C Antibody     Standing Status:   Future     Standing Expiration Date:   7/10/2021       There are no discontinued medications.            Marah Cochran, was evaluated

## 2021-05-31 DIAGNOSIS — K58.0 IRRITABLE BOWEL SYNDROME WITH DIARRHEA: ICD-10-CM

## 2021-06-01 RX ORDER — LOPERAMIDE HYDROCHLORIDE 2 MG/1
CAPSULE ORAL
Qty: 60 CAPSULE | Refills: 3 | Status: SHIPPED | OUTPATIENT
Start: 2021-06-01 | End: 2021-07-26

## 2021-06-05 DIAGNOSIS — F33.2 SEVERE EPISODE OF RECURRENT MAJOR DEPRESSIVE DISORDER, WITHOUT PSYCHOTIC FEATURES (HCC): ICD-10-CM

## 2021-06-05 DIAGNOSIS — E11.65 TYPE 2 DIABETES MELLITUS WITH HYPERGLYCEMIA, WITHOUT LONG-TERM CURRENT USE OF INSULIN (HCC): ICD-10-CM

## 2021-06-07 RX ORDER — BUPROPION HYDROCHLORIDE 150 MG/1
TABLET, EXTENDED RELEASE ORAL
Qty: 60 TABLET | Refills: 4 | OUTPATIENT
Start: 2021-06-07

## 2021-06-08 DIAGNOSIS — F33.1 MODERATE EPISODE OF RECURRENT MAJOR DEPRESSIVE DISORDER (HCC): ICD-10-CM

## 2021-06-08 DIAGNOSIS — F63.81 INTERMITTENT EXPLOSIVE DISORDER IN ADULT: ICD-10-CM

## 2021-06-08 RX ORDER — RISPERIDONE 0.5 MG/1
TABLET, FILM COATED ORAL
Qty: 60 TABLET | Refills: 0 | Status: SHIPPED | OUTPATIENT
Start: 2021-06-08 | End: 2021-07-09

## 2021-06-08 RX ORDER — SERTRALINE HYDROCHLORIDE 100 MG/1
TABLET, FILM COATED ORAL
Qty: 45 TABLET | Refills: 0 | Status: SHIPPED | OUTPATIENT
Start: 2021-06-08 | End: 2021-07-09

## 2021-06-12 DIAGNOSIS — F63.81 INTERMITTENT EXPLOSIVE DISORDER IN ADULT: ICD-10-CM

## 2021-06-14 RX ORDER — PRAZOSIN HYDROCHLORIDE 1 MG/1
CAPSULE ORAL
Qty: 30 CAPSULE | Refills: 0 | Status: SHIPPED | OUTPATIENT
Start: 2021-06-14 | End: 2021-07-12

## 2021-06-16 ENCOUNTER — HOSPITAL ENCOUNTER (OUTPATIENT)
Age: 41
Discharge: HOME | End: 2021-06-16
Payer: COMMERCIAL

## 2021-06-16 DIAGNOSIS — I10 ESSENTIAL HYPERTENSION: ICD-10-CM

## 2021-06-16 DIAGNOSIS — E11.65 TYPE 2 DIABETES MELLITUS WITH HYPERGLYCEMIA, WITHOUT LONG-TERM CURRENT USE OF INSULIN (HCC): ICD-10-CM

## 2021-06-16 DIAGNOSIS — E78.5 HYPERLIPIDEMIA WITH TARGET LDL LESS THAN 100: ICD-10-CM

## 2021-06-16 DIAGNOSIS — Z11.59 ENCOUNTER FOR SCREENING FOR OTHER VIRAL DISEASES: ICD-10-CM

## 2021-06-16 LAB
ALBUMIN SERPL-MCNC: 4.4 G/DL (ref 3.5–5.2)
ALBUMIN/GLOBULIN RATIO: 1.3 (ref 1–2.5)
ALP BLD-CCNC: 63 U/L (ref 40–129)
ALT SERPL-CCNC: 20 U/L (ref 5–41)
ANION GAP SERPL CALCULATED.3IONS-SCNC: 6 MMOL/L (ref 9–17)
AST SERPL-CCNC: 18 U/L
BILIRUB SERPL-MCNC: 0.46 MG/DL (ref 0.3–1.2)
BUN BLDV-MCNC: 22 MG/DL (ref 6–20)
BUN/CREAT BLD: 29 (ref 9–20)
CALCIUM SERPL-MCNC: 9.7 MG/DL (ref 8.6–10.4)
CHLORIDE BLD-SCNC: 95 MMOL/L (ref 98–107)
CHOLESTEROL/HDL RATIO: 2.7
CHOLESTEROL: 130 MG/DL
CO2: 29 MMOL/L (ref 20–31)
CREAT SERPL-MCNC: 0.75 MG/DL (ref 0.7–1.2)
CREATININE URINE: 63.7 MG/DL (ref 39–259)
GFR AFRICAN AMERICAN: >60 ML/MIN
GFR NON-AFRICAN AMERICAN: >60 ML/MIN
GFR SERPL CREATININE-BSD FRML MDRD: ABNORMAL ML/MIN/{1.73_M2}
GFR SERPL CREATININE-BSD FRML MDRD: ABNORMAL ML/MIN/{1.73_M2}
GLUCOSE BLD-MCNC: 144 MG/DL (ref 70–99)
HCT VFR BLD CALC: 45.9 % (ref 40.7–50.3)
HDLC SERPL-MCNC: 49 MG/DL
HEMOGLOBIN: 14.5 G/DL (ref 13–17)
HEPATITIS C ANTIBODY: NONREACTIVE
LDL CHOLESTEROL: 58 MG/DL (ref 0–130)
MCH RBC QN AUTO: 27.7 PG (ref 25.2–33.5)
MCHC RBC AUTO-ENTMCNC: 31.6 G/DL (ref 28.4–34.8)
MCV RBC AUTO: 87.6 FL (ref 82.6–102.9)
MICROALBUMIN/CREAT 24H UR: <12 MG/L
MICROALBUMIN/CREAT UR-RTO: NORMAL MCG/MG CREAT
NRBC AUTOMATED: 0 PER 100 WBC
PDW BLD-RTO: 13.1 % (ref 11.8–14.4)
PLATELET # BLD: 219 K/UL (ref 138–453)
PMV BLD AUTO: 10.2 FL (ref 8.1–13.5)
POTASSIUM SERPL-SCNC: 4.6 MMOL/L (ref 3.7–5.3)
RBC # BLD: 5.24 M/UL (ref 4.21–5.77)
SODIUM BLD-SCNC: 130 MMOL/L (ref 135–144)
TOTAL PROTEIN: 7.9 G/DL (ref 6.4–8.3)
TRIGL SERPL-MCNC: 114 MG/DL
TSH SERPL DL<=0.05 MIU/L-ACNC: 2.65 MIU/L (ref 0.3–5)
VLDLC SERPL CALC-MCNC: NORMAL MG/DL (ref 1–30)
WBC # BLD: 10.3 K/UL (ref 3.5–11.3)

## 2021-06-16 PROCEDURE — 85027 COMPLETE CBC AUTOMATED: CPT

## 2021-06-16 PROCEDURE — 80061 LIPID PANEL: CPT

## 2021-06-16 PROCEDURE — 86803 HEPATITIS C AB TEST: CPT

## 2021-06-16 PROCEDURE — 84443 ASSAY THYROID STIM HORMONE: CPT

## 2021-06-16 PROCEDURE — 80053 COMPREHEN METABOLIC PANEL: CPT

## 2021-06-16 PROCEDURE — 82746 ASSAY OF FOLIC ACID SERUM: CPT

## 2021-06-16 PROCEDURE — 36415 COLL VENOUS BLD VENIPUNCTURE: CPT

## 2021-06-16 PROCEDURE — 82043 UR ALBUMIN QUANTITATIVE: CPT

## 2021-06-16 PROCEDURE — 82570 ASSAY OF URINE CREATININE: CPT

## 2021-06-16 PROCEDURE — 82607 VITAMIN B-12: CPT

## 2021-06-16 PROCEDURE — 83036 HEMOGLOBIN GLYCOSYLATED A1C: CPT

## 2021-06-17 DIAGNOSIS — F33.1 MODERATE EPISODE OF RECURRENT MAJOR DEPRESSIVE DISORDER (HCC): Primary | ICD-10-CM

## 2021-06-17 DIAGNOSIS — I10 ESSENTIAL HYPERTENSION: ICD-10-CM

## 2021-06-17 DIAGNOSIS — E87.1 HYPONATREMIA: Primary | ICD-10-CM

## 2021-06-17 LAB
ESTIMATED AVERAGE GLUCOSE: 120 MG/DL
FOLATE: 13.6 NG/ML
HBA1C MFR BLD: 5.8 % (ref 4–6)
VITAMIN B-12: 501 PG/ML (ref 232–1245)

## 2021-06-17 RX ORDER — BUPROPION HYDROCHLORIDE 150 MG/1
150 TABLET ORAL EVERY MORNING
Qty: 30 TABLET | Refills: 3 | Status: SHIPPED
Start: 2021-06-17 | End: 2021-06-17 | Stop reason: CLARIF

## 2021-06-17 RX ORDER — HYDROCHLOROTHIAZIDE 25 MG/1
25 TABLET ORAL DAILY
Qty: 90 TABLET | Refills: 3 | Status: SHIPPED
Start: 2021-06-17 | End: 2021-06-29 | Stop reason: ALTCHOICE

## 2021-06-17 RX ORDER — BUPROPION HYDROCHLORIDE 150 MG/1
150 TABLET, EXTENDED RELEASE ORAL 2 TIMES DAILY
Qty: 180 TABLET | Refills: 1 | OUTPATIENT
Start: 2021-06-17

## 2021-06-17 RX ORDER — BUPROPION HYDROCHLORIDE 150 MG/1
150 TABLET, EXTENDED RELEASE ORAL 2 TIMES DAILY
Qty: 60 TABLET | Refills: 3 | Status: SHIPPED | OUTPATIENT
Start: 2021-06-17 | End: 2021-10-20

## 2021-06-17 NOTE — TELEPHONE ENCOUNTER
Please verify with patient, Wellbutrin is not on his list, the psychiatrist might have taken him off    He is now on the Risperdal, Zoloft, prazosin hydroxyzine, trazodone    I do not see Wellbutrin    Current Outpatient Medications on File Prior to Visit   Medication Sig Dispense Refill    prazosin (MINIPRESS) 1 MG capsule TAKE ONE CAPSULE BY MOUTH ONCE NIGHTLY 30 capsule 0    risperiDONE (RISPERDAL) 0.5 MG tablet TAKE ONE TABLET BY MOUTH TWICE A DAY 60 tablet 0    sertraline (ZOLOFT) 100 MG tablet TAKE 1 AND 1/2 TABLET BY MOUTH DAILY 45 tablet 0    metFORMIN (GLUCOPHAGE) 1000 MG tablet TAKE ONE TABLET BY MOUTH TWICE A DAY WITH MEALS 180 tablet 4    loperamide (IMODIUM) 2 MG capsule TAKE ONE CAPSULE BY MOUTH FOUR TIMES A DAY AS NEEDED FOR DIARRHEA 60 capsule 3    tadalafil (CIALIS) 5 MG tablet Take 1 tablet by mouth daily as needed for Erectile Dysfunction 30 tablet 5    [DISCONTINUED] prazosin (MINIPRESS) 1 MG capsule Take 1 capsule by mouth nightly 30 capsule 0    LANTUS SOLOSTAR 100 UNIT/ML injection pen INJECT 10 UNITS UNDER THE SKIN ONCE DAILY AS DIRECTED 5 pen 2    famotidine (PEPCID) 20 MG tablet TAKE ONE TABLET BY MOUTH TWICE A DAY 60 tablet 5    hydrOXYzine (ATARAX) 25 MG tablet Take 1 tablet by mouth 3 times daily as needed for Anxiety 90 tablet 0    traZODone (DESYREL) 50 MG tablet Take 0.5 tablets by mouth nightly as needed for Sleep 15 tablet 0    atenolol (TENORMIN) 100 MG tablet Take 1 tablet by mouth daily Dose increased 2/2/2021.for HTN and anxiety.  Keep BP bellow 1135/85 and above 115/65, and pulse 56-80 90 tablet 3    atorvastatin (LIPITOR) 10 MG tablet Take 1 tablet by mouth nightly 90 tablet 3    aspirin EC 81 MG EC tablet Take 1 tablet by mouth daily 90 tablet 3    lisinopril (PRINIVIL;ZESTRIL) 40 MG tablet Take 1 tablet by mouth daily 90 tablet 3    hydroCHLOROthiazide (HYDRODIURIL) 50 MG tablet Take 1 tablet by mouth daily 90 tablet 3    potassium chloride (KLOR-CON M) 10 MEQ extended release tablet Take 1 tablet by mouth daily 90 tablet 3    UNIFINE PENTIPS 31G X 6 MM MISC Inject 1 each into the skin daily 100 each 3    blood glucose monitor strips Test 2-3 times a day & as needed for symptoms of irregular blood glucose. 100 strip 5    Alcohol Swabs (ALCOHOL PREP) PADS Use as directed 100 each 5    Lancets MISC 1 each by Does not apply route 2 times daily 300 each 1    Blood Pressure Monitor KIT Use as directed. 1 kit 1    glucose monitoring kit (FREESTYLE) monitoring kit Use as directed 1 kit 0     No current facility-administered medications on file prior to visit.

## 2021-06-17 NOTE — TELEPHONE ENCOUNTER
Please Approve or Refuse. Send to Pharmacy per Pt's Request:        Patient states his blood work is done and wants to know if he can get the refill. Please advise.       Next Visit Date:  8/27/2021   Last Visit Date: 5/27/2021    Hemoglobin A1C (%)   Date Value   06/16/2021 5.8   12/02/2020 6.5 (H)   03/09/2020 11.3             ( goal A1C is < 7)   BP Readings from Last 3 Encounters:   02/05/21 123/61   12/16/20 134/86   03/09/20 (!) 138/101          (goal 120/80)  BUN   Date Value Ref Range Status   06/16/2021 22 (H) 6 - 20 mg/dL Final     CREATININE   Date Value Ref Range Status   06/16/2021 0.75 0.70 - 1.20 mg/dL Final     Potassium   Date Value Ref Range Status   06/16/2021 4.6 3.7 - 5.3 mmol/L Final

## 2021-06-25 ENCOUNTER — HOSPITAL ENCOUNTER (OUTPATIENT)
Age: 41
Discharge: HOME | End: 2021-06-25
Payer: COMMERCIAL

## 2021-06-25 DIAGNOSIS — E87.1 HYPONATREMIA: ICD-10-CM

## 2021-06-25 DIAGNOSIS — I10 ESSENTIAL HYPERTENSION: ICD-10-CM

## 2021-06-25 LAB
ANION GAP SERPL CALCULATED.3IONS-SCNC: 8 MMOL/L (ref 9–17)
BUN BLDV-MCNC: 16 MG/DL (ref 6–20)
BUN/CREAT BLD: 18 (ref 9–20)
CALCIUM SERPL-MCNC: 9.9 MG/DL (ref 8.6–10.4)
CHLORIDE BLD-SCNC: 101 MMOL/L (ref 98–107)
CO2: 29 MMOL/L (ref 20–31)
CREAT SERPL-MCNC: 0.9 MG/DL (ref 0.7–1.2)
GFR AFRICAN AMERICAN: >60 ML/MIN
GFR NON-AFRICAN AMERICAN: >60 ML/MIN
GFR SERPL CREATININE-BSD FRML MDRD: ABNORMAL ML/MIN/{1.73_M2}
GFR SERPL CREATININE-BSD FRML MDRD: ABNORMAL ML/MIN/{1.73_M2}
GLUCOSE BLD-MCNC: 105 MG/DL (ref 70–99)
POTASSIUM SERPL-SCNC: 4.9 MMOL/L (ref 3.7–5.3)
SODIUM BLD-SCNC: 138 MMOL/L (ref 135–144)

## 2021-06-25 PROCEDURE — 36415 COLL VENOUS BLD VENIPUNCTURE: CPT

## 2021-06-25 PROCEDURE — 80048 BASIC METABOLIC PNL TOTAL CA: CPT

## 2021-06-29 ENCOUNTER — TELEPHONE (OUTPATIENT)
Dept: FAMILY MEDICINE CLINIC | Age: 41
End: 2021-06-29

## 2021-06-29 DIAGNOSIS — I10 ESSENTIAL HYPERTENSION: Primary | ICD-10-CM

## 2021-06-29 RX ORDER — FUROSEMIDE 20 MG/1
20 TABLET ORAL EVERY MORNING
Qty: 30 TABLET | Refills: 1 | Status: SHIPPED
Start: 2021-06-29 | End: 2021-07-13 | Stop reason: ALTCHOICE

## 2021-06-29 NOTE — TELEPHONE ENCOUNTER
We changed the pill due to prior low sodium  Is he eating salt?     Does he keep legs elevated    If he wants to change back to furosemide, we can try that but then will need labs in 1 week to recheck his sodium    Lab Results   Component Value Date     06/25/2021    K 4.9 06/25/2021     06/25/2021    CO2 29 06/25/2021    BUN 16 06/25/2021    CREATININE 0.90 06/25/2021    GLUCOSE 105 06/25/2021    CALCIUM 9.9 06/25/2021

## 2021-06-29 NOTE — TELEPHONE ENCOUNTER
Please let the patient know to  prescription from the pharmacy. BMP in 1 week      Orders Placed This Encounter   Medications    furosemide (LASIX) 20 MG tablet     Sig: Take 1 tablet by mouth every morning **stop HCTZ, stop sodium in diet**     Dispense:  30 tablet     Refill:  [id number]  [place] [address]  Phone: [phone] Fax: [phone]      Orders Placed This Encounter   Procedures    Basic Metabolic Panel     Standing Status:   Future     Standing Expiration Date:   6/29/2022       Future Appointments   Date Time Provider Sejal Romero   8/27/2021  2:00 PM Ruby Bella MD [facility]   11/18/2021  4:00 [facility]       Thank you!

## 2021-07-09 DIAGNOSIS — F33.1 MODERATE EPISODE OF RECURRENT MAJOR DEPRESSIVE DISORDER (HCC): ICD-10-CM

## 2021-07-09 DIAGNOSIS — F63.81 INTERMITTENT EXPLOSIVE DISORDER IN ADULT: ICD-10-CM

## 2021-07-09 RX ORDER — SERTRALINE HYDROCHLORIDE 100 MG/1
TABLET, FILM COATED ORAL
Qty: 45 TABLET | Refills: 0 | Status: SHIPPED | OUTPATIENT
Start: 2021-07-09 | End: 2021-08-04

## 2021-07-09 RX ORDER — RISPERIDONE 0.5 MG/1
TABLET, FILM COATED ORAL
Qty: 60 TABLET | Refills: 0 | Status: SHIPPED | OUTPATIENT
Start: 2021-07-09 | End: 2021-08-04

## 2021-07-12 DIAGNOSIS — F63.81 INTERMITTENT EXPLOSIVE DISORDER IN ADULT: ICD-10-CM

## 2021-07-12 RX ORDER — PRAZOSIN HYDROCHLORIDE 1 MG/1
CAPSULE ORAL
Qty: 30 CAPSULE | Refills: 0 | Status: SHIPPED | OUTPATIENT
Start: 2021-07-12 | End: 2021-08-09

## 2021-07-12 SDOH — ECONOMIC STABILITY: FOOD INSECURITY: WITHIN THE PAST 12 MONTHS, YOU WORRIED THAT YOUR FOOD WOULD RUN OUT BEFORE YOU GOT MONEY TO BUY MORE.: SOMETIMES TRUE

## 2021-07-12 SDOH — ECONOMIC STABILITY: FOOD INSECURITY: WITHIN THE PAST 12 MONTHS, THE FOOD YOU BOUGHT JUST DIDN'T LAST AND YOU DIDN'T HAVE MONEY TO GET MORE.: SOMETIMES TRUE

## 2021-07-12 ASSESSMENT — PATIENT HEALTH QUESTIONNAIRE - PHQ9
1. LITTLE INTEREST OR PLEASURE IN DOING THINGS: 1
SUM OF ALL RESPONSES TO PHQ9 QUESTIONS 1 & 2: 2
2. FEELING DOWN, DEPRESSED OR HOPELESS: 1
SUM OF ALL RESPONSES TO PHQ QUESTIONS 1-9: 2

## 2021-07-12 ASSESSMENT — SOCIAL DETERMINANTS OF HEALTH (SDOH): HOW HARD IS IT FOR YOU TO PAY FOR THE VERY BASICS LIKE FOOD, HOUSING, MEDICAL CARE, AND HEATING?: HARD

## 2021-07-12 NOTE — PROGRESS NOTES
Visit Information    Have you changed or started any medications since your last visit including any over-the-counter medicines, vitamins, or herbal medicines? no   Are you having any side effects from any of your medications? -  no  Have you stopped taking any of your medications? Is so, why? -  no    Have you seen any other physician or provider since your last visit? No  Have you had any other diagnostic tests since your last visit? No  Have you been seen in the emergency room and/or had an admission to a hospital since we last saw you? No  Have you had your routine dental cleaning in the past 6 months? no    Have you activated your [employer] account? If not, what are your barriers?  Yes     Patient Care Team:  Montse Chowdhury MD as PCP - General (Family Medicine)  Montse Chowdhury MD as PCP - [facility]  Troy Harris, PhD (Psychology)  STEPHON Reynoso - NP (Psychiatry)    Medical History Review  Past Medical, Family, and Social History reviewed and does contribute to the patient presenting condition    Health Maintenance   Topic Date Due    Diabetic foot exam  Never done    Diabetic retinal exam  Never done    COVID-19 Vaccine (1) Never done    Hepatitis B vaccine (1 of 3 - Risk 3-dose series) Never done    Flu vaccine (1) 09/01/2021    A1C test (Diabetic or Prediabetic)  06/16/2022    Diabetic microalbuminuria test  06/16/2022    Lipid screen  06/16/2022    Potassium monitoring  06/25/2022    Creatinine monitoring  06/25/2022    DTaP/Tdap/Td vaccine (2 - Td or Tdap) 12/28/2026    Pneumococcal 0-64 years Vaccine (2 of 2 - PPSV23) 08/06/2045    Hepatitis C screen  Completed    HIV screen  Completed    Hepatitis A vaccine  Aged Out    Hib vaccine  Aged Out    Meningococcal (ACWY) vaccine  Aged Out    Varicella vaccine  Discontinued

## 2021-07-13 ENCOUNTER — TELEPHONE (OUTPATIENT)
Dept: FAMILY MEDICINE CLINIC | Age: 41
End: 2021-07-13

## 2021-07-13 ENCOUNTER — TELEMEDICINE (OUTPATIENT)
Dept: FAMILY MEDICINE CLINIC | Age: 41
End: 2021-07-13
Payer: COMMERCIAL

## 2021-07-13 DIAGNOSIS — Z28.21 COVID-19 VACCINATION DECLINED: ICD-10-CM

## 2021-07-13 DIAGNOSIS — I10 ESSENTIAL HYPERTENSION: Primary | ICD-10-CM

## 2021-07-13 DIAGNOSIS — E11.65 TYPE 2 DIABETES MELLITUS WITH HYPERGLYCEMIA, WITHOUT LONG-TERM CURRENT USE OF INSULIN (HCC): ICD-10-CM

## 2021-07-13 PROCEDURE — 2022F DILAT RTA XM EVC RTNOPTHY: CPT | Performed by: FAMILY MEDICINE

## 2021-07-13 PROCEDURE — 99213 OFFICE O/P EST LOW 20 MIN: CPT | Performed by: FAMILY MEDICINE

## 2021-07-13 PROCEDURE — 3044F HG A1C LEVEL LT 7.0%: CPT | Performed by: FAMILY MEDICINE

## 2021-07-13 PROCEDURE — G8427 DOCREV CUR MEDS BY ELIG CLIN: HCPCS | Performed by: FAMILY MEDICINE

## 2021-07-13 RX ORDER — HYDRALAZINE HYDROCHLORIDE 10 MG/1
10 TABLET, FILM COATED ORAL 3 TIMES DAILY
Qty: 90 TABLET | Refills: 0 | Status: SHIPPED | OUTPATIENT
Start: 2021-07-13 | End: 2021-08-09

## 2021-07-13 RX ORDER — HYDROCHLOROTHIAZIDE 25 MG/1
25 TABLET ORAL DAILY
Qty: 90 TABLET | Refills: 3 | Status: SHIPPED
Start: 2021-07-13 | End: 2021-12-02 | Stop reason: SDUPTHER

## 2021-07-13 RX ORDER — BLOOD PRESSURE TEST KIT
KIT MISCELLANEOUS
Qty: 1 KIT | Refills: 0 | Status: SHIPPED | OUTPATIENT
Start: 2021-07-13 | End: 2022-05-24 | Stop reason: SDUPTHER

## 2021-07-13 ASSESSMENT — ENCOUNTER SYMPTOMS
DIARRHEA: 0
ABDOMINAL DISTENTION: 0
NAUSEA: 0
COUGH: 0
CONSTIPATION: 0
ABDOMINAL PAIN: 0
WHEEZING: 0
SHORTNESS OF BREATH: 1
CHEST TIGHTNESS: 0
VOMITING: 0

## 2021-07-13 NOTE — PROGRESS NOTES
2021    TELEHEALTH EVALUATION -- Audio/Visual (During HOLDB-86 public health emergency)      Laura Johnson (:  1980) is a 36 y.o. male,Established patient, here for evaluation of the following chief complaint(s): Hypertension (elevated)        ASSESSMENT/PLAN:    1. Essential hypertension  Inadequately controlled    -     Blood Pressure KIT; Disp-1 kit, R-0, PrintDiagnosis: HTN. Needs to check blood pressure 1-2 times a day until stable, then once a day. Goal blood pressure less than 135/85, and above 110/60.  -    Continue  hydroCHLOROthiazide (HYDRODIURIL) 25 MG tablet; Take 1 tablet by mouth daily **ignore RX for Furosemide**, Disp-90 tablet, R-3Normal  -  Start    hydrALAZINE (APRESOLINE) 10 MG tablet; Take 1 tablet by mouth 3 times daily Keep BP below 135/85 and above 115/65., Disp-90 tablet, R-0Normal  -     Basic Metabolic Panel; Future-recheck in 1 month    Patient to continue atenolol 100 mg, lisinopril 40 mg, lisinopril 10 MEQ    Discussed low salt diet and BP and pulse monitoring. 2. Type 2 diabetes mellitus with hyperglycemia, without long-term current use of insulin (HCC)  Improved  Continue Metformin 1000 mg twice a day  Okay to stop Lantus    Lab Results   Component Value Date    LABA1C 5.8 2021    LABA1C 6.5 (H) 2020    LABA1C 11.3 2020       Continue Metformin  Advised to contact us if the blood glucose above 140 and will add SGLT 2 inhibitor    I advised Laura Johnson to make appointment with ophthalmology. The patient verbalizes understanding and agrees with the plan. 3. COVID-19 vaccination declined  Patient declines despite counseling    Kaden Francis received counseling on the following healthy behaviors: nutrition, exercise, medication adherence and tobacco cessation  Reviewed prior labs and health maintenance  Discussed use, benefit, and side effects of prescribed medications. Barriers to medication compliance addressed.    Patient given educational materials - see patient instructions  All patient questions answered. Patient voiced understanding. The patient's past medical,surgical, social, and family history as well as his current medications and allergies were reviewed as documented in today's encounter. Medications, labs, diagnostic studies, consultations and follow-up as documented in this encounter. Return for KEEP APPT. Please fax BP machine [facility]  [address] Aydin Hernandez [address] [place] [address], [phone]    Future Appointments   Date Time Provider Sejal Romero   2021  2:00 PM Lauri Casas MD fp sc [facility]   [facility]21  4:00 [facility]         SUBJECTIVE/OBJECTIVE:  Jazmine Rodrigez (:  1980) has requested an audio/video evaluation for the following concern(s):Hypertension (elevated)        Hypertension:    he  is not exercising , but staying active, and is adherent to low salt diet. Blood pressure is not well controlled at home. Patient says he has a 11year-old machine, and he is afraid is not working well anymore     Cardiac symptoms dyspnea, fatigue and palpitations. Patient denies chest pain, chest pressure/discomfort, claudication, exertional chest pressure/discomfort, irregular heart beat, lower extremity edema, near-syncope, orthopnea, paroxysmal nocturnal dyspnea, syncope and tachypnea. Cardiovascular risk factors: diabetes mellitus, dyslipidemia, hypertension, male gender, obesity (BMI >= 30 kg/m2) and smoking/ tobacco exposure. Use of agents associated with hypertension: none. History of target organ damage: none. EKG 2021 was normal    Furosemide 20--never started it   Taking Hydrochlorothiazide 25 mg, wanted 50 mg, but I declined due to risk of gout and Hyperglycemia      blood pressure is Elevated.   180/119, 170/100      BP Readings from Last 3 Encounters:   21 123/61   20 134/86   20 (!) 138/101        Pulse is Normal.    Pulse Readings from Last 3 Encounters:   02/05/21 78   03/09/20 101   02/10/20 93       Patient does have type 2 diabetes mellitus    Stopped drinking soda  Taking lantus 10 units qhs and Metformin  Home Blood Glucose 114 yesterday morning and running in this range  A1c greatly improved, praise given    Lab Results   Component Value Date    LABA1C 5.8 06/16/2021    LABA1C 6.5 (H) 12/02/2020    LABA1C 11.3 03/09/2020       Weight is decreasing intentionally  Wt Readings from Last 3 Encounters:   02/04/21 (!) 473 lb (214.6 kg)   03/09/20 (!) 550 lb (249.5 kg)   02/10/20 (!) 548 lb (248.6 kg)     No results found for: LABURIC, URICACID    Mild depression, improving    PHQ Scores 7/12/2021 5/26/2021 4/5/2021 2/17/2021 2/2/2021 12/1/2020 10/18/2018   PHQ2 Score 2 4 3 4 3 4 6   PHQ9 Score 2 13 16 19 17 23 27   Review of Systems   Constitutional: Positive for fatigue. Negative for activity change, appetite change, chills, diaphoresis and fever. Respiratory: Positive for shortness of breath (JORDAN). Negative for cough, chest tightness and wheezing. Cardiovascular: Positive for palpitations. Negative for chest pain and leg swelling. Gastrointestinal: Negative for abdominal distention, abdominal pain, constipation, diarrhea, nausea and vomiting. Neurological: Positive for dizziness. Negative for headaches. Prior to Visit Medications    Medication Sig Taking?  Authorizing Provider   prazosin (MINIPRESS) 1 MG capsule TAKE ONE CAPSULE BY MOUTH ONCE NIGHTLY Yes Jason Nelson, APRN - NP   risperiDONE (RISPERDAL) 0.5 MG tablet TAKE ONE TABLET BY MOUTH TWICE A DAY Yes Jason Nelson, APRN - NP   sertraline (ZOLOFT) 100 MG tablet TAKE 1 AND 1/2 TABLET BY MOUTH DAILY Yes Jason Nelson APRN - NP   furosemide (LASIX) 20 MG tablet Take 1 tablet by mouth every morning **stop HCTZ, stop sodium in diet** Yes Kalee Sawant MD   buPROPion (WELLBUTRIN SR) 150 MG extended release tablet Take 1 tablet by mouth 2 times daily Please Gauamis, APRN - CNP   glucose monitoring kit (FREESTYLE) monitoring kit Use as directed Yes Malini KULKARNI STEPHON Negron CNP   prazosin (MINIPRESS) 1 MG capsule Take 1 capsule by mouth nightly  STEPHON Fregoso NP       Social History     Tobacco Use    Smoking status: Former Smoker     Packs/day: 1.25     Years: 23.00     Pack years: 28.75     Types: Cigarettes     Quit date: 3/26/2017     Years since quittin.3    Smokeless tobacco: Never Used    Tobacco comment: Quit smoking cold turkey   Vaping Use    Vaping Use: Never used   Substance Use Topics    Alcohol use: No     Alcohol/week: 0.0 standard drinks    Drug use: No          PHYSICAL EXAMINATION:    Vital Signs: (As obtained by patient/caregiver or practitioner observation)  -vital signs stable and within normal limits except morbid obesity improving, uncontrolled hypertension  Patient-Reported Vitals 2021   Patient-Reported Weight 500lb   Patient-Reported Height 6'10   Patient-Reported Systolic 649   Patient-Reported Diastolic 908   Patient-Reported Pulse 80                Constitutional: [x] Appears well-developed and well-nourished [x] No apparent distress      [x] Abnormal-obese      Mental status  [x] Alert and awake  [x] Oriented to person/place/time [x]Able to follow commands      Eyes:  EOM    [x]  Normal  [] Abnormal-  Sclera  [x]  Normal  [] Abnormal -         Discharge [x]  None visible  [] Abnormal -    HENT:   [x] Normocephalic, atraumatic.   [] Abnormal   [x] Mouth/Throat: Mucous membranes are moist.     External Ears [x] Normal  [] Abnormal-     Neck: [x] No visualized mass     Pulmonary/Chest: [x] Respiratory effort normal.  [x] No visualized signs of difficulty breathing or respiratory distress        [] Abnormal        Musculoskeletal:   [x] Normal gait with no signs of ataxia         [x] Normal range of motion of neck        [] Abnormal-       Neurological:        [x] No Facial Asymmetry (Cranial nerve 7 motor function) (limited exam to video visit)          [x] No gaze palsy        [] Abnormal-            Skin:        [x] No significant exanthematous lesions or discoloration noted on facial skin         [] Abnormal-            Psychiatric:      [x] No Hallucinations       []Mood is normal      [x]Behavior is normal      [x]Judgment is normal      [x]Thought content is normal       [x] Abnormal-anxious  Other pertinent observable physical exam findings- none    Due to this being a TeleHealth encounter, evaluation of the following organ systems is limited: Vitals/Constitutional/EENT/Resp/CV/GI//MS/Neuro/Skin/Heme-Lymph-Imm. I personally reviewed most recent labs reviewed with the patient and all questions fully answered.    Hyperglycemia controlled  Diabetes very well controlled  Sodium improved    Otherwise labs within normal limits        Lab Results   Component Value Date    WBC 10.3 06/16/2021    HGB 14.5 06/16/2021    HCT 45.9 06/16/2021    MCV 87.6 06/16/2021     06/16/2021       Lab Results   Component Value Date     06/25/2021    K 4.9 06/25/2021     06/25/2021    CO2 29 06/25/2021    BUN 16 06/25/2021    CREATININE 0.90 06/25/2021    GLUCOSE 105 06/25/2021    CALCIUM 9.9 06/25/2021      Lab Results   Component Value Date    LABA1C 5.8 06/16/2021    LABA1C 6.5 (H) 12/02/2020    LABA1C 11.3 03/09/2020       Lab Results   Component Value Date    ALT 20 06/16/2021    AST 18 06/16/2021    ALKPHOS 63 06/16/2021    BILITOT 0.46 06/16/2021       Lab Results   Component Value Date    TSH 2.65 06/16/2021       Lab Results   Component Value Date    CHOL 130 06/16/2021    CHOL 118 05/03/2019    CHOL 132 08/06/2018     Lab Results   Component Value Date    TRIG 114 06/16/2021    TRIG 98 05/03/2019    TRIG 139 08/06/2018     Lab Results   Component Value Date    HDL 49 06/16/2021    HDL 45 12/02/2020    HDL 43 05/03/2019     Lab Results   Component Value Date    LDLCHOLESTEROL 58 06/16/2021    LDLCHOLESTEROL 53 face with the patient discussing the diagnosis and importance of compliance with the treatment plan as well as documenting on the day of the visit. --Robyn Tom MD on 7/13/2021 at 7:45 PM    An electronic signature was used to authenticate this note.

## 2021-07-13 NOTE — TELEPHONE ENCOUNTER
Note completed, please fax order for bp CUFF  Please fax TO Please fax BP machine [address]  [address] [place] [phone], [phone]    Then, please inform the patient where we faxed it, give patient phone number and address of the medical equipment pharmacy to check on that in about 1 week. Please document that the patient verbalized understanding and wrote down the instructions.

## 2021-07-13 NOTE — PATIENT INSTRUCTIONS
Patient Education        Low Sodium Diet (2,000 Milligram): Care Instructions  Overview     Limiting sodium can be an important part of managing some health problems. The most common source of sodium is salt. People get most of the salt in their diet from canned, prepared, and packaged foods. Fast food and restaurant meals also are very high in sodium. Your doctor will probably limit your sodium to less than 2,000 milligrams (mg) a day. This limit counts all the sodium in prepared and packaged foods and any salt you add to your food. Follow-up care is a key part of your treatment and safety. Be sure to make and go to all appointments, and call your doctor if you are having problems. It's also a good idea to know your test results and keep a list of the medicines you take. How can you care for yourself at home? Read food labels  · Read labels on cans and food packages. The labels tell you how much sodium is in each serving. Make sure that you look at the serving size. If you eat more than the serving size, you have eaten more sodium. · Food labels also tell you the Percent Daily Value for sodium. Choose products with low Percent Daily Values for sodium. · Be aware that sodium can come in forms other than salt, including monosodium glutamate (MSG), sodium citrate, and sodium bicarbonate (baking soda). MSG is often added to Asian food. When you eat out, you can sometimes ask for food without MSG or added salt. Buy low-sodium foods  · Buy foods that are labeled \"unsalted\" (no salt added), \"sodium-free\" (less than 5 mg of sodium per serving), or \"low-sodium\" (140 mg or less of sodium per serving). Foods labeled \"reduced-sodium\" and \"light sodium\" may still have too much sodium. Be sure to read the label to see how much sodium you are getting. · Buy fresh vegetables, or frozen vegetables without added sauces. Buy low-sodium versions of canned vegetables, soups, and other canned goods.   Prepare low-sodium meals  · Cut back on the amount of salt you use in cooking. This will help you adjust to the taste. Do not add salt after cooking. One teaspoon of salt has about 2,300 mg of sodium. · Take the salt shaker off the table. · Flavor your food with garlic, lemon juice, onion, vinegar, herbs, and spices. Do not use soy sauce, lite soy sauce, steak sauce, onion salt, garlic salt, celery salt, or ketchup on your food. · Use low-sodium salad dressings, sauces, and ketchup. Or make your own salad dressings and sauces without adding salt. · Use less salt (or none) when recipes call for it. You can often use half the salt a recipe calls for without losing flavor. Other foods such as rice, pasta, and grains do not need added salt. · Rinse canned vegetables, and cook them in fresh water. This removes somebut not allof the salt. · Avoid water that is naturally high in sodium or that has been treated with water softeners, which add sodium. If you buy bottled water, read the label and choose a sodium-free brand. Avoid high-sodium foods  · Avoid eating:  ? Smoked, cured, salted, and canned meat, fish, and poultry. ? Ham, renteria, hot dogs, and luncheon meats. ? Regular, hard, and processed cheese and regular peanut butter. ? Crackers with salted tops, and other salted snack foods such as pretzels, chips, and salted popcorn. ? Frozen prepared meals, unless labeled low-sodium. ? Canned and dried soups, broths, and bouillon, unless labeled sodium-free or low-sodium. ? Canned vegetables, unless labeled sodium-free or low-sodium. ? Western Geri fries, pizza, tacos, and other fast foods. ? Pickles, olives, ketchup, and other condiments, especially soy sauce, unless labeled sodium-free or low-sodium. Where can you learn more? Go to [email or web address]://helen.[employer]. org and sign in to your [employer] account. Enter M118 in the [facility] box to learn more about \"Low Sodium Diet (2,000 Milligram): Care Instructions. \" If you do not have an account, please click on the \"Sign Up Now\" link. Current as of: December 17, 2020               Content Version: 12.9  © 2006-2021 Healthwise, Incorporated. Care instructions adapted under license by [facility] If you have questions about a medical condition or this instruction, always ask your healthcare professional. [address] any warranty or liability for your use of this information.

## 2021-07-25 DIAGNOSIS — K58.0 IRRITABLE BOWEL SYNDROME WITH DIARRHEA: ICD-10-CM

## 2021-07-26 RX ORDER — LOPERAMIDE HYDROCHLORIDE 2 MG/1
CAPSULE ORAL
Qty: 30 CAPSULE | Refills: 2 | Status: SHIPPED | OUTPATIENT
Start: 2021-07-26

## 2021-08-04 DIAGNOSIS — F63.81 INTERMITTENT EXPLOSIVE DISORDER IN ADULT: ICD-10-CM

## 2021-08-04 DIAGNOSIS — F33.1 MODERATE EPISODE OF RECURRENT MAJOR DEPRESSIVE DISORDER (HCC): ICD-10-CM

## 2021-08-04 RX ORDER — SERTRALINE HYDROCHLORIDE 100 MG/1
TABLET, FILM COATED ORAL
Qty: 45 TABLET | Refills: 0 | Status: SHIPPED | OUTPATIENT
Start: 2021-08-04 | End: 2021-08-30

## 2021-08-04 RX ORDER — RISPERIDONE 0.5 MG/1
TABLET, FILM COATED ORAL
Qty: 60 TABLET | Refills: 0 | Status: SHIPPED | OUTPATIENT
Start: 2021-08-04 | End: 2021-08-30

## 2021-08-08 DIAGNOSIS — F63.81 INTERMITTENT EXPLOSIVE DISORDER IN ADULT: ICD-10-CM

## 2021-08-09 DIAGNOSIS — I10 ESSENTIAL HYPERTENSION: ICD-10-CM

## 2021-08-09 RX ORDER — HYDRALAZINE HYDROCHLORIDE 10 MG/1
TABLET, FILM COATED ORAL
Qty: 90 TABLET | Refills: 3 | Status: SHIPPED
Start: 2021-08-09 | End: 2022-06-02 | Stop reason: HOSPADM

## 2021-08-09 RX ORDER — PRAZOSIN HYDROCHLORIDE 1 MG/1
CAPSULE ORAL
Qty: 30 CAPSULE | Refills: 0 | Status: SHIPPED | OUTPATIENT
Start: 2021-08-09 | End: 2021-10-05

## 2021-08-26 DIAGNOSIS — I10 ESSENTIAL HYPERTENSION: ICD-10-CM

## 2021-08-26 DIAGNOSIS — K21.9 GASTROESOPHAGEAL REFLUX DISEASE WITHOUT ESOPHAGITIS: ICD-10-CM

## 2021-08-26 RX ORDER — FAMOTIDINE 20 MG/1
TABLET, FILM COATED ORAL
Qty: 60 TABLET | Refills: 5 | Status: SHIPPED | OUTPATIENT
Start: 2021-08-26 | End: 2022-08-12 | Stop reason: SDUPTHER

## 2021-08-26 RX ORDER — FUROSEMIDE 20 MG/1
TABLET ORAL
Qty: 30 TABLET | Refills: 1 | OUTPATIENT
Start: 2021-08-26

## 2021-08-26 NOTE — TELEPHONE ENCOUNTER
Please Approve or Refuse.   Send to Pharmacy per Pt's Request:      Next Visit Date:  8/27/2021   Last Visit Date: 7/13/2021    Hemoglobin A1C (%)   Date Value   06/16/2021 5.8   12/02/2020 6.5 (H)   03/09/2020 11.3             ( goal A1C is < 7)   BP Readings from Last 3 Encounters:   02/05/21 123/61   12/16/20 134/86   03/09/20 (!) 138/101          (goal 120/80)  BUN   Date Value Ref Range Status   06/25/2021 16 6 - 20 mg/dL Final     CREATININE   Date Value Ref Range Status   06/25/2021 0.90 0.70 - 1.20 mg/dL Final     Potassium   Date Value Ref Range Status   06/25/2021 4.9 3.7 - 5.3 mmol/L Final

## 2021-08-30 DIAGNOSIS — F63.81 INTERMITTENT EXPLOSIVE DISORDER IN ADULT: ICD-10-CM

## 2021-08-30 DIAGNOSIS — F33.1 MODERATE EPISODE OF RECURRENT MAJOR DEPRESSIVE DISORDER (HCC): ICD-10-CM

## 2021-08-30 RX ORDER — SERTRALINE HYDROCHLORIDE 100 MG/1
TABLET, FILM COATED ORAL
Qty: 45 TABLET | Refills: 0 | Status: SHIPPED | OUTPATIENT
Start: 2021-08-30 | End: 2021-09-27

## 2021-08-30 RX ORDER — RISPERIDONE 0.5 MG/1
TABLET, FILM COATED ORAL
Qty: 60 TABLET | Refills: 0 | Status: SHIPPED | OUTPATIENT
Start: 2021-08-30 | End: 2021-10-07

## 2021-09-27 DIAGNOSIS — F63.81 INTERMITTENT EXPLOSIVE DISORDER IN ADULT: ICD-10-CM

## 2021-09-27 RX ORDER — SERTRALINE HYDROCHLORIDE 100 MG/1
TABLET, FILM COATED ORAL
Qty: 45 TABLET | Refills: 0 | Status: SHIPPED | OUTPATIENT
Start: 2021-09-27 | End: 2021-11-23 | Stop reason: SDUPTHER

## 2021-10-04 DIAGNOSIS — F63.81 INTERMITTENT EXPLOSIVE DISORDER IN ADULT: ICD-10-CM

## 2021-10-05 RX ORDER — PRAZOSIN HYDROCHLORIDE 1 MG/1
CAPSULE ORAL
Qty: 30 CAPSULE | Refills: 0 | Status: SHIPPED | OUTPATIENT
Start: 2021-10-05 | End: 2021-11-03

## 2021-10-06 DIAGNOSIS — F33.1 MODERATE EPISODE OF RECURRENT MAJOR DEPRESSIVE DISORDER (HCC): ICD-10-CM

## 2021-10-06 DIAGNOSIS — F63.81 INTERMITTENT EXPLOSIVE DISORDER IN ADULT: ICD-10-CM

## 2021-10-07 RX ORDER — RISPERIDONE 0.5 MG/1
TABLET, FILM COATED ORAL
Qty: 60 TABLET | Refills: 0 | Status: SHIPPED | OUTPATIENT
Start: 2021-10-07 | End: 2021-11-03

## 2021-10-20 DIAGNOSIS — F33.1 MODERATE EPISODE OF RECURRENT MAJOR DEPRESSIVE DISORDER (HCC): ICD-10-CM

## 2021-10-20 RX ORDER — BUPROPION HYDROCHLORIDE 150 MG/1
TABLET, EXTENDED RELEASE ORAL
Qty: 60 TABLET | Refills: 3 | Status: SHIPPED | OUTPATIENT
Start: 2021-10-20 | End: 2022-01-25 | Stop reason: SDUPTHER

## 2021-11-03 DIAGNOSIS — F63.81 INTERMITTENT EXPLOSIVE DISORDER IN ADULT: ICD-10-CM

## 2021-11-03 DIAGNOSIS — F33.1 MODERATE EPISODE OF RECURRENT MAJOR DEPRESSIVE DISORDER (HCC): ICD-10-CM

## 2021-11-03 RX ORDER — RISPERIDONE 0.5 MG/1
TABLET, FILM COATED ORAL
Qty: 60 TABLET | Refills: 0 | Status: SHIPPED | OUTPATIENT
Start: 2021-11-03 | End: 2021-12-28 | Stop reason: SDUPTHER

## 2021-11-03 RX ORDER — PRAZOSIN HYDROCHLORIDE 1 MG/1
CAPSULE ORAL
Qty: 30 CAPSULE | Refills: 0 | Status: SHIPPED | OUTPATIENT
Start: 2021-11-03 | End: 2021-12-14 | Stop reason: SDUPTHER

## 2021-11-04 ENCOUNTER — HOSPITAL ENCOUNTER (EMERGENCY)
Age: 41
Discharge: HOME | End: 2021-11-04
Attending: EMERGENCY MEDICINE
Payer: COMMERCIAL

## 2021-11-04 VITALS
SYSTOLIC BLOOD PRESSURE: 126 MMHG | HEART RATE: 74 BPM | TEMPERATURE: 97.8 F | OXYGEN SATURATION: 97 % | DIASTOLIC BLOOD PRESSURE: 76 MMHG | HEIGHT: 78 IN | BODY MASS INDEX: 36.45 KG/M2 | WEIGHT: 315 LBS | RESPIRATION RATE: 18 BRPM

## 2021-11-04 DIAGNOSIS — K08.89 PAIN, DENTAL: Primary | ICD-10-CM

## 2021-11-04 PROCEDURE — 99282 EMERGENCY DEPT VISIT SF MDM: CPT

## 2021-11-04 RX ORDER — AMOXICILLIN AND CLAVULANATE POTASSIUM 875; 125 MG/1; MG/1
1 TABLET, FILM COATED ORAL 2 TIMES DAILY
Qty: 20 TABLET | Refills: 0 | Status: SHIPPED | OUTPATIENT
Start: 2021-11-04 | End: 2021-11-14

## 2021-11-04 ASSESSMENT — PAIN DESCRIPTION - PAIN TYPE: TYPE: ACUTE PAIN

## 2021-11-04 ASSESSMENT — PAIN SCALES - GENERAL: PAINLEVEL_OUTOF10: 2

## 2021-11-04 ASSESSMENT — ENCOUNTER SYMPTOMS
COLOR CHANGE: 0
RHINORRHEA: 0
COUGH: 0
BACK PAIN: 0
SHORTNESS OF BREATH: 0
EYE REDNESS: 0
DIARRHEA: 0
VOMITING: 0

## 2021-11-04 ASSESSMENT — PAIN DESCRIPTION - LOCATION: LOCATION: TEETH

## 2021-11-05 ENCOUNTER — TELEPHONE (OUTPATIENT)
Dept: FAMILY MEDICINE CLINIC | Age: 41
End: 2021-11-05

## 2021-11-05 NOTE — TELEPHONE ENCOUNTER
[facility] ([facility]) ED Follow up Call     Reason for ED visit:  Pain, dental      11/5/2021           FU appts/Provider:    Future Appointments   Date Time Provider Sejal Romero   11/18/2021  4:00 PM MD jolly Guillaume sc [facility]         VOICEMAIL DOCUMENTATION - ERASE IF NOT USED  Hi, this message is for Vimal Borjas. This is Flatora EMEKA Saldaña from [address] [place] [address] office. Just calling to see how you are doing after your recent visit to the Emergency Room. [address] [place] [address] wants to make sure you were able to fill any prescriptions and that you understand your discharge instructions. Please return our call if you need to make a follow up appointment with your provider or have any further needs. Our phone number is [phone]. Have a great day.

## 2021-11-10 ENCOUNTER — NURSE TRIAGE (OUTPATIENT)
Dept: OTHER | Facility: CLINIC | Age: 41
End: 2021-11-10

## 2021-11-10 NOTE — TELEPHONE ENCOUNTER
Will discuss at appointment.     Lab Results   Component Value Date    LABA1C 5.8 06/16/2021    LABA1C 6.5 (H) 12/02/2020    LABA1C 11.3 03/09/2020

## 2021-11-10 NOTE — TELEPHONE ENCOUNTER
Received call from Reyes Krebs at [facility] with [employer]. Brief description of triage: hyperglycemia    Triage indicates for patient to home care. Pt states he wants an appointment to discuss with PCP regarding his FSBS 148 and 149. States she wanted to see him if his FSBS was >140. Care advice provided, patient verbalizes understanding; denies any other questions or concerns; instructed to call back for any new or worsening symptoms. Writer provided warm transfer to [employer] at [facility] for appointment scheduling. Attention Provider: Thank you for allowing me to participate in the care of your patient. The patient was connected to triage in response to information provided to the ECC/PSC. Please do not respond through this encounter as the response is not directed to a shared pool. Reason for Disposition   Blood glucose  mg/dL (3.9 -13.3 mmol/L)    Answer Assessment - Initial Assessment Questions  1. BLOOD GLUCOSE: \"What is your blood glucose level? \"       Today 148, 149. Yesterday 120. Today is the only day his FSBS has been over 140 since he has been taken off his insulin and instructed to call if his FSBS was >140    2. ONSET: \"When did you check the blood glucose? \"      Prior to call    3. USUAL RANGE: \"What is your glucose level usually? \" (e.g., usual fasting morning value, usual evening value)      120-130. Today is the only day his FSBS has been over 140. 4. KETONES: \"Do you check for ketones (urine or blood test strips)? \" If yes, ask: \"What does the test show now? \"       FSBS only    5. TYPE 1 or 2:  \"Do you know what type of diabetes you have? \"  (e.g., Type 1, Type 2, Gestational; doesn't know)       Type 2    6. INSULIN: \"Do you take insulin? \" \"What type of insulin(s) do you use? What is the mode of delivery? (syringe, pen; injection or pump)? \"       Stopped insulin    7. DIABETES PILLS: \"Do you take any pills for your diabetes? \" If yes, ask: \"Have you missed taking any pills recently? \"      Compliant with oral DM medication    8. OTHER SYMPTOMS: \"Do you have any symptoms? \" (e.g., fever, frequent urination, difficulty breathing, dizziness, weakness, vomiting)      Denies frequent urination compared to normal. No increased thirst compared to normal. Denies dizziness or weakness. Denies n/v.    9. PREGNANCY: \"Is there any chance you are pregnant? \" \"When was your last menstrual period? \"      n/a    Protocols used: DIABETES - HIGH BLOOD SUGAR-ADULT-

## 2021-11-18 ENCOUNTER — TELEMEDICINE (OUTPATIENT)
Dept: FAMILY MEDICINE CLINIC | Age: 41
End: 2021-11-18
Payer: COMMERCIAL

## 2021-11-18 DIAGNOSIS — E78.5 HYPERLIPIDEMIA WITH TARGET LDL LESS THAN 100: ICD-10-CM

## 2021-11-18 DIAGNOSIS — E11.65 TYPE 2 DIABETES MELLITUS WITH HYPERGLYCEMIA, WITHOUT LONG-TERM CURRENT USE OF INSULIN (HCC): ICD-10-CM

## 2021-11-18 DIAGNOSIS — I10 ESSENTIAL HYPERTENSION: Primary | ICD-10-CM

## 2021-11-18 DIAGNOSIS — G47.39 OTHER SLEEP APNEA: ICD-10-CM

## 2021-11-18 PROCEDURE — 99214 OFFICE O/P EST MOD 30 MIN: CPT | Performed by: FAMILY MEDICINE

## 2021-11-18 PROCEDURE — G8427 DOCREV CUR MEDS BY ELIG CLIN: HCPCS | Performed by: FAMILY MEDICINE

## 2021-11-18 PROCEDURE — 2022F DILAT RTA XM EVC RTNOPTHY: CPT | Performed by: FAMILY MEDICINE

## 2021-11-18 PROCEDURE — 3044F HG A1C LEVEL LT 7.0%: CPT | Performed by: FAMILY MEDICINE

## 2021-11-18 ASSESSMENT — ENCOUNTER SYMPTOMS
DIARRHEA: 0
APNEA: 1
ABDOMINAL PAIN: 0
CHEST TIGHTNESS: 0
WHEEZING: 0
SHORTNESS OF BREATH: 0
CONSTIPATION: 0
VOMITING: 0
ABDOMINAL DISTENTION: 0
NAUSEA: 0

## 2021-11-18 NOTE — PROGRESS NOTES
[facility] Phone      Send Link Via Text    No text has been sent  Last text sent2021 4:32 PM    Email      Send Link Via Email    No email has been sent  Last email sent2021 4:32 PM  [email or web address]  This patient cannot be sent a video visit link for this encounter. Link sent successfully                        2021    TELEHEALTH EVALUATION -- Audio/Visual (During [id number] public health emergency)      Juani Madrid (:  1980) is a 39 y.o. male,Established patient, here for evaluation of the following chief complaint(s): Diabetes, Hypertension, and ED Follow-up        ASSESSMENT/PLAN:    1. Essential hypertension  Well controlled. Continue current treatment. Lisinopril 40 mg daily, HCTZ 25 mg, potassium 10 mEq, hydralazine 10 mg 3 times a day, atenolol 100 mg daily. Will recheck labs. -     CBC; Future  -     Comprehensive Metabolic Panel; Future    We will add [place] if worsening diabetes      2. Type 2 diabetes mellitus with hyperglycemia, without long-term current use of insulin (HCC)  Improved  Lab Results   Component Value Date    LABA1C 5.8 2021    LABA1C 6.5 (H) 2020    LABA1C 11.3 2020       -     CBC; Future  -     Comprehensive Metabolic Panel; Future  -     Hemoglobin A1C; Future  -advised home blood glucose testing daily  -daily feet exam, Foot care: advised to wash feet daily, pat dry and apply lotion at night, avoiding between toes. Need to look at feet daily and report to a physician any signs of inflammation or skin damage  -annual dilated eye exam  -Low carb, low fat diet, increase fruits and vegetables, and exercise 4-5 times a day 30-40 minutes a day discussed  -continue current treatment Metformin 1000 mg twice a day  -continue Aspirin  -continue ACEI/Lisinopril and statin lipitor 10 mg      3.  Other sleep apnea  Ongoing  Would benefit from sleep study    -     Baseline Diagnostic Sleep Study; Future  -     Sleep Study with PAP Titration; Future  4. Hyperlipidemia with target LDL less than 100  Well controlled. Continue current treatment. Lipitor 10 mg      Lab Results   Component Value Date    LDLCHOLESTEROL 58 2021         Chuck received counseling on the following healthy behaviors: nutrition, exercise, medication adherence and weight loss    Reviewed prior labs and health maintenance  Discussed use, benefit, and side effects of prescribed medications. Barriers to medication compliance addressed. Patient given educational materials - see patient instructions  All patient questions answered. Patient voiced understanding. The patient's past medical,surgical, social, and family history as well as his current medications and allergies were reviewed as documented in today's encounter. Medications, labs, diagnostic studies, consultations and follow-up as documented in this encounter. Return in about 3 months (around 2022) for ALWAYS NEEDS 30 MIN. APPT, DM2, HTN, HLP, LABS F/U. Data Unavailable    No future appointments. SUBJECTIVE/OBJECTIVE:  Monika Krause (:  1980) has requested an audio/video evaluation for the following concern(s):Diabetes, Hypertension, and ED Follow-up    Was seen in ED on 2021 due to dental pain  Has . aptp with Dentist in January at [email or web address].  He was given prescription for Augmentin, patient says he feels better. Now leaves in  [place] with his wife      Hypertension:    he  is not exercising, but staying active, and is adherent to low salt diet. Blood pressure is well controlled at home. Cardiac symptoms fatigue. Patient denies chest pain, chest pressure/discomfort, claudication, dyspnea, exertional chest pressure/discomfort, irregular heart beat, lower extremity edema, near-syncope, orthopnea, palpitations, paroxysmal nocturnal dyspnea, syncope and tachypnea.   Cardiovascular risk factors: diabetes mellitus, dyslipidemia, Constitutional: Positive for fatigue and unexpected weight change. Negative for activity change, appetite change, chills, diaphoresis and fever. Eyes: Positive for visual disturbance (stable, chronic, has glasses). Respiratory: Positive for apnea and choking. Negative for cough, chest tightness, shortness of breath and wheezing. Cardiovascular: Negative for chest pain, palpitations and leg swelling. Gastrointestinal: Negative for abdominal distention, abdominal pain, constipation, diarrhea, nausea and vomiting. Endocrine: Negative for cold intolerance, heat intolerance, polydipsia, polyphagia and polyuria. Neurological: Positive for headaches. Psychiatric/Behavioral: Positive for sleep disturbance. Prior to Visit Medications    Medication Sig Taking? Authorizing Provider   risperiDONE (RISPERDAL) 0.5 MG tablet TAKE ONE TABLET BY MOUTH TWICE A DAY Yes STEPHON Lopez - PAYTON   prazosin (MINIPRESS) 1 MG capsule TAKE ONE CAPSULE BY MOUTH ONCE NIGHTLY Yes STEPHON Lopez - PAYTON   buPROPion (WELLBUTRIN SR) 150 MG extended release tablet TAKE ONE TABLET BY MOUTH TWICE A DAY Yes Argentina Bernal MD   sertraline (ZOLOFT) 100 MG tablet TAKE 1 AND 1/2 TABLET BY MOUTH DAILY Yes STEPHON Lopez - NP   famotidine (PEPCID) 20 MG tablet TAKE ONE TABLET BY MOUTH TWICE A DAY Yes Argentina Bernal MD   hydrALAZINE (APRESOLINE) 10 MG tablet TAKE ONE TABLET BY MOUTH THREE TIMES A DAY TO KEEP BLOOD PRESSURE BELOW 138/85 AND ABOVE 115/65 Yes Argentina Bernal MD   loperamide (IMODIUM) 2 MG capsule TAKE ONE CAPSULE BY MOUTH FOUR TIMES A DAY AS NEEDED FOR DIARRHEA Yes Argentina Bernal MD   Blood Pressure KIT Diagnosis: HTN. Needs to check blood pressure 1-2 times a day until stable, then once a day. Goal blood pressure less than 135/85, and above 110/60.  Yes Argentina Bernal MD   hydroCHLOROthiazide (HYDRODIURIL) 25 MG tablet Take 1 tablet by mouth daily **ignore RX for Furosemide** Yes Wilfred Evans MD   metFORMIN (GLUCOPHAGE) 1000 MG tablet TAKE ONE TABLET BY MOUTH TWICE A DAY WITH MEALS Yes Wilfred Evans MD   hydrOXYzine (ATARAX) 25 MG tablet Take 1 tablet by mouth 3 times daily as needed for Anxiety Yes STEPHON Ghotra NP   traZODone (DESYREL) 50 MG tablet Take 0.5 tablets by mouth nightly as needed for Sleep Yes STEPHON Ghotra NP   atenolol (TENORMIN) 100 MG tablet Take 1 tablet by mouth daily Dose increased 2/2/2021.for HTN and anxiety. Keep BP bellow 1135/85 and above 115/65, and pulse 56-80 Yes Analia Krueger MD   atorvastatin (LIPITOR) 10 MG tablet Take 1 tablet by mouth nightly Yes Wilfred Evans MD   aspirin EC 81 MG EC tablet Take 1 tablet by mouth daily Yes Wilfred Evans MD   lisinopril (PRINIVIL;ZESTRIL) 40 MG tablet Take 1 tablet by mouth daily Yes Wilfred Evans MD   potassium chloride (KLOR-CON M) 10 MEQ extended release tablet Take 1 tablet by mouth daily Yes Wilfred Evans MD   blood glucose monitor strips Test 2-3 times a day & as needed for symptoms of irregular blood glucose. Yes Wilfred Evans MD   Alcohol Swabs (ALCOHOL PREP) PADS Use as directed Yes Wilfred Evans MD   Lancets MISC 1 each by Does not apply route 2 times daily Yes Wilfred Evans MD   Blood Pressure Monitor KIT Use as directed.  Yes STEPHON Lantigua CNP   glucose monitoring kit (FREESTYLE) monitoring kit Use as directed Yes STEPHON Lantigua CNP   tadalafil (CIALIS) 5 MG tablet Take 1 tablet by mouth daily as needed for Erectile Dysfunction  Patient not taking: Reported on 11/17/2021  Wilfred Evans MD   prazosin (MINIPRESS) 1 MG capsule Take 1 capsule by mouth nightly  STEPHON Ghotra NP   UNIFINE PENTIPS 31G X 6 MM MISC Inject 1 each into the skin daily  Wilfred Evans MD       Social History     Tobacco Use    Smoking status: Former Smoker     Packs/day: 1.25     Years: 23.00     Pack years: 28.75 Types: Cigarettes     Quit date: 3/26/2017     Years since quittin.6    Smokeless tobacco: Never Used    Tobacco comment: Quit smoking cold turkey   Vaping Use    Vaping Use: Never used   Substance Use Topics    Alcohol use: No     Alcohol/week: 0.0 standard drinks    Drug use: No          PHYSICAL EXAMINATION:    Vital Signs: (As obtained by patient/caregiver or practitioner observation)  -vital signs stable and within normal limits except morbid obesity per BMI, BMI 54.37 kg/M2  Patient-Reported Vitals 2021   Patient-Reported Weight 520 lbs   Patient-Reported Height 6 ft 10 in   Patient-Reported Systolic 185   Patient-Reported Diastolic 76   Patient-Reported Pulse -      Patient's wife took the next circumference during the office visit 19.5 in neck        Constitutional: [x] Appears well-developed and well-nourished [x] No apparent distress      [x] Abnormal-obese    Mental status  [x] Alert and awake  [x] Oriented to person/place/time [x]Able to follow commands      Eyes:  EOM    [x]  Normal  [] Abnormal-  Sclera  [x]  Normal  [] Abnormal -         Discharge [x]  None visible  [] Abnormal -    HENT:   [x] Normocephalic, atraumatic.   [] Abnormal   [x] Mouth/Throat: Mucous membranes are moist.     External Ears [x] Normal  [] Abnormal-     Neck: [x] No visualized mass     Pulmonary/Chest: [x] Respiratory effort normal.  [x] No visualized signs of difficulty breathing or respiratory distress        [] Abnormal        Musculoskeletal:   [x] Normal gait with no signs of ataxia         [x] Normal range of motion of neck        [] Abnormal-       Neurological:        [x] No Facial Asymmetry (Cranial nerve 7 motor function) (limited exam to video visit)          [x] No gaze palsy        [] Abnormal-            Skin:        [x] No significant exanthematous lesions or discoloration noted on facial skin         [] Abnormal-            Psychiatric:      [x] No Hallucinations       []Mood is normal the diagnosis and importance of compliance with the treatment plan as well as documenting on the day of the visit. --Akhil Fairchild MD on 11/20/2021 at 10:55 PM    An electronic signature was used to authenticate this note.

## 2021-11-20 PROBLEM — G47.39 OTHER SLEEP APNEA: Status: ACTIVE | Noted: 2018-04-05

## 2021-11-20 ASSESSMENT — ENCOUNTER SYMPTOMS
COUGH: 0
CHOKING: 1

## 2021-11-23 DIAGNOSIS — E11.65 TYPE 2 DIABETES MELLITUS WITH HYPERGLYCEMIA, WITHOUT LONG-TERM CURRENT USE OF INSULIN (HCC): ICD-10-CM

## 2021-11-23 NOTE — TELEPHONE ENCOUNTER
Please Approve or Refuse.   Send to Pharmacy per Pt's Request:      Next Visit Date:  1/25/2022   Last Visit Date: 11/18/2021    Hemoglobin A1C (%)   Date Value   06/16/2021 5.8   12/02/2020 6.5 (H)   03/09/2020 11.3             ( goal A1C is < 7)   BP Readings from Last 3 Encounters:   11/04/21 126/76   02/05/21 123/61   12/16/20 134/86          (goal 120/80)  BUN   Date Value Ref Range Status   06/25/2021 16 6 - 20 mg/dL Final     CREATININE   Date Value Ref Range Status   06/25/2021 0.90 0.70 - 1.20 mg/dL Final     Potassium   Date Value Ref Range Status   06/25/2021 4.9 3.7 - 5.3 mmol/L Final

## 2021-12-02 DIAGNOSIS — I10 ESSENTIAL HYPERTENSION: ICD-10-CM

## 2021-12-02 DIAGNOSIS — E78.5 HYPERLIPIDEMIA WITH TARGET LDL LESS THAN 100: ICD-10-CM

## 2021-12-02 RX ORDER — HYDROCHLOROTHIAZIDE 25 MG/1
25 TABLET ORAL DAILY
Qty: 90 TABLET | Refills: 3 | Status: SHIPPED
Start: 2021-12-02 | End: 2022-01-25 | Stop reason: ALTCHOICE

## 2021-12-02 RX ORDER — ATORVASTATIN CALCIUM 10 MG/1
TABLET, FILM COATED ORAL
Qty: 90 TABLET | Refills: 3 | Status: SHIPPED | OUTPATIENT
Start: 2021-12-02

## 2021-12-14 DIAGNOSIS — F63.81 INTERMITTENT EXPLOSIVE DISORDER IN ADULT: ICD-10-CM

## 2021-12-15 DIAGNOSIS — F63.81 INTERMITTENT EXPLOSIVE DISORDER IN ADULT: ICD-10-CM

## 2021-12-15 RX ORDER — PRAZOSIN HYDROCHLORIDE 1 MG/1
1 CAPSULE ORAL NIGHTLY
Qty: 90 CAPSULE | Refills: 0 | Status: SHIPPED | OUTPATIENT
Start: 2021-12-15 | End: 2022-01-25 | Stop reason: SDUPTHER

## 2021-12-15 RX ORDER — PRAZOSIN HYDROCHLORIDE 1 MG/1
CAPSULE ORAL
Qty: 30 CAPSULE | Refills: 0 | Status: SHIPPED | OUTPATIENT
Start: 2021-12-15 | End: 2022-01-24

## 2021-12-29 DIAGNOSIS — F63.81 INTERMITTENT EXPLOSIVE DISORDER IN ADULT: ICD-10-CM

## 2021-12-29 DIAGNOSIS — F33.1 MODERATE EPISODE OF RECURRENT MAJOR DEPRESSIVE DISORDER (HCC): ICD-10-CM

## 2021-12-29 RX ORDER — RISPERIDONE 0.5 MG/1
TABLET, FILM COATED ORAL
Qty: 60 TABLET | Refills: 0 | Status: SHIPPED | OUTPATIENT
Start: 2021-12-29 | End: 2022-01-24

## 2022-01-12 DIAGNOSIS — I10 ESSENTIAL HYPERTENSION: ICD-10-CM

## 2022-01-12 RX ORDER — POTASSIUM CHLORIDE 750 MG/1
10 TABLET, EXTENDED RELEASE ORAL DAILY
Qty: 90 TABLET | Refills: 3 | Status: SHIPPED | OUTPATIENT
Start: 2022-01-12 | End: 2022-05-11 | Stop reason: SDUPTHER

## 2022-01-13 DIAGNOSIS — I10 ESSENTIAL HYPERTENSION: ICD-10-CM

## 2022-01-13 RX ORDER — POTASSIUM CHLORIDE 750 MG/1
10 TABLET, EXTENDED RELEASE ORAL DAILY
Qty: 90 TABLET | Refills: 3 | OUTPATIENT
Start: 2022-01-13

## 2022-01-24 ASSESSMENT — COLUMBIA-SUICIDE SEVERITY RATING SCALE - C-SSRS
5. HAVE YOU STARTED TO WORK OUT OR WORKED OUT THE DETAILS OF HOW TO KILL YOURSELF? DO YOU INTEND TO CARRY OUT THIS PLAN?: NO
4. HAVE YOU HAD THESE THOUGHTS AND HAD SOME INTENTION OF ACTING ON THEM?: NO
1. WITHIN THE PAST MONTH, HAVE YOU WISHED YOU WERE DEAD OR WISHED YOU COULD GO TO SLEEP AND NOT WAKE UP?: YES
6. HAVE YOU EVER DONE ANYTHING, STARTED TO DO ANYTHING, OR PREPARED TO DO ANYTHING TO END YOUR LIFE?: NO
3. HAVE YOU BEEN THINKING ABOUT HOW YOU MIGHT KILL YOURSELF?: NO
2. HAVE YOU ACTUALLY HAD ANY THOUGHTS OF KILLING YOURSELF?: YES

## 2022-01-24 ASSESSMENT — PATIENT HEALTH QUESTIONNAIRE - PHQ9
6. FEELING BAD ABOUT YOURSELF - OR THAT YOU ARE A FAILURE OR HAVE LET YOURSELF OR YOUR FAMILY DOWN: 3
8. MOVING OR SPEAKING SO SLOWLY THAT OTHER PEOPLE COULD HAVE NOTICED. OR THE OPPOSITE, BEING SO FIGETY OR RESTLESS THAT YOU HAVE BEEN MOVING AROUND A LOT MORE THAN USUAL: 3
10. IF YOU CHECKED OFF ANY PROBLEMS, HOW DIFFICULT HAVE THESE PROBLEMS MADE IT FOR YOU TO DO YOUR WORK, TAKE CARE OF THINGS AT HOME, OR GET ALONG WITH OTHER PEOPLE: 3
SUM OF ALL RESPONSES TO PHQ QUESTIONS 1-9: 27
1. LITTLE INTEREST OR PLEASURE IN DOING THINGS: 3
SUM OF ALL RESPONSES TO PHQ9 QUESTIONS 1 & 2: 6
2. FEELING DOWN, DEPRESSED OR HOPELESS: 3
4. FEELING TIRED OR HAVING LITTLE ENERGY: 3
SUM OF ALL RESPONSES TO PHQ QUESTIONS 1-9: 27
SUM OF ALL RESPONSES TO PHQ QUESTIONS 1-9: 27
9. THOUGHTS THAT YOU WOULD BE BETTER OFF DEAD, OR OF HURTING YOURSELF: 3
SUM OF ALL RESPONSES TO PHQ QUESTIONS 1-9: 24
3. TROUBLE FALLING OR STAYING ASLEEP: 3
5. POOR APPETITE OR OVEREATING: 3
7. TROUBLE CONCENTRATING ON THINGS, SUCH AS READING THE NEWSPAPER OR WATCHING TELEVISION: 3

## 2022-01-24 NOTE — PROGRESS NOTES
Visit Information    Have you changed or started any medications since your last visit including any over-the-counter medicines, vitamins, or herbal medicines? no   Have you stopped taking any of your medications? Is so, why? -  no  Are you having any side effects from any of your medications? - no    Have you seen any other physician or provider since your last visit?  no   Have you had any other diagnostic tests since your last visit?  no   Have you been seen in the emergency room and/or had an admission in a hospital since we last saw you?  no   Have you had your routine dental cleaning in the past 6 months?  no     Do you have an active MyChart account? If no, what is the barrier?   Yes    Patient Care Team:  Doyle Duran MD as PCP - General (Family Medicine)  Doyle Duran MD as PCP - [facility] Provider  Chano Patton, PhD (Psychology)  STEPHON Santiago - NP (Psychiatry)    Medical History Review  Past Medical, Family, and Social History reviewed and does contribute to the patient presenting condition    Health Maintenance   Topic Date Due    COVID-19 Vaccine (1) Never done    Diabetic foot exam  Never done    Diabetic retinal exam  Never done    Hepatitis B vaccine (1 of 3 - Risk 3-dose series) Never done    Flu vaccine (1) 06/30/2022 (Originally 9/1/2021)    A1C test (Diabetic or Prediabetic)  06/16/2022    Diabetic microalbuminuria test  06/16/2022    Lipid screen  06/16/2022    Potassium monitoring  06/25/2022    Creatinine monitoring  06/25/2022    Depression Monitoring  07/12/2022    DTaP/Tdap/Td vaccine (2 - Td or Tdap) 12/28/2026    Pneumococcal 0-64 years Vaccine (2 of 2 - PPSV23) 08/06/2045    Hepatitis C screen  Completed    HIV screen  Completed    Hepatitis A vaccine  Aged Out    Hib vaccine  Aged Out    Meningococcal (ACWY) vaccine  Aged Out    Varicella vaccine  Discontinued

## 2022-01-25 ENCOUNTER — TELEMEDICINE (OUTPATIENT)
Dept: FAMILY MEDICINE CLINIC | Age: 42
End: 2022-01-25
Payer: COMMERCIAL

## 2022-01-25 DIAGNOSIS — E11.65 TYPE 2 DIABETES MELLITUS WITH HYPERGLYCEMIA, WITHOUT LONG-TERM CURRENT USE OF INSULIN (HCC): ICD-10-CM

## 2022-01-25 DIAGNOSIS — E78.5 HYPERLIPIDEMIA WITH TARGET LDL LESS THAN 100: ICD-10-CM

## 2022-01-25 DIAGNOSIS — F33.1 MODERATE EPISODE OF RECURRENT MAJOR DEPRESSIVE DISORDER (HCC): ICD-10-CM

## 2022-01-25 DIAGNOSIS — Z28.21 COVID-19 VACCINATION DECLINED: ICD-10-CM

## 2022-01-25 DIAGNOSIS — I10 ESSENTIAL HYPERTENSION: Primary | ICD-10-CM

## 2022-01-25 PROCEDURE — 3046F HEMOGLOBIN A1C LEVEL >9.0%: CPT | Performed by: FAMILY MEDICINE

## 2022-01-25 PROCEDURE — 2022F DILAT RTA XM EVC RTNOPTHY: CPT | Performed by: FAMILY MEDICINE

## 2022-01-25 PROCEDURE — 99214 OFFICE O/P EST MOD 30 MIN: CPT | Performed by: FAMILY MEDICINE

## 2022-01-25 PROCEDURE — G8427 DOCREV CUR MEDS BY ELIG CLIN: HCPCS | Performed by: FAMILY MEDICINE

## 2022-01-25 RX ORDER — PRAZOSIN HYDROCHLORIDE 1 MG/1
1 CAPSULE ORAL NIGHTLY
Qty: 30 CAPSULE | Refills: 3 | Status: SHIPPED | OUTPATIENT
Start: 2022-01-25 | End: 2022-06-02 | Stop reason: SDUPTHER

## 2022-01-25 RX ORDER — BUPROPION HYDROCHLORIDE 150 MG/1
150 TABLET, EXTENDED RELEASE ORAL 2 TIMES DAILY
Qty: 60 TABLET | Refills: 3 | Status: SHIPPED | OUTPATIENT
Start: 2022-01-25 | End: 2022-06-08

## 2022-01-25 RX ORDER — SERTRALINE HYDROCHLORIDE 100 MG/1
150 TABLET, FILM COATED ORAL DAILY
Qty: 45 TABLET | Refills: 3 | Status: SHIPPED | OUTPATIENT
Start: 2022-01-25 | End: 2022-07-13

## 2022-01-25 RX ORDER — FUROSEMIDE 20 MG/1
20-40 TABLET ORAL DAILY
Qty: 60 TABLET | Refills: 0 | Status: SHIPPED | OUTPATIENT
Start: 2022-01-25 | End: 2022-02-21

## 2022-01-25 ASSESSMENT — ENCOUNTER SYMPTOMS
SHORTNESS OF BREATH: 0
NAUSEA: 0
DIARRHEA: 0
ABDOMINAL PAIN: 0
COUGH: 0
CHEST TIGHTNESS: 0
ABDOMINAL DISTENTION: 0
CHOKING: 1
CONSTIPATION: 0
APNEA: 1
VOMITING: 0
WHEEZING: 0

## 2022-01-25 NOTE — PROGRESS NOTES
[facility] Phone      Send Link Via Text    No text has been sent  Last text sent2022 1:39 PM  [facility]    Mobile Phone      Send Link Via Text    No text has been sent  Last text sent2022 1:39 PM          2022    TELEHEALTH EVALUATION -- Audio/Visual (During WNE-92 public health emergency)      Pepper Agrawal (:  1980) is a 39 y.o. male,Established patient, here for evaluation of the following chief complaint(s): Hypertension, Diabetes, and Depression        ASSESSMENT/PLAN:    1. Essential hypertension  Well controlled. We will switch hydrochlorothiazide to furosemide, continue hydralazine 25 mg 3 times a day as needed, atenolol 100 mg daily, lisinopril 40 mg daily prazosin 1 mg at night which he also takes for PTSD  Will recheck labs. Discussed low salt diet and BP and pulse monitoring.    -     CBC; Future  -     Comprehensive Metabolic Panel; Future  -     Magnesium; Future  -     Uric Acid; Future  -     TSH without Reflex; Future  -     furosemide (LASIX) 20 MG tablet; Take 1-2 tablets by mouth daily ** stop Hydrochlorothiazide**, Disp-60 tablet, R-0Normal  2. Type 2 diabetes mellitus with hyperglycemia, without long-term current use of insulin ([facility])  Improved  Lab Results   Component Value Date    LABA1C 5.8 2021    LABA1C 6.5 (H) 2020    LABA1C 11.3 2020       -     CBC; Future  -     Comprehensive Metabolic Panel; Future  -     Hemoglobin A1C; Future  -     TSH without Reflex; Future  -     Vitamin B12 & Folate; Future    -advised home blood glucose testing  daily  -daily feet exam, Foot care: advised to wash feet daily, pat dry and apply lotion at night, avoiding between toes.  Need to look at feet daily and report to a physician any signs of inflammation or skin damage  -annual dilated eye exam  -Low carb, low fat diet, increase fruits and vegetables, and exercise 4-5 times a day 30-40 minutes a day discussed  -continue current treatment Metformin 1000 mg twice a day  -continue Aspirin 81 mg  -continue lisinopril ACEI and statin Lipitor 10 mg daily      3. Moderate episode of recurrent major depressive disorder ([address]  Worsening  Patient says he does not want to see the psychiatrist and psychologist I have referred him at the last appointment  I recommended him a local psychiatrist In [place], I did give him contact information and he wrote down the instructions, his significant other was close to him, so he wrote down the instructions and he promises me he will contact that place which is close to him    He declines changing any medications  We will continue the same medications except trazodone which he already stopped    [address]  Phone #[phone]    -     buPROPion [facility] SR) 150 MG extended release tablet; Take 1 tablet by mouth 2 times daily, Disp-60 tablet, R-3Normal  -     prazosin (MINIPRESS) 1 MG capsule; Take 1 capsule by mouth nightly, Disp-30 capsule, R-3Normal  -     sertraline (ZOLOFT) 100 MG tablet; Take 1.5 tablets by mouth daily, Disp-45 tablet, R-3Normal    Careful review of urgent symptoms and need for immediate medical attention if condition worsens. Patient expressed understanding. Advised to go to ED if severe symptoms develop. Patient expressed understanding. 4. Hyperlipidemia with target LDL less than 100  Well-controlled  Continue Lipitor 10 mg daily  Lab Results   Component Value Date    LDLCHOLESTEROL 58 06/16/2021       5.  COVID-19 vaccination declined  We discussed that he needs to get his COVID-19 vaccine, I answered all of his questions, he says he will think about    [address]  [address] [place] [address]  Phone #[phone]    Patient says he does not want to see the psychiatrist and psychologist I have referred him at the last appointment  I recommended him a local psychiatrist In [place], I did give him contact information  He declines changing any medications      He never completed the last blood work, will reorder it  Melissa Sigala received counseling on the following healthy behaviors: nutrition, exercise, medication adherence, tobacco cessation and weight loss  Reviewed prior labs and health maintenance  Discussed use, benefit, and side effects of prescribed medications. Barriers to medication compliance addressed. Patient given educational materials - see patient instructions  All patient questions answered. Patient voiced understanding. The patient's past medical,surgical, social, and family history as well as his current medications and allergies were reviewed as documented in today's encounter. Medications, labs, diagnostic studies, consultations and follow-up as documented in this encounter. Return in about 3 months (around 2022) for Face-2F-30mins PHYSICAL, VISION screen, PHQ9. Jesse Joshua Next needs in office, not seen in office in more than 2 years    Future Appointments   Date Time Provider Sejal Romero   2022  3:30 PM SCHEDULE, [facility] COVID SCREENING [facility] COV [place] HOD   2022  8:00 PM [facility] SLEEP RM 1 [facility] SLEEP [place] [facility]         SUBJECTIVE/OBJECTIVE:  Ashwin Carter (:  1980) has requested an audio/video evaluation for the following concern(s):Hypertension, Diabetes, and Depression    Hypertension:    he  is not exercising and is adherent to low salt diet. Blood pressure is well controlled at home. Cardiac symptoms fatigue and lower extremity edema. Patient denies chest pain, chest pressure/discomfort, claudication, dyspnea, exertional chest pressure/discomfort, irregular heart beat, near-syncope, orthopnea, palpitations, paroxysmal nocturnal dyspnea, syncope and tachypnea. Cardiovascular risk factors: diabetes mellitus, dyslipidemia, hypertension, male gender and obesity (BMI >= 30 kg/m2). Use of agents associated with hypertension: none. History of target organ damage: none.   He feels the hydrochlorothiazide does not help with the leg edema. Reports compliance with his medications  blood pressure is Normal. 130/80    BP Readings from Last 3 Encounters:   11/04/21 126/76   02/05/21 123/61   12/16/20 134/86        Pulse is Normal.    Pulse Readings from Last 3 Encounters:   11/04/21 74   02/05/21 78   03/09/20 101     Diabetes mellitus type 2    Weight is improving, he reports his weight is now 509 lbs, has lost 11 pounds in 3 months, praise given. Wt Readings from Last 3 Encounters:   11/04/21 (!) 520 lb (235.9 kg)   02/04/21 (!) 473 lb (214.6 kg)   03/09/20 (!) 550 lb (249.5 kg)         Home Blood Glucose, has been checking almost every day 103, 109, 140    A1c improved  Lab Results   Component Value Date    LABA1C 5.8 06/16/2021    LABA1C 6.5 (H) 12/02/2020    LABA1C 11.3 03/09/2020   Microalbumin was normal  Lab Results   Component Value Date    LABMICR CANNOT BE CALCULATED 06/16/2021         Depression and anxiety are worsening  Patient says her anxiety has been always there, and It's getting worse. He says he is currently \"not seeing anyone\"  Sleeping 13 hrs a day, doing sleep study which is scheduled already. Currently taking prazosin, Zoloft 150 mg, Wellbutrin 150 mg BID, Risperdal 0.5 mg twice a day  He completed trazodone, did not help, taking prazosin now for PTSD which has been helping with the sleep. Patient says he does not want to see the psychiatrist and psychologist I have referred him at the last appointment  I recommended him a local psychiatrist In [place] [address], I did give him contact information  He declines changing any medications  [facility] [address]  Phone #[phone]    He lives with his significant other female friend, he says he wouldn't do anything to himself  He says he currently does not have any suicidal thoughts  PHQ-2 Over the past 2 weeks, how often have you been bothered by any of the following problems?   Little interest or pleasure in doing things: Nearly every day  Feeling down, depressed, or hopeless: Nearly every day  PHQ-2 Score: 6  PHQ-9 Over the past 2 weeks, how often have you been bothered by any of the following problems? Trouble falling or staying asleep, or sleeping too much: Nearly every day  Feeling tired or having little energy: Nearly every day  Poor appetite or overeating: Nearly every day  Feeling bad about yourself - or that you are a failure or have let yourself or your family down: Nearly every day  Trouble concentrating on things, such as reading the newspaper or watching television: Nearly every day  Moving or speaking so slowly that other people could have noticed. Or the opposite - being so fidgety or restless that you have been moving around a lot more than usual: Nearly every day  Thoughts that you would be better off dead, or of hurting yourself in some way: Nearly every day  If you checked off any problems, how difficult have these problems made it for you to do your work, take care of things at home, or get along with other people?: Extremely dIfficult  PHQ-9 Total Score: 27  PHQ-9 Total Score: 27      AMB C-SSRS Suicide Screening     1) Within the past month, have you wished you were dead or wished you could go to sleep and not wake up? YES   2) Have you actually had any thoughts of killing yourself? YES   3) Have you been thinking about how you might kill yourself? NO   4) Have you had these thoughts and had some intention of acting on them? NO   5) Have you started to work out or worked out the details of how to kill yourself? Do you intend to carry out this plan? NO   6) Have you ever done anything, started to do anything, or prepared to do anything to end your life?  NO     Severe depression worsening      PHQ Scores 1/24/2022 7/12/2021 5/26/2021 4/5/2021 2/17/2021 2/2/2021 12/1/2020   PHQ2 Score 6 2 4 3 4 3 4   PHQ9 Score 27 2 13 16 19 17 23     Hyperlipidemia:  No new myalgias or GI upset on atorvastatin (Lipitor). Medication compliance: compliant all of the time. Patient is  following a low fat, low cholesterol diet. LDL is normal  Lab Results   Component Value Date    LDLCHOLESTEROL 58 06/16/2021     Lab Results   Component Value Date    TRIG 114 06/16/2021    TRIG 98 05/03/2019    TRIG 139 08/06/2018           Review of Systems   Constitutional: Positive for fatigue. Negative for activity change, appetite change, chills, diaphoresis, fever and unexpected weight change. Eyes: Positive for visual disturbance (stable, chronic, has glasses). Respiratory: Positive for apnea and choking. Negative for cough, chest tightness, shortness of breath and wheezing. Cardiovascular: Positive for leg swelling. Negative for chest pain and palpitations. Gastrointestinal: Negative for abdominal distention, abdominal pain, constipation, diarrhea, nausea and vomiting. Endocrine: Negative for cold intolerance, heat intolerance, polydipsia, polyphagia and polyuria. Neurological: Positive for headaches. Negative for numbness. Hematological: Does not bruise/bleed easily. Psychiatric/Behavioral: Positive for decreased concentration, dysphoric mood and sleep disturbance. Negative for self-injury and suicidal ideas. The patient is nervous/anxious. Patient reports oversleeping, pending sleep study         Prior to Visit Medications    Medication Sig Taking?  Authorizing Provider   potassium chloride (KLOR-CON M) 10 MEQ extended release tablet Take 1 tablet by mouth daily Take with hydrochlorothiazide Yes Gabbi Rod MD   risperiDONE (RISPERDAL) 0.5 MG tablet Take 1 tablet by mouth 2 times daily Yes STEPHON Serrato NP   prazosin (MINIPRESS) 1 MG capsule Take 1 capsule by mouth nightly Yes STEPHON Serrato NP   hydroCHLOROthiazide (HYDRODIURIL) 25 MG tablet Take 1 tablet by mouth daily Yes Gabbi Rod MD   atorvastatin (LIPITOR) 10 MG tablet TAKE ONE TABLET BY MOUTH ONCE NIGHTLY Yes Jewels Greenberg MD   sertraline (ZOLOFT) 100 MG tablet Take 1.5 tablets by mouth daily Yes STEPHON Alamo NP   metFORMIN (GLUCOPHAGE) 1000 MG tablet TAKE ONE TABLET BY MOUTH TWICE A DAY WITH MEALS Yes Jewels Greenberg MD   buPROPion (WELLBUTRIN SR) 150 MG extended release tablet TAKE ONE TABLET BY MOUTH TWICE A DAY Yes Jewels Greenberg MD   famotidine (PEPCID) 20 MG tablet TAKE ONE TABLET BY MOUTH TWICE A DAY Yes Jewels Greenberg MD   hydrALAZINE (APRESOLINE) 10 MG tablet TAKE ONE TABLET BY MOUTH THREE TIMES A DAY TO KEEP BLOOD PRESSURE BELOW 138/85 AND ABOVE 115/65 Yes Jewels Greenberg MD   loperamide (IMODIUM) 2 MG capsule TAKE ONE CAPSULE BY MOUTH FOUR TIMES A DAY AS NEEDED FOR DIARRHEA Yes Jewels Greenberg MD   hydrOXYzine (ATARAX) 25 MG tablet Take 1 tablet by mouth 3 times daily as needed for Anxiety Yes STEPHON Alamo NP   traZODone (DESYREL) 50 MG tablet Take 0.5 tablets by mouth nightly as needed for Sleep Yes STEPHON Alamo NP   atenolol (TENORMIN) 100 MG tablet Take 1 tablet by mouth daily Dose increased 2/2/2021.for HTN and anxiety. Keep BP bellow 1135/85 and above 115/65, and pulse 56-80 Yes Analia Krueger MD   aspirin EC 81 MG EC tablet Take 1 tablet by mouth daily Yes Jewels Greenberg MD   lisinopril (PRINIVIL;ZESTRIL) 40 MG tablet Take 1 tablet by mouth daily Yes Jewels Greenberg MD   Blood Pressure KIT Diagnosis: HTN. Needs to check blood pressure 1-2 times a day until stable, then once a day. Goal blood pressure less than 135/85, and above 110/60. Jewels Greenberg MD   prazosin (MINIPRESS) 1 MG capsule Take 1 capsule by mouth nightly  STEPHON Alamo NP   UNIFINE PENTIPS 31G X 6 MM MISC Inject 1 each into the skin daily  Jewels Greenberg MD   blood glucose monitor strips Test 2-3 times a day & as needed for symptoms of irregular blood glucose.   Jewels Greenberg MD   Alcohol Swabs (ALCOHOL PREP) PADS Use as directed Carlos Grimm MD   Lancets MISC 1 each by Does not apply route 2 times daily  Carlos Grimm MD   Blood Pressure Monitor KIT Use as directed. STEPHON Lantigua CNP   glucose monitoring kit (FREESTYLE) monitoring kit Use as directed  STEPHON Lantigua CNP       Social History     Tobacco Use    Smoking status: Former Smoker     Packs/day: 1.25     Years: 23.00     Pack years: 28.75     Types: Cigarettes     Quit date: 3/26/2017     Years since quittin.8    Smokeless tobacco: Never Used    Tobacco comment: Quit smoking cold turkey   Vaping Use    Vaping Use: Never used   Substance Use Topics    Alcohol use: No     Alcohol/week: 0.0 standard drinks    Drug use: No          PHYSICAL EXAMINATION:    Vital Signs: (As obtained by patient/caregiver or practitioner observation)  -vital signs stable and within normal limits except morbid obesity per BMI, last BMI 54.37 kg/M  Patient-Reported Vitals 2022   Patient-Reported Weight 509lb   Patient-Reported Height 6 ft 10 in   Patient-Reported Systolic 489   Patient-Reported Diastolic 80   Patient-Reported Pulse -           Intensity of pain is 0 out of 10      Constitutional: [x] Appears well-developed and well-nourished [x] No apparent distress      [x] Abnormal-obese      Mental status  [x] Alert and awake  [x] Oriented to person/place/time [x]Able to follow commands      Eyes:  EOM    [x]  Normal  [] Abnormal-  Sclera  [x]  Normal  [] Abnormal -         Discharge [x]  None visible  [] Abnormal -    HENT:   [x] Normocephalic, atraumatic.   [] Abnormal   [x] Mouth/Throat: Mucous membranes are moist.     External Ears [x] Normal  [] Abnormal-     Neck: [x] No visualized mass     Pulmonary/Chest: [x] Respiratory effort normal.  [x] No visualized signs of difficulty breathing or respiratory distress        [] Abnormal        Musculoskeletal:   [x] Normal gait with no signs of ataxia         [x] Normal range of motion of neck        [] Abnormal-       Neurological:        [x] No Facial Asymmetry (Cranial nerve 7 motor function) (limited exam to video visit)          [x] No gaze palsy        [] Abnormal-            Skin:        [x] No significant exanthematous lesions or discoloration noted on facial skin         [] Abnormal-            Psychiatric:      [x] No Hallucinations       []Mood is normal      [x]Behavior is normal      [x]Judgment is normal      [x]Thought content is normal       [x] Abnormal-anxious and depressed    Other pertinent observable physical exam findings-none    Due to this being a TeleHealth encounter, evaluation of the following organ systems is limited: Vitals/Constitutional/EENT/Resp/CV/GI//MS/Neuro/Skin/Heme-Lymph-Imm. I personally reviewed most recent labs reviewed with the patient and all questions fully answered.    Hyperglycemia well controlled    Otherwise labs within normal limits        Lab Results   Component Value Date    WBC 10.3 06/16/2021    HGB 14.5 06/16/2021    HCT 45.9 06/16/2021    MCV 87.6 06/16/2021     06/16/2021       Lab Results   Component Value Date     06/25/2021    K 4.9 06/25/2021     06/25/2021    CO2 29 06/25/2021    BUN 16 06/25/2021    CREATININE 0.90 06/25/2021    GLUCOSE 105 06/25/2021    CALCIUM 9.9 06/25/2021        Lab Results   Component Value Date    ALT 20 06/16/2021    AST 18 06/16/2021    ALKPHOS 63 06/16/2021    BILITOT 0.46 06/16/2021       Lab Results   Component Value Date    TSH 2.65 06/16/2021       Lab Results   Component Value Date    CHOL 130 06/16/2021    CHOL 118 05/03/2019    CHOL 132 08/06/2018     Lab Results   Component Value Date    TRIG 114 06/16/2021    TRIG 98 05/03/2019    TRIG 139 08/06/2018     Lab Results   Component Value Date    HDL 49 06/16/2021    HDL 45 12/02/2020    HDL 43 05/03/2019     Lab Results   Component Value Date    LDLCHOLESTEROL 58 06/16/2021    LDLCHOLESTEROL 53 12/02/2020    LDLCHOLESTEROL 55 05/03/2019       Lab Results   Component Value Date    CHOLHDLRATIO 2.7 06/16/2021    CHOLHDLRATIO 2.6 12/02/2020    CHOLHDLRATIO 2.7 05/03/2019       Lab Results   Component Value Date    LABA1C 5.8 06/16/2021    LABA1C 6.5 (H) 12/02/2020    LABA1C 11.3 03/09/2020         Lab Results   Component Value Date    JECYHVUO79 501 06/16/2021       No results found for: VITD25    Orders Placed This Encounter   Medications    buPROPion (WELLBUTRIN SR) 150 MG extended release tablet     Sig: Take 1 tablet by mouth 2 times daily     Dispense:  60 tablet     Refill:  3    prazosin (MINIPRESS) 1 MG capsule     Sig: Take 1 capsule by mouth nightly     Dispense:  30 capsule     Refill:  3    sertraline (ZOLOFT) 100 MG tablet     Sig: Take 1.5 tablets by mouth daily     Dispense:  45 tablet     Refill:  3    furosemide (LASIX) 20 MG tablet     Sig: Take 1-2 tablets by mouth daily ** stop Hydrochlorothiazide**     Dispense:  60 tablet     Refill:  0       Orders Placed This Encounter   Procedures    CBC     Standing Status:   Future     Standing Expiration Date:   1/25/2023    Comprehensive Metabolic Panel     Standing Status:   Future     Standing Expiration Date:   3/24/2022    Magnesium     Standing Status:   Future     Standing Expiration Date:   1/25/2023    Hemoglobin A1C     Standing Status:   Future     Standing Expiration Date:   1/25/2023    Uric Acid     Standing Status:   Future     Standing Expiration Date:   1/25/2023    TSH without Reflex     Standing Status:   Future     Standing Expiration Date:   1/25/2023    Vitamin B12 & Folate     Standing Status:   Future     Standing Expiration Date:   3/24/2022       Medications Discontinued During This Encounter   Medication Reason    prazosin (MINIPRESS) 1 MG capsule LIST CLEANUP    risperiDONE (RISPERDAL) 0.5 MG tablet LIST CLEANUP    buPROPion (WELLBUTRIN SR) 150 MG extended release tablet REORDER    sertraline (ZOLOFT) 100 MG tablet REORDER    prazosin (MINIPRESS) 1 MG capsule REORDER    traZODone (DESYREL) 50 MG tablet Alternate therapy    hydroCHLOROthiazide (HYDRODIURIL) 25 MG tablet Alternate therapy              Krunal Martinez, was evaluated through a synchronous (real-time) audio-video encounter. The patient (or guardian if applicable) is aware that this is a billable service, which includes applicable co-pays. The virtual visit was conducted with patient's (and/or legal guardian consent). Verbal consent to proceed has been obtained within the past 12 months. The visit was conducted pursuant to the emergency declaration under the [address] [place] [address], [address] [place] [address] authority and the [employer] Act. Patient identification was verified    Patient was present with his significant other, who did not introduce herself    The patient was located in a state where the provider was licensed to provide care. On this date 1/25/2022 I have spent 35 minutes reviewing previous notes, test results and face to face with the patient discussing the diagnosis and importance of compliance with the treatment plan as well as documenting on the day of the visit. --Daija Ralph MD on 2/1/2022 at 7:22 PM    An electronic signature was used to authenticate this note.

## 2022-01-25 NOTE — PATIENT INSTRUCTIONS
Patient Education        Learning About Carbohydrate (Carb) Counting and Eating Out When You Have Diabetes  Why plan your meals? Meal planning can be a key part of managing diabetes. Planning meals and snacks with the right balance of carbohydrate, protein, and fat can help you keep your blood sugar at the target level you set with your doctor. You don't have to eat special foods. You can eat what your family eats, including sweets once in a while. But you do have to pay attention to how often you eat and how much you eat of certain foods. You may want to work with a dietitian or a certified diabetes educator. He or she can give you tips and meal ideas and can answer your questions about meal planning. This health professional can also help you reach a healthy weight if that is one of your goals. What should you know about eating carbs? Managing the amount of carbohydrate (carbs) you eat is an important part of healthy meals when you have diabetes. Carbohydrate is found in many foods. · Learn which foods have carbs. And learn the amounts of carbs in different foods. ? Bread, cereal, pasta, and rice have about 15 grams of carbs in a serving. A serving is 1 slice of bread (1 ounce), ½ cup of cooked cereal, or 1/3 cup of cooked pasta or rice. ? Fruits have 15 grams of carbs in a serving. A serving is 1 small fresh fruit, such as an apple or orange; ½ of a banana; ½ cup of cooked or canned fruit; ½ cup of fruit juice; 1 cup of melon or raspberries; or 2 tablespoons of dried fruit. ? Milk and no-sugar-added yogurt have 15 grams of carbs in a serving. A serving is 1 cup of milk or 2/3 cup of no-sugar-added yogurt. ? Starchy vegetables have 15 grams of carbs in a serving. A serving is ½ cup of mashed potatoes or sweet potato; 1 cup winter squash; ½ of a small baked potato; ½ cup of cooked beans; or ½ cup cooked corn or green peas.   · Learn how much carbs to eat each day and at each meal. A dietitian or CDE can teach you how to keep track of the amount of carbs you eat. This is called carbohydrate counting. · If you are not sure how to count carbohydrate grams, use the Plate Method to plan meals. It is a good, quick way to make sure that you have a balanced meal. It also helps you spread carbs throughout the day. ? Divide your plate by types of foods. Put non-starchy vegetables on half the plate, meat or other protein food on one-quarter of the plate, and a grain or starchy vegetable in the final quarter of the plate. To this you can add a small piece of fruit and 1 cup of milk or yogurt, depending on how many carbs you are supposed to eat at a meal.  · Try to eat about the same amount of carbs at each meal. Do not \"save up\" your daily allowance of carbs to eat at one meal.  · Proteins have very little or no carbs per serving. Examples of proteins are beef, chicken, turkey, fish, eggs, tofu, cheese, cottage cheese, and peanut butter. A serving size of meat is 3 ounces, which is about the size of a deck of cards. Examples of meat substitute serving sizes (equal to 1 ounce of meat) are 1/4 cup of cottage cheese, 1 egg, 1 tablespoon of peanut butter, and ½ cup of tofu. How can you eat out and still eat healthy? · Learn to estimate the serving sizes of foods that have carbohydrate. If you measure food at home, it will be easier to estimate the amount in a serving of restaurant food. · If the meal you order has too much carbohydrate (such as potatoes, corn, or baked beans), ask to have a low-carbohydrate food instead. Ask for a salad or green vegetables. · If you use insulin, check your blood sugar before and after eating out to help you plan how much to eat in the future. · If you eat more carbohydrate at a meal than you had planned, take a walk or do other exercise. This will help lower your blood sugar. What are some tips for eating healthy? · Limit saturated fat, such as the fat from meat and dairy products.  This is a healthy choice because people who have diabetes are at higher risk of heart disease. So choose lean cuts of meat and nonfat or low-fat dairy products. Use olive or canola oil instead of butter or shortening when cooking. · Don't skip meals. Your blood sugar may drop too low if you skip meals and take insulin or certain medicines for diabetes. · Check with your doctor before you drink alcohol. Alcohol can cause your blood sugar to drop too low. Alcohol can also cause a bad reaction if you take certain diabetes medicines. Follow-up care is a key part of your treatment and safety. Be sure to make and go to all appointments, and call your doctor if you are having problems. It's also a good idea to know your test results and keep a list of the medicines you take. Where can you learn more? Go to https://Seahorsedoreeneb.Mobilygen. org and sign in to your [employer] account. Enter P999 in the Blackberry box to learn more about \"Learning About Carbohydrate (Carb) Counting and Eating Out When You Have Diabetes. \"     If you do not have an account, please click on the \"Sign Up Now\" link. Current as of: September 8, 2021               Content Version: 13.1  © 5987-4033 [employer]. Care instructions adapted under license by [facility] [place] ([facility]). If you have questions about a medical condition or this instruction, always ask your healthcare professional. [place] [address] any warranty or liability for your use of this information.

## 2022-01-31 DIAGNOSIS — Z01.818 PREOP TESTING: Primary | ICD-10-CM

## 2022-02-01 ENCOUNTER — TELEPHONE (OUTPATIENT)
Dept: FAMILY MEDICINE CLINIC | Age: 42
End: 2022-02-01

## 2022-02-02 NOTE — TELEPHONE ENCOUNTER
Called pt and he was aware to schedule a in office appt, he stated he will call back at another time to schedule an appt.

## 2022-02-07 DIAGNOSIS — I10 ESSENTIAL HYPERTENSION: ICD-10-CM

## 2022-02-07 RX ORDER — ATENOLOL 100 MG/1
100 TABLET ORAL DAILY
Qty: 90 TABLET | Refills: 3 | Status: SHIPPED | OUTPATIENT
Start: 2022-02-07 | End: 2022-05-11 | Stop reason: SDUPTHER

## 2022-02-07 NOTE — TELEPHONE ENCOUNTER
Please Approve or Refuse.   Send to Pharmacy per Pt's Request:      Next Visit Date:  Visit date not found   Last Visit Date: 1/25/2022    Hemoglobin A1C (%)   Date Value   06/16/2021 5.8   12/02/2020 6.5 (H)   03/09/2020 11.3             ( goal A1C is < 7)   BP Readings from Last 3 Encounters:   11/04/21 126/76   02/05/21 123/61   12/16/20 134/86          (goal 120/80)  BUN   Date Value Ref Range Status   06/25/2021 16 6 - 20 mg/dL Final     CREATININE   Date Value Ref Range Status   06/25/2021 0.90 0.70 - 1.20 mg/dL Final     Potassium   Date Value Ref Range Status   06/25/2021 4.9 3.7 - 5.3 mmol/L Final

## 2022-02-08 ENCOUNTER — TELEPHONE (OUTPATIENT)
Dept: FAMILY MEDICINE CLINIC | Age: 42
End: 2022-02-08

## 2022-02-08 NOTE — TELEPHONE ENCOUNTER
Patient has never read my messages, he has never completed the blood work I have ordered for him at the last appointment orders are in the computer to go to [facility] lab    Also he was supposed to establish with psychiatrist, did he make appointment?   From   Carlos Grimm MD To   [address] and Delivered   1/25/2022  2:06 PM   [address] [employer]   Phone #[phone]       [facility] 1/24/2022 7/12/2021 5/26/2021 4/5/2021 2/17/2021 2/2/2021 12/1/2020   PHQ2 Score 6 2 4 3 4 3 4   PHQ9 Score 27 2 13 16 19 17 23     Interpretation of Total Score Depression Severity: 1-4 = Minimal depression, 5-9 = Mild depression, 10-14 = Moderate depression, 15-19 = Moderately severe depression, 20-27 = Severe depression

## 2022-02-09 NOTE — TELEPHONE ENCOUNTER
SPOKE WITH PATIENT REGARDING LAB/MESSAGES/PSYCHIATRIST APPT STATES HE HASN'T DONE ANY OF THESE THINGS.  HE WILL GET DONE

## 2022-02-09 NOTE — TELEPHONE ENCOUNTER
Noted  Future Appointments   Date Time Provider Sejal Romero   2/21/2022  3:30 PM SCHEDULE, [facility] SCREENING [facility] C[facility] [place] [facility]   2/24/2022  8:00 PM [facility] SLEEP RM 1 [facility] SLE[facility] [place] HOD

## 2022-02-21 ENCOUNTER — HOSPITAL ENCOUNTER (OUTPATIENT)
Dept: LAB | Age: 42
Setting detail: SPECIMEN
Discharge: HOME | End: 2022-01-14

## 2022-02-21 DIAGNOSIS — Z01.818 PREOP TESTING: Primary | ICD-10-CM

## 2022-02-21 DIAGNOSIS — I10 ESSENTIAL HYPERTENSION: ICD-10-CM

## 2022-02-21 RX ORDER — FUROSEMIDE 20 MG/1
TABLET ORAL
Qty: 60 TABLET | Refills: 3 | Status: SHIPPED | OUTPATIENT
Start: 2022-02-21 | End: 2022-07-13 | Stop reason: SDUPTHER

## 2022-03-11 ENCOUNTER — HOSPITAL ENCOUNTER (OUTPATIENT)
Age: 42
Discharge: HOME | End: 2022-03-11
Payer: COMMERCIAL

## 2022-03-11 ENCOUNTER — HOSPITAL ENCOUNTER (OUTPATIENT)
Dept: LAB | Age: 42
Setting detail: SPECIMEN
Discharge: HOME | End: 2022-03-11
Payer: COMMERCIAL

## 2022-03-11 DIAGNOSIS — I10 ESSENTIAL HYPERTENSION: ICD-10-CM

## 2022-03-11 DIAGNOSIS — Z01.818 PREOP TESTING: ICD-10-CM

## 2022-03-11 DIAGNOSIS — E11.65 TYPE 2 DIABETES MELLITUS WITH HYPERGLYCEMIA, WITHOUT LONG-TERM CURRENT USE OF INSULIN (HCC): ICD-10-CM

## 2022-03-11 LAB
ALBUMIN SERPL-MCNC: 4.5 G/DL (ref 3.5–5.2)
ALBUMIN/GLOBULIN RATIO: 1.5 (ref 1–2.5)
ALP BLD-CCNC: 80 U/L (ref 40–129)
ALT SERPL-CCNC: 24 U/L (ref 5–41)
ANION GAP SERPL CALCULATED.3IONS-SCNC: 12 MMOL/L (ref 9–17)
AST SERPL-CCNC: 21 U/L
BILIRUB SERPL-MCNC: 0.4 MG/DL (ref 0.3–1.2)
BUN BLDV-MCNC: 17 MG/DL (ref 6–20)
BUN/CREAT BLD: 20 (ref 9–20)
CALCIUM SERPL-MCNC: 10.1 MG/DL (ref 8.6–10.4)
CHLORIDE BLD-SCNC: 96 MMOL/L (ref 98–107)
CO2: 29 MMOL/L (ref 20–31)
CREAT SERPL-MCNC: 0.84 MG/DL (ref 0.7–1.2)
GFR AFRICAN AMERICAN: >60 ML/MIN
GFR NON-AFRICAN AMERICAN: >60 ML/MIN
GFR SERPL CREATININE-BSD FRML MDRD: ABNORMAL ML/MIN/{1.73_M2}
GFR SERPL CREATININE-BSD FRML MDRD: ABNORMAL ML/MIN/{1.73_M2}
GLUCOSE BLD-MCNC: 138 MG/DL (ref 70–99)
HCT VFR BLD CALC: 47.7 % (ref 40.7–50.3)
HEMOGLOBIN: 15.2 G/DL (ref 13–17)
MAGNESIUM: 2 MG/DL (ref 1.6–2.6)
MCH RBC QN AUTO: 28.1 PG (ref 25.2–33.5)
MCHC RBC AUTO-ENTMCNC: 31.9 G/DL (ref 28.4–34.8)
MCV RBC AUTO: 88.2 FL (ref 82.6–102.9)
NRBC AUTOMATED: 0 PER 100 WBC
PDW BLD-RTO: 13.6 % (ref 11.8–14.4)
PLATELET # BLD: 240 K/UL (ref 138–453)
PMV BLD AUTO: 11.3 FL (ref 8.1–13.5)
POTASSIUM SERPL-SCNC: 4.4 MMOL/L (ref 3.7–5.3)
RBC # BLD: 5.41 M/UL (ref 4.21–5.77)
SODIUM BLD-SCNC: 137 MMOL/L (ref 135–144)
TOTAL PROTEIN: 7.5 G/DL (ref 6.4–8.3)
TSH SERPL DL<=0.05 MIU/L-ACNC: 3.3 MIU/L (ref 0.3–5)
URIC ACID: 5.9 MG/DL (ref 3.4–7)
WBC # BLD: 11.6 K/UL (ref 3.5–11.3)

## 2022-03-11 PROCEDURE — U0005 INFEC AGEN DETEC AMPLI PROBE: HCPCS

## 2022-03-11 PROCEDURE — 83036 HEMOGLOBIN GLYCOSYLATED A1C: CPT

## 2022-03-11 PROCEDURE — C9803 HOPD COVID-19 SPEC COLLECT: HCPCS

## 2022-03-11 PROCEDURE — 82607 VITAMIN B-12: CPT

## 2022-03-11 PROCEDURE — 84550 ASSAY OF BLOOD/URIC ACID: CPT

## 2022-03-11 PROCEDURE — 83735 ASSAY OF MAGNESIUM: CPT

## 2022-03-11 PROCEDURE — 85027 COMPLETE CBC AUTOMATED: CPT

## 2022-03-11 PROCEDURE — 84443 ASSAY THYROID STIM HORMONE: CPT

## 2022-03-11 PROCEDURE — U0003 INFECTIOUS AGENT DETECTION BY NUCLEIC ACID (DNA OR RNA); SEVERE ACUTE RESPIRATORY SYNDROME CORONAVIRUS 2 (SARS-COV-2) (CORONAVIRUS DISEASE [COVID-19]), AMPLIFIED PROBE TECHNIQUE, MAKING USE OF HIGH THROUGHPUT TECHNOLOGIES AS DESCRIBED BY CMS-2020-01-R: HCPCS

## 2022-03-11 PROCEDURE — 80053 COMPREHEN METABOLIC PANEL: CPT

## 2022-03-11 PROCEDURE — 36415 COLL VENOUS BLD VENIPUNCTURE: CPT

## 2022-03-11 PROCEDURE — 82746 ASSAY OF FOLIC ACID SERUM: CPT

## 2022-03-12 LAB
ESTIMATED AVERAGE GLUCOSE: 143 MG/DL
FOLATE: 8.8 NG/ML
HBA1C MFR BLD: 6.6 % (ref 4–6)
SARS-COV-2: NORMAL
SARS-COV-2: NOT DETECTED
SOURCE: NORMAL
VITAMIN B-12: 425 PG/ML (ref 232–1245)

## 2022-04-05 DIAGNOSIS — E11.65 TYPE 2 DIABETES MELLITUS WITH HYPERGLYCEMIA, WITHOUT LONG-TERM CURRENT USE OF INSULIN (HCC): ICD-10-CM

## 2022-04-05 RX ORDER — INSULIN GLARGINE 100 [IU]/ML
INJECTION, SOLUTION SUBCUTANEOUS
Qty: 3 ML | OUTPATIENT
Start: 2022-04-05

## 2022-05-11 DIAGNOSIS — I10 ESSENTIAL HYPERTENSION: ICD-10-CM

## 2022-05-12 RX ORDER — POTASSIUM CHLORIDE 750 MG/1
10 TABLET, EXTENDED RELEASE ORAL DAILY
Qty: 90 TABLET | Refills: 3 | Status: SHIPPED | OUTPATIENT
Start: 2022-05-12

## 2022-05-12 RX ORDER — ATENOLOL 100 MG/1
100 TABLET ORAL DAILY
Qty: 90 TABLET | Refills: 3 | Status: SHIPPED | OUTPATIENT
Start: 2022-05-12

## 2022-05-17 ENCOUNTER — TELEMEDICINE (OUTPATIENT)
Dept: FAMILY MEDICINE CLINIC | Age: 42
End: 2022-05-17
Payer: COMMERCIAL

## 2022-05-17 VITALS — BODY MASS INDEX: 36.45 KG/M2 | HEIGHT: 78 IN | WEIGHT: 315 LBS

## 2022-05-17 DIAGNOSIS — F33.1 MODERATE EPISODE OF RECURRENT MAJOR DEPRESSIVE DISORDER (HCC): ICD-10-CM

## 2022-05-17 DIAGNOSIS — E11.65 TYPE 2 DIABETES MELLITUS WITH HYPERGLYCEMIA, WITHOUT LONG-TERM CURRENT USE OF INSULIN (HCC): ICD-10-CM

## 2022-05-17 DIAGNOSIS — G47.39 OTHER SLEEP APNEA: ICD-10-CM

## 2022-05-17 DIAGNOSIS — N52.9 ERECTILE DYSFUNCTION, UNSPECIFIED ERECTILE DYSFUNCTION TYPE: Primary | ICD-10-CM

## 2022-05-17 DIAGNOSIS — E78.5 HYPERLIPIDEMIA WITH TARGET LDL LESS THAN 100: ICD-10-CM

## 2022-05-17 DIAGNOSIS — I10 ESSENTIAL HYPERTENSION: ICD-10-CM

## 2022-05-17 PROCEDURE — G8427 DOCREV CUR MEDS BY ELIG CLIN: HCPCS | Performed by: FAMILY MEDICINE

## 2022-05-17 PROCEDURE — 2022F DILAT RTA XM EVC RTNOPTHY: CPT | Performed by: FAMILY MEDICINE

## 2022-05-17 PROCEDURE — 99214 OFFICE O/P EST MOD 30 MIN: CPT | Performed by: FAMILY MEDICINE

## 2022-05-17 PROCEDURE — 3044F HG A1C LEVEL LT 7.0%: CPT | Performed by: FAMILY MEDICINE

## 2022-05-17 RX ORDER — SILDENAFIL CITRATE 20 MG/1
20 TABLET ORAL DAILY PRN
Qty: 30 TABLET | Refills: 0 | Status: SHIPPED | OUTPATIENT
Start: 2022-05-17

## 2022-05-17 RX ORDER — SILDENAFIL 100 MG/1
100 TABLET, FILM COATED ORAL DAILY PRN
Qty: 30 TABLET | Refills: 3 | Status: SHIPPED | OUTPATIENT
Start: 2022-05-17

## 2022-05-17 ASSESSMENT — COLUMBIA-SUICIDE SEVERITY RATING SCALE - C-SSRS
1. WITHIN THE PAST MONTH, HAVE YOU WISHED YOU WERE DEAD OR WISHED YOU COULD GO TO SLEEP AND NOT WAKE UP?: YES
2. HAVE YOU ACTUALLY HAD ANY THOUGHTS OF KILLING YOURSELF?: YES
3. HAVE YOU BEEN THINKING ABOUT HOW YOU MIGHT KILL YOURSELF?: NO
6. HAVE YOU EVER DONE ANYTHING, STARTED TO DO ANYTHING, OR PREPARED TO DO ANYTHING TO END YOUR LIFE?: YES
7. DID THIS OCCUR IN THE LAST THREE MONTHS: NO
5. HAVE YOU STARTED TO WORK OUT OR WORKED OUT THE DETAILS OF HOW TO KILL YOURSELF? DO YOU INTEND TO CARRY OUT THIS PLAN?: NO
4. HAVE YOU HAD THESE THOUGHTS AND HAD SOME INTENTION OF ACTING ON THEM?: NO

## 2022-05-17 ASSESSMENT — ENCOUNTER SYMPTOMS
COUGH: 0
CONSTIPATION: 0
DIARRHEA: 0
VOMITING: 0
ABDOMINAL DISTENTION: 0
WHEEZING: 0
ABDOMINAL PAIN: 0
CHOKING: 1
SHORTNESS OF BREATH: 0
NAUSEA: 0
CHEST TIGHTNESS: 0
APNEA: 1

## 2022-05-17 ASSESSMENT — PATIENT HEALTH QUESTIONNAIRE - PHQ9
10. IF YOU CHECKED OFF ANY PROBLEMS, HOW DIFFICULT HAVE THESE PROBLEMS MADE IT FOR YOU TO DO YOUR WORK, TAKE CARE OF THINGS AT HOME, OR GET ALONG WITH OTHER PEOPLE: 3
3. TROUBLE FALLING OR STAYING ASLEEP: 1
6. FEELING BAD ABOUT YOURSELF - OR THAT YOU ARE A FAILURE OR HAVE LET YOURSELF OR YOUR FAMILY DOWN: 3
SUM OF ALL RESPONSES TO PHQ QUESTIONS 1-9: 17
8. MOVING OR SPEAKING SO SLOWLY THAT OTHER PEOPLE COULD HAVE NOTICED. OR THE OPPOSITE, BEING SO FIGETY OR RESTLESS THAT YOU HAVE BEEN MOVING AROUND A LOT MORE THAN USUAL: 0
2. FEELING DOWN, DEPRESSED OR HOPELESS: 2
SUM OF ALL RESPONSES TO PHQ QUESTIONS 1-9: 17
1. LITTLE INTEREST OR PLEASURE IN DOING THINGS: 3
5. POOR APPETITE OR OVEREATING: 3
SUM OF ALL RESPONSES TO PHQ QUESTIONS 1-9: 16
4. FEELING TIRED OR HAVING LITTLE ENERGY: 1
SUM OF ALL RESPONSES TO PHQ9 QUESTIONS 1 & 2: 5
SUM OF ALL RESPONSES TO PHQ QUESTIONS 1-9: 17
7. TROUBLE CONCENTRATING ON THINGS, SUCH AS READING THE NEWSPAPER OR WATCHING TELEVISION: 3
9. THOUGHTS THAT YOU WOULD BE BETTER OFF DEAD, OR OF HURTING YOURSELF: 1
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
## 2022-06-02 ENCOUNTER — TELEMEDICINE (OUTPATIENT)
Dept: FAMILY MEDICINE CLINIC | Age: 42
End: 2022-06-02
Payer: COMMERCIAL

## 2022-06-02 DIAGNOSIS — I10 ESSENTIAL HYPERTENSION: ICD-10-CM

## 2022-06-02 DIAGNOSIS — E78.5 HYPERLIPIDEMIA WITH TARGET LDL LESS THAN 100: ICD-10-CM

## 2022-06-02 DIAGNOSIS — F33.1 MODERATE EPISODE OF RECURRENT MAJOR DEPRESSIVE DISORDER (HCC): ICD-10-CM

## 2022-06-02 DIAGNOSIS — N52.9 ERECTILE DYSFUNCTION, UNSPECIFIED ERECTILE DYSFUNCTION TYPE: ICD-10-CM

## 2022-06-02 DIAGNOSIS — E11.65 TYPE 2 DIABETES MELLITUS WITH HYPERGLYCEMIA, WITHOUT LONG-TERM CURRENT USE OF INSULIN (HCC): Primary | ICD-10-CM

## 2022-06-02 PROCEDURE — 2022F DILAT RTA XM EVC RTNOPTHY: CPT | Performed by: FAMILY MEDICINE

## 2022-06-02 PROCEDURE — 3044F HG A1C LEVEL LT 7.0%: CPT | Performed by: FAMILY MEDICINE

## 2022-06-02 PROCEDURE — G8427 DOCREV CUR MEDS BY ELIG CLIN: HCPCS | Performed by: FAMILY MEDICINE

## 2022-06-02 PROCEDURE — 99214 OFFICE O/P EST MOD 30 MIN: CPT | Performed by: FAMILY MEDICINE

## 2022-06-02 RX ORDER — LISINOPRIL 40 MG/1
40 TABLET ORAL DAILY
Qty: 90 TABLET | Refills: 3 | Status: SHIPPED | OUTPATIENT
Start: 2022-06-02

## 2022-06-02 RX ORDER — RISPERIDONE 0.5 MG/1
0.5 TABLET, FILM COATED ORAL 2 TIMES DAILY
Qty: 180 TABLET | Refills: 0 | Status: SHIPPED | OUTPATIENT
Start: 2022-06-02 | End: 2022-08-31

## 2022-06-02 RX ORDER — PRAZOSIN HYDROCHLORIDE 1 MG/1
1 CAPSULE ORAL NIGHTLY
Qty: 90 CAPSULE | Refills: 3 | Status: SHIPPED | OUTPATIENT
Start: 2022-06-02

## 2022-06-02 RX ORDER — ASPIRIN 81 MG/1
81 TABLET ORAL DAILY
Qty: 90 TABLET | Refills: 3 | Status: SHIPPED | OUTPATIENT
Start: 2022-06-02

## 2022-06-02 ASSESSMENT — PATIENT HEALTH QUESTIONNAIRE - PHQ9
SUM OF ALL RESPONSES TO PHQ QUESTIONS 1-9: 17
4. FEELING TIRED OR HAVING LITTLE ENERGY: 1
5. POOR APPETITE OR OVEREATING: 3
SUM OF ALL RESPONSES TO PHQ QUESTIONS 1-9: 17
SUM OF ALL RESPONSES TO PHQ QUESTIONS 1-9: 16
8. MOVING OR SPEAKING SO SLOWLY THAT OTHER PEOPLE COULD HAVE NOTICED. OR THE OPPOSITE, BEING SO FIGETY OR RESTLESS THAT YOU HAVE BEEN MOVING AROUND A LOT MORE THAN USUAL: 0
9. THOUGHTS THAT YOU WOULD BE BETTER OFF DEAD, OR OF HURTING YOURSELF: 1
10. IF YOU CHECKED OFF ANY PROBLEMS, HOW DIFFICULT HAVE THESE PROBLEMS MADE IT FOR YOU TO DO YOUR WORK, TAKE CARE OF THINGS AT HOME, OR GET ALONG WITH OTHER PEOPLE: 3
SUM OF ALL RESPONSES TO PHQ9 QUESTIONS 1 & 2: 5
3. TROUBLE FALLING OR STAYING ASLEEP: 1
SUM OF ALL RESPONSES TO PHQ QUESTIONS 1-9: 17
6. FEELING BAD ABOUT YOURSELF - OR THAT YOU ARE A FAILURE OR HAVE LET YOURSELF OR YOUR FAMILY DOWN: 3
2. FEELING DOWN, DEPRESSED OR HOPELESS: 2
7. TROUBLE CONCENTRATING ON THINGS, SUCH AS READING THE NEWSPAPER OR WATCHING TELEVISION: 3
1. LITTLE INTEREST OR PLEASURE IN DOING THINGS: 3

## 2022-06-02 ASSESSMENT — ENCOUNTER SYMPTOMS
NAUSEA: 0
VOMITING: 0
DIARRHEA: 0
CHEST TIGHTNESS: 0
CONSTIPATION: 0
COUGH: 0
SHORTNESS OF BREATH: 0
ABDOMINAL PAIN: 0
WHEEZING: 0
ABDOMINAL DISTENTION: 0
APNEA: 1

## 2022-06-02 ASSESSMENT — COLUMBIA-SUICIDE SEVERITY RATING SCALE - C-SSRS
7. DID THIS OCCUR IN THE LAST THREE MONTHS: NO
6. HAVE YOU EVER DONE ANYTHING, STARTED TO DO ANYTHING, OR PREPARED TO DO ANYTHING TO END YOUR LIFE?: NO
2. HAVE YOU ACTUALLY HAD ANY THOUGHTS OF KILLING YOURSELF?: YES
4. HAVE YOU HAD THESE THOUGHTS AND HAD SOME INTENTION OF ACTING ON THEM?: NO
3. HAVE YOU BEEN THINKING ABOUT HOW YOU MIGHT KILL YOURSELF?: NO
5. HAVE YOU STARTED TO WORK OUT OR WORKED OUT THE DETAILS OF HOW TO KILL YOURSELF? DO YOU INTEND TO CARRY OUT THIS PLAN?: NO
1. WITHIN THE PAST MONTH, HAVE YOU WISHED YOU WERE DEAD OR WISHED YOU COULD GO TO SLEEP AND NOT WAKE UP?: YES

## 2022-06-02 NOTE — PROGRESS NOTES
Visit Information    Have you changed or started any medications since your last visit including any over-the-counter medicines, vitamins, or herbal medicines? no   Have you stopped taking any of your medications? Is so, why? -  no  Are you having any side effects from any of your medications? - no    Have you seen any other physician or provider since your last visit?  no   Have you had any other diagnostic tests since your last visit?  no   Have you been seen in the emergency room and/or had an admission in a hospital since we last saw you?  no   Have you had your routine dental cleaning in the past 6 months?  no     Do you have an active MyChart account? If no, what is the barrier?   Yes    Patient Care Team:  Catrina Mak MD as PCP - General (Family Medicine)  Catrina Mak MD as PCP - [facility] Provider  Manpreet Peña, PhD (Psychology)  STEPHON Britton - NP (Psychiatry)    Medical History Review  Past Medical, Family, and Social History reviewed and does not contribute to the patient presenting condition    Health Maintenance   Topic Date Due    COVID-19 Vaccine (1) Never done    Diabetic foot exam  Never done    Diabetic retinal exam  Never done    Hepatitis B vaccine (1 of 3 - Risk 3-dose series) Never done    Pneumococcal 0-64 years Vaccine (2 - PCV) 12/28/2017    Diabetic microalbuminuria test  06/16/2022    Lipids  06/16/2022    Flu vaccine (Season Ended) 09/01/2022    A1C test (Diabetic or Prediabetic)  03/11/2023    Depression Monitoring  05/17/2023    DTaP/Tdap/Td vaccine (2 - Td or Tdap) 12/28/2026    Hepatitis C screen  Completed    HIV screen  Completed    Hepatitis A vaccine  Aged Out    Hib vaccine  Aged Out    Meningococcal (ACWY) vaccine  Aged Out    Varicella vaccine  Discontinued
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
## 2022-06-02 NOTE — PATIENT INSTRUCTIONS
Patient Education        Learning About Carbohydrate (Carb) Counting and Eating Out When You Have Diabetes  Why plan your meals? Meal planning can be a key part of managing diabetes. Planning meals and snacks with the right balance of carbohydrate, protein, and fat can help you keep yourblood sugar at the target level you set with your doctor. You don't have to eat special foods. You can eat what your family eats, including sweets once in a while. But you do have to pay attention to how oftenyou eat and how much you eat of certain foods. You may want to work with a dietitian or a diabetes educator. They can give you tips and meal ideas and can answer your questions about meal planning. This health professional can also help you reach a healthy weight if that is one ofyour goals. What should you know about eating carbs? Managing the amount of carbohydrate (carbs) you eat is an important part ofhealthy meals when you have diabetes. Carbohydrate is found in many foods.  Learn which foods have carbs. And learn the amounts of carbs in different foods. ? Bread, cereal, pasta, and rice have about 15 grams of carbs in a serving. A serving is 1 slice of bread (1 ounce), ½ cup of cooked cereal, or 1/3 cup of cooked pasta or rice. ? Fruits have 15 grams of carbs in a serving. A serving is 1 small fresh fruit, such as an apple or orange; ½ of a banana; ½ cup of cooked or canned fruit; ½ cup of fruit juice; 1 cup of melon or raspberries; or 2 tablespoons of dried fruit. ? Milk and no-sugar-added yogurt have 15 grams of carbs in a serving. A serving is 1 cup of milk or 3/4 cup (6 oz) of no-sugar-added yogurt. ? Starchy vegetables have 15 grams of carbs in a serving. A serving is ½ cup of mashed potatoes or sweet potato; 1 cup winter squash; ½ of a small baked potato; ½ cup of cooked beans; or ½ cup cooked corn or green peas.    Learn how much carbs to eat each day and at each meal. A dietitian or certified diabetes educator can teach you how to keep track of the amount of carbs you eat. This is called carbohydrate counting.  If you are not sure how to count carbohydrate grams, use the plate method to plan meals. It is a quick way to make sure that you have a balanced meal. It also can help you manage the amount of carbohydrate you eat at meals. ? Divide your plate by types of foods. Put non-starchy vegetables on half the plate, meat or other protein food on one-quarter of the plate, and a grain or starchy vegetable in the final quarter of the plate. To this you can add a small piece of fruit and 1 cup of milk or yogurt, depending on how many carbs you are supposed to eat at a meal.   Try to eat about the same amount of carbs at each meal. Do not \"save up\" your daily allowance of carbs to eat at one meal.   Proteins have very little or no carbs. Examples of proteins are beef, chicken, turkey, fish, eggs, tofu, cheese, cottage cheese, and peanut butter. How can you eat out and still eat healthy?  Learn to estimate the serving sizes of foods that have carbohydrate. If you measure food at home, it will be easier to estimate the amount in a serving of restaurant food.  If the meal you order has too much carbohydrate (such as potatoes, corn, or baked beans), ask to have a low-carbohydrate food instead. Ask for a salad or non-starchy vegetables like broccoli, cauliflower, green beans, or peppers.  If you eat more carbohydrate at a meal than you had planned, take a walk or do other exercise. This will help lower your blood sugar. What are some tips for eating healthy?  Limit saturated fat, such as the fat from meat and dairy products. This is a healthy choice because people who have diabetes are at higher risk of heart disease. So choose lean cuts of meat and nonfat or low-fat dairy products. Use olive or canola oil instead of butter or shortening when cooking.  Don't skip meals.  Your blood sugar may drop too low if you skip meals and take insulin or certain medicines for diabetes.  Check with your doctor before you drink alcohol. Alcohol can cause your blood sugar to drop too low. Alcohol can also cause a bad reaction if you take certain diabetes medicines. Follow-up care is a key part of your treatment and safety. Be sure to make and go to all appointments, and call your doctor if you are having problems. It's also a good idea to know your test results and keep alist of the medicines you take. Where can you learn more? Go to https://[employer]. org and sign in to your Active DSP account. Enter A972 in the [employer] box to learn more about \"Learning About Carbohydrate (Carb) Counting and Eating Out When You Have Diabetes. \"     If you do not have an account, please click on the \"Sign Up Now\" link. Current as of: September 8, 2021               Content Version: 13.2  © 7965-1371 [employer]. Care instructions adapted under license by [employer] ([facility]). If you have questions about a medical condition or this instruction, always ask your healthcare professional. [place] [address] any warranty or liability for your use of this information. New Updates for [facility] Active DSP/ [facility]) FELECIA    Thank you for choosing US to give you the best care! [employer] ([facility]) is always trying to think of new ways to help their patients. We are asking all patients to try out the new digital registration that is now available through your [facility] account or the new FELECIA, [employer] ([facility]). Via the felecia you're now able to update your personal and registration information prior to your upcoming appointment.  This will save you time once you arrive at the office to check-in, not to mention your information remains safe!! Many other perks come from signing up for an account, such as:   Requesting refills   Scheduling an appointment   Completing an E-Visit   Sending a message to the office/provider   Having access to your medication list   Paying your bill/copay prior to your appointment   Scheduling your yearly mammogram   Review your test results    If you are not familiar with [facility] or the [employer] [facility] ([facility]) GUERRERO, please ask one of us and we will be happy to answer any questions or help you set-up your account.       Your [facility] office,  [place]

## 2022-06-08 DIAGNOSIS — F33.1 MODERATE EPISODE OF RECURRENT MAJOR DEPRESSIVE DISORDER (HCC): ICD-10-CM

## 2022-06-08 RX ORDER — BUPROPION HYDROCHLORIDE 150 MG/1
TABLET, EXTENDED RELEASE ORAL
Qty: 60 TABLET | Refills: 3 | Status: SHIPPED | OUTPATIENT
Start: 2022-06-08 | End: 2022-11-04

## 2022-07-13 DIAGNOSIS — F33.1 MODERATE EPISODE OF RECURRENT MAJOR DEPRESSIVE DISORDER (HCC): ICD-10-CM

## 2022-07-13 DIAGNOSIS — I10 ESSENTIAL HYPERTENSION: ICD-10-CM

## 2022-07-13 RX ORDER — SERTRALINE HYDROCHLORIDE 100 MG/1
TABLET, FILM COATED ORAL
Qty: 45 TABLET | Refills: 3 | Status: SHIPPED | OUTPATIENT
Start: 2022-07-13

## 2022-07-13 RX ORDER — FUROSEMIDE 20 MG/1
20-40 TABLET ORAL DAILY
Qty: 60 TABLET | Refills: 3 | Status: SHIPPED | OUTPATIENT
Start: 2022-07-13 | End: 2022-08-12 | Stop reason: SDUPTHER

## 2022-08-12 DIAGNOSIS — I10 ESSENTIAL HYPERTENSION: ICD-10-CM

## 2022-08-12 DIAGNOSIS — K21.9 GASTROESOPHAGEAL REFLUX DISEASE WITHOUT ESOPHAGITIS: ICD-10-CM

## 2022-08-12 RX ORDER — FUROSEMIDE 20 MG/1
20-40 TABLET ORAL DAILY
Qty: 60 TABLET | Refills: 3 | Status: SHIPPED | OUTPATIENT
Start: 2022-08-12

## 2022-08-12 RX ORDER — FAMOTIDINE 20 MG/1
20 TABLET, FILM COATED ORAL 2 TIMES DAILY
Qty: 60 TABLET | Refills: 3 | Status: SHIPPED | OUTPATIENT
Start: 2022-08-12

## 2022-08-31 DIAGNOSIS — F33.1 MODERATE EPISODE OF RECURRENT MAJOR DEPRESSIVE DISORDER (HCC): ICD-10-CM

## 2022-08-31 RX ORDER — RISPERIDONE 0.5 MG/1
TABLET ORAL
Qty: 60 TABLET | Refills: 0 | Status: SHIPPED | OUTPATIENT
Start: 2022-08-31 | End: 2022-09-26 | Stop reason: SDUPTHER

## 2022-09-26 DIAGNOSIS — F33.1 MODERATE EPISODE OF RECURRENT MAJOR DEPRESSIVE DISORDER (HCC): ICD-10-CM

## 2022-09-26 RX ORDER — RISPERIDONE 0.5 MG/1
TABLET, FILM COATED ORAL
Qty: 60 TABLET | Refills: 2 | Status: SHIPPED | OUTPATIENT
Start: 2022-09-26

## 2022-09-26 NOTE — TELEPHONE ENCOUNTER
Please Approve or Refuse.   Send to Pharmacy per Pt's Request:      Next Visit Date:  10/26/2022   Last Visit Date: 6/2/2022    Hemoglobin A1C (%)   Date Value   03/11/2022 6.6 (H)   06/16/2021 5.8   12/02/2020 6.5 (H)             ( goal A1C is < 7)   BP Readings from Last 3 Encounters:   11/04/21 126/76   02/05/21 123/61   12/16/20 134/86          (goal 120/80)  BUN   Date Value Ref Range Status   03/11/2022 17 6 - 20 mg/dL Final     Creatinine   Date Value Ref Range Status   03/11/2022 0.84 0.70 - 1.20 mg/dL Final     Potassium   Date Value Ref Range Status   03/11/2022 4.4 3.7 - 5.3 mmol/L Final

## 2022-10-29 ENCOUNTER — APPOINTMENT (OUTPATIENT)
Dept: GENERAL RADIOLOGY | Age: 42
End: 2022-10-29
Payer: COMMERCIAL

## 2022-10-29 ENCOUNTER — HOSPITAL ENCOUNTER (EMERGENCY)
Age: 42
Discharge: HOME | End: 2022-10-29
Payer: COMMERCIAL

## 2022-10-29 VITALS
SYSTOLIC BLOOD PRESSURE: 113 MMHG | HEART RATE: 85 BPM | RESPIRATION RATE: 20 BRPM | BODY MASS INDEX: 36.45 KG/M2 | DIASTOLIC BLOOD PRESSURE: 70 MMHG | HEIGHT: 78 IN | OXYGEN SATURATION: 95 % | TEMPERATURE: 97.6 F | WEIGHT: 315 LBS

## 2022-10-29 DIAGNOSIS — F41.0 ANXIETY ATTACK: ICD-10-CM

## 2022-10-29 DIAGNOSIS — R07.89 ATYPICAL CHEST PAIN: Primary | ICD-10-CM

## 2022-10-29 LAB
ABSOLUTE EOS #: 0.08 K/UL (ref 0–0.44)
ABSOLUTE IMMATURE GRANULOCYTE: 0.04 K/UL (ref 0–0.3)
ABSOLUTE LYMPH #: 1.59 K/UL (ref 1.1–3.7)
ABSOLUTE MONO #: 0.85 K/UL (ref 0.1–1.2)
ALBUMIN SERPL-MCNC: 4.4 G/DL (ref 3.5–5.2)
ALBUMIN/GLOBULIN RATIO: 1.3 (ref 1–2.5)
ALP BLD-CCNC: 70 U/L (ref 40–129)
ALT SERPL-CCNC: 21 U/L (ref 5–41)
ANION GAP SERPL CALCULATED.3IONS-SCNC: 11 MMOL/L (ref 9–17)
AST SERPL-CCNC: 17 U/L
BASOPHILS # BLD: 1 % (ref 0–2)
BASOPHILS ABSOLUTE: 0.08 K/UL (ref 0–0.2)
BILIRUB SERPL-MCNC: 0.4 MG/DL (ref 0.3–1.2)
BUN BLDV-MCNC: 21 MG/DL (ref 6–20)
BUN/CREAT BLD: 19 (ref 9–20)
CALCIUM SERPL-MCNC: 9.6 MG/DL (ref 8.6–10.4)
CHLORIDE BLD-SCNC: 100 MMOL/L (ref 98–107)
CO2: 27 MMOL/L (ref 20–31)
CREAT SERPL-MCNC: 1.1 MG/DL (ref 0.7–1.2)
D-DIMER QUANTITATIVE: 0.28 MG/L FEU (ref 0–0.59)
EKG ATRIAL RATE: 82 BPM
EKG P AXIS: 36 DEGREES
EKG P-R INTERVAL: 150 MS
EKG Q-T INTERVAL: 354 MS
EKG QRS DURATION: 92 MS
EKG QTC CALCULATION (BAZETT): 413 MS
EKG R AXIS: -5 DEGREES
EKG T AXIS: 36 DEGREES
EKG VENTRICULAR RATE: 82 BPM
EOSINOPHILS RELATIVE PERCENT: 1 % (ref 1–4)
GFR SERPL CREATININE-BSD FRML MDRD: >60 ML/MIN/1.73M2
GLUCOSE BLD-MCNC: 174 MG/DL (ref 70–99)
HCT VFR BLD CALC: 44.9 % (ref 40.7–50.3)
HEMOGLOBIN: 14.8 G/DL (ref 13–17)
IMMATURE GRANULOCYTES: 0 %
LYMPHOCYTES # BLD: 15 % (ref 24–43)
MCH RBC QN AUTO: 28.8 PG (ref 25.2–33.5)
MCHC RBC AUTO-ENTMCNC: 33 G/DL (ref 28.4–34.8)
MCV RBC AUTO: 87.5 FL (ref 82.6–102.9)
MONOCYTES # BLD: 8 % (ref 3–12)
NRBC AUTOMATED: 0 PER 100 WBC
PDW BLD-RTO: 13.3 % (ref 11.8–14.4)
PLATELET # BLD: 232 K/UL (ref 138–453)
PMV BLD AUTO: 10.6 FL (ref 8.1–13.5)
POTASSIUM SERPL-SCNC: 4.8 MMOL/L (ref 3.7–5.3)
RBC # BLD: 5.13 M/UL (ref 4.21–5.77)
SEG NEUTROPHILS: 75 % (ref 36–65)
SEGMENTED NEUTROPHILS ABSOLUTE COUNT: 7.92 K/UL (ref 1.5–8.1)
SODIUM BLD-SCNC: 138 MMOL/L (ref 135–144)
TOTAL PROTEIN: 7.7 G/DL (ref 6.4–8.3)
TROPONIN, HIGH SENSITIVITY: <6 NG/L (ref 0–22)
TSH SERPL DL<=0.05 MIU/L-ACNC: 1.6 UIU/ML (ref 0.3–5)
WBC # BLD: 10.6 K/UL (ref 3.5–11.3)

## 2022-10-29 PROCEDURE — 80053 COMPREHEN METABOLIC PANEL: CPT

## 2022-10-29 PROCEDURE — 85379 FIBRIN DEGRADATION QUANT: CPT

## 2022-10-29 PROCEDURE — 93010 ELECTROCARDIOGRAM REPORT: CPT | Performed by: INTERNAL MEDICINE

## 2022-10-29 PROCEDURE — 85025 COMPLETE CBC W/AUTO DIFF WBC: CPT

## 2022-10-29 PROCEDURE — 84443 ASSAY THYROID STIM HORMONE: CPT

## 2022-10-29 PROCEDURE — 84484 ASSAY OF TROPONIN QUANT: CPT

## 2022-10-29 PROCEDURE — 36415 COLL VENOUS BLD VENIPUNCTURE: CPT

## 2022-10-29 PROCEDURE — 6360000002 HC RX W HCPCS: Performed by: PHYSICIAN ASSISTANT

## 2022-10-29 PROCEDURE — 96375 TX/PRO/DX INJ NEW DRUG ADDON: CPT

## 2022-10-29 PROCEDURE — 71046 X-RAY EXAM CHEST 2 VIEWS: CPT

## 2022-10-29 PROCEDURE — 99285 EMERGENCY DEPT VISIT HI MDM: CPT

## 2022-10-29 PROCEDURE — 93005 ELECTROCARDIOGRAM TRACING: CPT | Performed by: EMERGENCY MEDICINE

## 2022-10-29 PROCEDURE — 96374 THER/PROPH/DIAG INJ IV PUSH: CPT

## 2022-10-29 RX ORDER — LORAZEPAM 2 MG/ML
1 INJECTION INTRAMUSCULAR ONCE
Status: COMPLETED | OUTPATIENT
Start: 2022-10-29 | End: 2022-10-29

## 2022-10-29 RX ORDER — ONDANSETRON 2 MG/ML
4 INJECTION INTRAMUSCULAR; INTRAVENOUS ONCE
Status: COMPLETED | OUTPATIENT
Start: 2022-10-29 | End: 2022-10-29

## 2022-10-29 RX ADMIN — LORAZEPAM 1 MG: 2 INJECTION, SOLUTION INTRAMUSCULAR; INTRAVENOUS at 19:25

## 2022-10-29 RX ADMIN — ONDANSETRON 4 MG: 2 INJECTION INTRAMUSCULAR; INTRAVENOUS at 20:04

## 2022-10-29 ASSESSMENT — ENCOUNTER SYMPTOMS
COUGH: 0
SHORTNESS OF BREATH: 1

## 2022-10-29 NOTE — DISCHARGE INSTRUCTIONS
Follow-up with primary care doctor 7 to 10 days for reevaluation. Avoid known causes of your anxiety. Take Tylenol or Motrin as directed for discomfort. Promptly return to emergency department for new, changing, worsening of symptoms or other concerns.

## 2022-10-29 NOTE — ED PROVIDER NOTES
[facility] [place] ED  EMERGENCY DEPARTMENT ENCOUNTER      Pt Name: Dellie Goldberg  MRN: [id number]  [place] 1980  Date of evaluation: 10/29/2022  Provider: Zain Almaraz Dr       Chief Complaint   Patient presents with    Chest Pain    Shortness of Breath    Palpitations     Pt here for midsternal cp that radiates to the back. HISTORY OF PRESENT ILLNESS   (Location/Symptom, Timing/Onset, Context/Setting, Quality, Duration, Modifying Factors, Severity)  Note limiting factors. Dellie Goldberg is a 43 y.o. male who presents to the emergency department PMH anxiety hypertension hyperlipidemia patient reports he woke at 3 PM today with midsternal chest pain worsened with movement that radiates through to his back similar to prior \"panic attacks\" that has persisted since onset. Patient reports this pain in his chest persisted which precipitated his ER visit today. Patient admits to nausea and vomiting with diaphoresis earlier today earlier today but denies any abdominal pain. Patient denies any chest pain at present syncopal events history of fever cough or other complaints. Upon initial presentation patient answering questions appropriately slightly anxious but demonstrates no acute distress    HPI    Nursing Notes were reviewed. REVIEW OF SYSTEMS    (2-9 systems for level 4, 10 or more for level 5)     Review of Systems   Constitutional: Negative. HENT: Negative. Respiratory:  Positive for shortness of breath. Negative for cough. See hpi   Cardiovascular:  Positive for chest pain. See hpi   Gastrointestinal:         See hpi   Musculoskeletal: Negative. Skin: Negative. Psychiatric/Behavioral:          See hpi     Except as noted above the remainder of the review of systems was reviewed and negative.        PAST MEDICAL HISTORY     Past Medical History:   Diagnosis Date    Anxiety     Chronic left-sided low back pain with left-sided sciatica 11/14/2018    Depression     Erectile dysfunction 5/27/2021    Essential hypertension 7/1/2015    Gastroesophageal reflux disease without esophagitis 10/24/2018    Headache     Hyperglycemia 10/10/2017    Hyperlipidemia     Hypertension     Intermittent explosive disorder in adult 10/24/2017    Irritable bowel syndrome with diarrhea 5/27/2021    Morbid obesity with BMI of 45.0-49.9, adult ([facility] [address] 10/10/2017    Other sleep apnea     Palpitations 4/5/2018    Peripheral edema 3/9/2020    Positive Clay's Sign 10/10/2017    RUQ pain 10/10/2017    Type 2 diabetes mellitus with hyperglycemia, without long-term current use of insulin ([facility] [address] 3/9/2020    Unintended weight loss 2/18/2021         SURGICAL HISTORY       Past Surgical History:   Procedure Laterality Date    TONSILLECTOMY           CURRENT MEDICATIONS       Previous Medications    ALCOHOL SWABS (ALCOHOL PREP) PADS    Use as directed    ASPIRIN EC 81 MG EC TABLET    Take 1 tablet by mouth daily    ATENOLOL (TENORMIN) 100 MG TABLET    Take 1 tablet by mouth daily Dose increased 2/2/2021.for HTN and anxiety. Keep BP bellow 1135/85 and above 115/65, and pulse 56-80    ATORVASTATIN (LIPITOR) 10 MG TABLET    TAKE ONE TABLET BY MOUTH ONCE NIGHTLY    BLOOD GLUCOSE MONITOR STRIPS    Test 2-3 times a day & as needed for symptoms of irregular blood glucose. BUPROPION (WELLBUTRIN SR) 150 MG EXTENDED RELEASE TABLET    TAKE ONE TABLET BY MOUTH TWICE A DAY    FAMOTIDINE (PEPCID) 20 MG TABLET    Take 1 tablet by mouth in the morning and 1 tablet before bedtime. FUROSEMIDE (LASIX) 20 MG TABLET    Take 1-2 tablets by mouth in the morning. Take with potassium.     GLUCOSE MONITORING KIT (FREESTYLE) MONITORING KIT    Use as directed    HYDROXYZINE (ATARAX) 25 MG TABLET    Take 1 tablet by mouth 3 times daily as needed for Anxiety    LANCETS MISC    1 each by Does not apply route 2 times daily    LISINOPRIL (PRINIVIL;ZESTRIL) 40 MG TABLET    Take 1 tablet by mouth daily LOPERAMIDE (IMODIUM) 2 MG CAPSULE    TAKE ONE CAPSULE BY MOUTH FOUR TIMES A DAY AS NEEDED FOR DIARRHEA    METFORMIN (GLUCOPHAGE) 1000 MG TABLET    TAKE ONE TABLET BY MOUTH TWICE A DAY WITH MEALS    POTASSIUM CHLORIDE (KLOR-CON M) 10 MEQ EXTENDED RELEASE TABLET    Take 1 tablet by mouth daily Take with hydrochlorothiazide    PRAZOSIN (MINIPRESS) 1 MG CAPSULE    Take 1 capsule by mouth nightly    RISPERIDONE (RISPERDAL) 0.5 MG TABLET    TAKE ONE TABLET BY MOUTH TWICE A DAY    SERTRALINE (ZOLOFT) 100 MG TABLET    TAKE 1 AND 1/2 TABLET BY MOUTH DAILY    SILDENAFIL (REVATIO) 20 MG TABLET    Take 1 tablet by mouth daily as needed (ED) PLEASE APPLY COUPON    SILDENAFIL (VIAGRA) 100 MG TABLET    Take 1 tablet by mouth daily as needed for Erectile Dysfunction    UNIFINE PENTIPS 31G X 6 MM MISC    Inject 1 each into the skin daily       ALLERGIES     Buspar [buspirone]    FAMILY HISTORY       Family History   Problem Relation Age of Onset    Diabetes Mother     Bipolar Disorder Mother     Diabetes Father     Diabetes Maternal Grandmother     Diabetes Maternal Grandfather     Cancer Maternal Grandfather     Alzheimer's Disease Maternal Grandfather     Diabetes Paternal Grandmother     Heart Disease Paternal Grandmother 54        Approximate age    Diabetes Paternal Grandfather           SOCIAL HISTORY       Social History     Socioeconomic History    Marital status: Single   Tobacco Use    Smoking status: Former     Packs/day: 1.25     Years: 23.00     Pack years: 28.75     Types: Cigarettes     Quit date: 3/26/2017     Years since quittin.5    Smokeless tobacco: Never    Tobacco comments:     Quit smoking cold turkey   Vaping Use    Vaping Use: Never used   Substance and Sexual Activity    Alcohol use: No     Alcohol/week: 0.0 standard drinks    Drug use: No    Sexual activity: Yes     Partners: Female   Social History Narrative    ** Merged History Encounter **            SCREENINGS         [place] Coma Scale  Eye Opening: Spontaneous  Best Verbal Response: Oriented  Best Motor Response: Obeys commands  [place] Coma Scale Score: 15                     CIWA Assessment  BP: 113/70  Heart Rate: 85                 PHYSICAL EXAM    (up to 7 for level 4, 8 or more for level 5)     ED Triage Vitals [10/29/22 1812]   BP Temp Temp Source Heart Rate Resp SpO2 Height Weight   122/73 97.6 °F (36.4 °C) Oral 85 20 96 % 6' 10\" (2.083 m) (!) 470 lb (213.2 kg)       Physical Exam  Vitals and nursing note reviewed. Constitutional:       General: He is not in acute distress. Appearance: He is well-developed. He is not ill-appearing, toxic-appearing or diaphoretic. HENT:      Head: Normocephalic and atraumatic. Eyes:      Extraocular Movements: Extraocular movements intact. Cardiovascular:      Rate and Rhythm: Normal rate and regular rhythm. Pulmonary:      Effort: Pulmonary effort is normal.      Breath sounds: Normal breath sounds. Chest:      Chest wall: No tenderness. Abdominal:      Palpations: Abdomen is soft. Musculoskeletal:         General: Normal range of motion. Cervical back: Normal range of motion and neck supple. Skin:     General: Skin is warm and dry. Capillary Refill: Capillary refill takes less than 2 seconds. Neurological:      General: No focal deficit present. Mental Status: He is alert and oriented to person, place, and time. Psychiatric:         Mood and Affect: Mood normal.         Behavior: Behavior normal.       DIAGNOSTIC RESULTS     EKG: All EKG's are interpreted by the Emergency Department Physician who either signs or Co-signs this chart in the absence of a cardiologist.    EKG demonstrates normal sinus rhythm ventricular rate 82 bpm no acute ST changes.   Suggest ACS QT/QTc 354/413    RADIOLOGY:   Non-plain film images such as CT, Ultrasound and MRI are read by the radiologist. Plain radiographic images are visualized and preliminarily interpreted by the emergency physician with the below findings:        Interpretation per the Radiologist below, if available at the time of this note:    XR CHEST (2 VW)   Final Result   Radiographically clear lungs. ED BEDSIDE ULTRASOUND:   Performed by ED Physician - none    LABS:  Labs Reviewed   COMPREHENSIVE METABOLIC PANEL - Abnormal; Notable for the following components:       Result Value    Glucose 174 (*)     BUN 21 (*)     All other components within normal limits   CBC WITH AUTO DIFFERENTIAL - Abnormal; Notable for the following components:    Seg Neutrophils 75 (*)     Lymphocytes 15 (*)     All other components within normal limits   TSH   TROPONIN   D-DIMER, QUANTITATIVE       All other labs were within normal range or not returned as of this dictation. EMERGENCY DEPARTMENT COURSE and DIFFERENTIAL DIAGNOSIS/MDM:   Vitals:    Vitals:    10/29/22 1818 10/29/22 1828 10/29/22 1850 10/29/22 1910   BP: 130/67 122/65 113/70    Pulse:       Resp:       Temp:       TempSrc:       SpO2: 97% 95% 95%    Weight:    (!) 480 lb 8 oz (218 kg)   Height:               MDM     Amount and/or Complexity of Data Reviewed  Clinical lab tests: ordered and reviewed  Tests in the radiology section of CPT®: ordered and reviewed          REASSESSMENT      Patient is had uncomplicated ER course patient reevaluated 1938 hrs. patient reports improvement of pain at this time resting comfortably playing on cell phone. Patient reevaluated 1955 hrs. patient denies any chest pain at this time. Patient denies any history of protein C or S deficiency or factor V Leiden PERC criteria negative I have no concerns for ACS. reTurn precautions discussed, patient demonstrates no dangerous process. CRITICAL CARE TIME   Total Critical Care time was  minutes, excluding separately reportable procedures. There was a high probability of clinically significant/life threatening deterioration in the patient's condition which required my urgent intervention. CONSULTS:  None    PROCEDURES:  Unless otherwise noted below, none     Procedures        FINAL IMPRESSION      1. Atypical chest pain Stable   2. Anxiety attack Stable         DISPOSITION/PLAN   DISPOSITION Decision To Discharge 10/29/2022 07:56:05 PM      PATIENT REFERRED TO:  No follow-up provider specified. DISCHARGE MEDICATIONS:  New Prescriptions    No medications on file     Controlled Substances Monitoring:     RX Monitoring 4/15/2021   Attestation -   Periodic Controlled Substance Monitoring No signs of potential drug abuse or diversion identified.        (Please note that portions of this note were completed with a voice recognition program.  Efforts were made to edit the dictations but occasionally words are mis-transcribed.)    Evelyn Pappas PA-C (electronically signed)  Attending Emergency Physician           Evelyn Pappas PA-C  10/29/22 1958

## 2022-10-29 NOTE — Clinical Note
Rama Damian was seen and treated in our emergency department on 10/29/2022. He may return to work on 10/30/2022. If you have any questions or concerns, please don't hesitate to call.       Amy Morin PA-C

## 2022-11-03 ENCOUNTER — E-VISIT (OUTPATIENT)
Dept: FAMILY MEDICINE CLINIC | Age: 42
End: 2022-11-03
Payer: COMMERCIAL

## 2022-11-03 DIAGNOSIS — J20.9 ACUTE BRONCHITIS DUE TO INFECTION: Primary | ICD-10-CM

## 2022-11-03 PROCEDURE — 99423 OL DIG E/M SVC 21+ MIN: CPT | Performed by: FAMILY MEDICINE

## 2022-11-03 RX ORDER — COVID-19 MOLECULAR TEST ASSAY
KIT MISCELLANEOUS
Qty: 1 KIT | Refills: 3 | Status: SHIPPED | OUTPATIENT
Start: 2022-11-03

## 2022-11-03 RX ORDER — BENZONATATE 100 MG/1
100 CAPSULE ORAL 3 TIMES DAILY PRN
Qty: 21 CAPSULE | Refills: 0 | Status: SHIPPED | OUTPATIENT
Start: 2022-11-03 | End: 2022-11-10

## 2022-11-03 RX ORDER — AZITHROMYCIN 250 MG/1
TABLET, FILM COATED ORAL
Qty: 6 TABLET | Refills: 0 | Status: SHIPPED | OUTPATIENT
Start: 2022-11-03 | End: 2022-11-08

## 2022-11-03 RX ORDER — ALBUTEROL SULFATE 90 UG/1
2 AEROSOL, METERED RESPIRATORY (INHALATION) EVERY 6 HOURS PRN
Qty: 8 G | Refills: 0 | Status: SHIPPED | OUTPATIENT
Start: 2022-11-03

## 2022-11-03 ASSESSMENT — LIFESTYLE VARIABLES
PACKS_PER_DAY: 1
SMOKING_YEARS: 20
SMOKING_STATUS: NO, BUT I USED TO SMOKE

## 2022-11-03 NOTE — PROGRESS NOTES
Patricio Johns (1980) initiated an asynchronous digital communication through [address] [place] [address]. HPI: per patient's questionnaire    EXAM: not applicable    Diagnoses and all orders for this visit:    1. Acute bronchitis due to infection  Worsening  Patient reports exposure to influenza, symptoms have been present for a week, not feeling well, has productive cough with thick and yellow-green mucus  Patient reports feeling really sick, will rule out pneumonia      - azithromycin (ZITHROMAX) 250 MG tablet; 500 mg orally on day one followed by 250 mg daily on days two through five  Dispense: 6 tablet; Refill: 0  - benzonatate (TESSALON PERLES) 100 MG capsule; Take 1 capsule by mouth 3 times daily as needed for Cough  Dispense: 21 capsule; Refill: 0  - albuterol sulfate HFA (PROVENTIL HFA) 108 (90 Base) MCG/ACT inhaler; Inhale 2 puffs into the lungs every 6 hours as needed for Wheezing or Shortness of Breath Okay to substitute  Dispense: 8 g; Refill: 0  - XR CHEST (2 VW); Future  Will rule out COVID-19 infection however he is out of the window for Paxlovid    Orders Placed This Encounter   Procedures    XR CHEST (2 VW)     Standing Status:   Future     Standing Expiration Date:   11/3/2023     Order Specific Question:   Reason for exam:     Answer:   Cough         Patient was advised to contact PCP if symptoms worsen or failing to change as expected    Addendum on 11/3/2022 at 3:48 PM, letter for work placed             [facility]  [address] [place] [id number]  Phone: [phone]  Fax: [phone]    Angélica Carlson MD        November 3, 2022     Patient: Patricio Johns   YOB: 1980   Date of Visit: 11/3/2022       To Whom it May Concern:    Samia Waddell was evaluated by E-visit on 11/3/2022. He should be excused from work October 31, November 1, November 2 and November 3    Could return back to work on November 4, 2022 to full duty.     1. Acute bronchitis due to infection          If you have any questions or concerns, please don't hesitate to call. Sincerely,         Nancy Varela MD         More than 21  minutes were spent on the digital evaluation and management of this patient.   Electronically signed by Nancy Varela MD on 11/3/22 at  12:39 PM

## 2022-11-03 NOTE — LETTER
[facility]  [id number] [facility] [address]  Phone: [phone]  Fax: [phone]    Shahbaz Adan MD        November 3, 2022     Patient: Juan Alberto Saavedra   YOB: 1980   Date of Visit: 11/3/2022       To Whom it May Concern:    Rom Dickerson was evaluated by E-visit on 11/3/2022. He should be excused from work October 31, November 1, November 2 and November 3    Could return back to work on November 4, 2022 to full duty. 1. Acute bronchitis due to infection          If you have any questions or concerns, please don't hesitate to call.     Sincerely,         Shahbaz Adan MD

## 2022-11-04 DIAGNOSIS — F33.1 MODERATE EPISODE OF RECURRENT MAJOR DEPRESSIVE DISORDER (HCC): ICD-10-CM

## 2022-11-04 RX ORDER — BUPROPION HYDROCHLORIDE 150 MG/1
TABLET, EXTENDED RELEASE ORAL
Qty: 60 TABLET | Refills: 3 | Status: SHIPPED | OUTPATIENT
Start: 2022-11-04

## 2022-11-04 NOTE — TELEPHONE ENCOUNTER
Please Approve or Refuse.   Send to Pharmacy per Pt's Request:      Next Visit Date:  11/29/2022   Last Visit Date: 11/3/2022    Hemoglobin A1C (%)   Date Value   03/11/2022 6.6 (H)   06/16/2021 5.8   12/02/2020 6.5 (H)             ( goal A1C is < 7)   BP Readings from Last 3 Encounters:   10/29/22 113/70   11/04/21 126/76   02/05/21 123/61          (goal 120/80)  BUN   Date Value Ref Range Status   10/29/2022 21 (H) 6 - 20 mg/dL Final     Creatinine   Date Value Ref Range Status   10/29/2022 1.10 0.70 - 1.20 mg/dL Final     Potassium   Date Value Ref Range Status   10/29/2022 4.8 3.7 - 5.3 mmol/L Final

## 2022-11-25 DIAGNOSIS — E78.5 HYPERLIPIDEMIA WITH TARGET LDL LESS THAN 100: ICD-10-CM

## 2022-11-25 DIAGNOSIS — F33.1 MODERATE EPISODE OF RECURRENT MAJOR DEPRESSIVE DISORDER (HCC): ICD-10-CM

## 2022-11-25 RX ORDER — ATORVASTATIN CALCIUM 10 MG/1
TABLET, FILM COATED ORAL
Qty: 90 TABLET | Refills: 0 | Status: SHIPPED | OUTPATIENT
Start: 2022-11-25

## 2022-11-25 RX ORDER — SERTRALINE HYDROCHLORIDE 100 MG/1
TABLET, FILM COATED ORAL
Qty: 45 TABLET | Refills: 3 | Status: SHIPPED | OUTPATIENT
Start: 2022-11-25

## 2022-11-25 NOTE — TELEPHONE ENCOUNTER
Last seen 6/2/2022. Next appt 11/29/2022. Requested Prescriptions     Pending Prescriptions Disp Refills    atorvastatin (LIPITOR) 10 MG tablet [Pharmacy Med Name: ATORVASTATIN 10 MG TABLET] 90 tablet 3     Sig: TAKE ONE TABLET BY MOUTH ONCE NIGHTLY     Next Visit Date:  Future Appointments   Date Time Provider Sejal Roemro   11/29/2022 11:15 AM Eri Fernandez MD [address] Maintenance   Topic Date Due    COVID-19 Vaccine (1) Never done    Diabetic foot exam  Never done    Diabetic retinal exam  Never done    Hepatitis B vaccine (1 of 3 - Risk 3-dose series) Never done    Diabetic microalbuminuria test  06/16/2022    Lipids  06/16/2022    Flu vaccine (1) 08/01/2022    A1C test (Diabetic or Prediabetic)  03/11/2023    Depression Monitoring  06/02/2023    DTaP/Tdap/Td vaccine (2 - Td or Tdap) 12/28/2026    Pneumococcal 0-64 years Vaccine  Completed    Hepatitis C screen  Completed    HIV screen  Completed    Hepatitis A vaccine  Aged Out    Hib vaccine  Aged Out    Meningococcal (ACWY) vaccine  Aged Out    Varicella vaccine  Discontinued       Hemoglobin A1C (%)   Date Value   03/11/2022 6.6 (H)   06/16/2021 5.8   12/02/2020 6.5 (H)             ( goal A1C is < 7)   Microalb/Crt.  Ratio (mcg/mg creat)   Date Value   06/16/2021 CANNOT BE CALCULATED     LDL Cholesterol (mg/dL)   Date Value   06/16/2021 58       (goal LDL is <100)   AST (U/L)   Date Value   10/29/2022 17     ALT (U/L)   Date Value   10/29/2022 21     BUN (mg/dL)   Date Value   10/29/2022 21 (H)     BP Readings from Last 3 Encounters:   10/29/22 113/70   11/04/21 126/76   02/05/21 123/61          (goal 120/80)    All Future Testing planned in [facility]  Lab Frequency Next Occurrence   COVID-19 Once 61/61/8142   Basic Metabolic Panel Once 25/19/6350   XR CHEST (2 VW) Once 11/03/2022               Patient Active Problem List:     Essential hypertension     Anxiety     Chronic fatigue     Snoring     Atypical chest pain     Family history of cardiac disorder in paternal grandmother     Ex-smoker     Moderate episode of recurrent major depressive disorder ([facility] [address]     Morbid obesity with BMI of 50.0-59.9, adult ([facility])     Hyperlipidemia with target LDL less than 100     Hypertriglyceridemia     Intermittent explosive disorder in adult     Psychophysiological insomnia     Leucocytosis     Other sleep apnea     Atypical mole multiple     Gastroesophageal reflux disease without esophagitis     Chronic left-sided low back pain with left-sided sciatica     Pain of left hip joint     Genital candidiasis in male     Type 2 diabetes mellitus with hyperglycemia, without long-term current use of insulin ([facility])     Major depressive disorder, recurrent episode with anxious distress ([facility] [address]     Psychological trauma history     Post traumatic stress disorder (PTSD)     Irritable bowel syndrome with diarrhea     Erectile dysfunction     History of 2019 novel coronavirus disease (COVID-19)     COVID-19 vaccination declined

## 2022-12-08 DIAGNOSIS — J20.9 ACUTE BRONCHITIS DUE TO INFECTION: ICD-10-CM

## 2022-12-08 DIAGNOSIS — E11.65 TYPE 2 DIABETES MELLITUS WITH HYPERGLYCEMIA, WITHOUT LONG-TERM CURRENT USE OF INSULIN (HCC): ICD-10-CM

## 2022-12-08 DIAGNOSIS — K21.9 GASTROESOPHAGEAL REFLUX DISEASE WITHOUT ESOPHAGITIS: ICD-10-CM

## 2022-12-08 RX ORDER — ALBUTEROL SULFATE 90 UG/1
AEROSOL, METERED RESPIRATORY (INHALATION)
Qty: 18 G | Refills: 2 | Status: SHIPPED | OUTPATIENT
Start: 2022-12-08

## 2022-12-08 RX ORDER — FAMOTIDINE 20 MG/1
TABLET, FILM COATED ORAL
Qty: 60 TABLET | Refills: 3 | Status: SHIPPED | OUTPATIENT
Start: 2022-12-08 | End: 2022-12-27

## 2022-12-08 NOTE — TELEPHONE ENCOUNTER
Please Approve or Refuse.   Send to Pharmacy per Pt's Request: Janie Herrera     Next Visit Date:  Visit date not found   Last Visit Date: 11/3/2022    Hemoglobin A1C (%)   Date Value   03/11/2022 6.6 (H)   06/16/2021 5.8   12/02/2020 6.5 (H)             ( goal A1C is < 7)   BP Readings from Last 3 Encounters:   10/29/22 113/70   11/04/21 126/76   02/05/21 123/61          (goal 120/80)  BUN   Date Value Ref Range Status   10/29/2022 21 (H) 6 - 20 mg/dL Final     Creatinine   Date Value Ref Range Status   10/29/2022 1.10 0.70 - 1.20 mg/dL Final     Potassium   Date Value Ref Range Status   10/29/2022 4.8 3.7 - 5.3 mmol/L Final

## 2022-12-08 NOTE — TELEPHONE ENCOUNTER
Ludie Meigs is calling to request a refill on the following medication(s):    Medication Request:  Requested Prescriptions     Pending Prescriptions Disp Refills    albuterol sulfate HFA (PROVENTIL;VENTOLIN;PROAIR) 108 (90 Base) MCG/ACT inhaler [Pharmacy Med Name: ALBUTEROL HFA 90 MCG INHALER] 18 g      Sig: INHALE TWO PUFFS BY MOUTH EVERY 6 HOURS AS NEEDED FOR WHEEZING OR FOR SHORTNESS OF BREATH    famotidine (PEPCID) 20 MG tablet [Pharmacy Med Name: FAMOTIDINE 20 MG TABLET] 60 tablet 3     Sig: TAKE ONE TABLET BY MOUTH EVERY MORNING AND TAKE ONE TABLET BY MOUTH EVERY NIGHT BEFORE BEDTIME    metFORMIN (GLUCOPHAGE) 1000 MG tablet [Pharmacy Med Name: metFORMIN HCL 1,000 MG TABLET] 180 tablet 4     Sig: TAKE ONE TABLET BY MOUTH TWICE A DAY WITH MEALS       Last Visit Date (If Applicable):  51/5/8424    Next Visit Date:    Visit date not found

## 2022-12-09 DIAGNOSIS — J20.9 ACUTE BRONCHITIS DUE TO INFECTION: ICD-10-CM

## 2022-12-09 RX ORDER — BENZONATATE 100 MG/1
CAPSULE ORAL
Qty: 21 CAPSULE | Refills: 0 | Status: SHIPPED | OUTPATIENT
Start: 2022-12-09

## 2022-12-09 NOTE — TELEPHONE ENCOUNTER
Please Approve or Refuse.   Send to Pharmacy per Pt's Request:      Next Visit Date:  Visit date not found   Last Visit Date: 11/3/2022    Hemoglobin A1C (%)   Date Value   03/11/2022 6.6 (H)   06/16/2021 5.8   12/02/2020 6.5 (H)             ( goal A1C is < 7)   BP Readings from Last 3 Encounters:   10/29/22 113/70   11/04/21 126/76   02/05/21 123/61          (goal 120/80)  BUN   Date Value Ref Range Status   10/29/2022 21 (H) 6 - 20 mg/dL Final     Creatinine   Date Value Ref Range Status   10/29/2022 1.10 0.70 - 1.20 mg/dL Final     Potassium   Date Value Ref Range Status   10/29/2022 4.8 3.7 - 5.3 mmol/L Final

## 2022-12-11 DIAGNOSIS — F33.1 MODERATE EPISODE OF RECURRENT MAJOR DEPRESSIVE DISORDER (HCC): ICD-10-CM

## 2022-12-12 RX ORDER — RISPERIDONE 0.5 MG/1
TABLET ORAL
Qty: 60 TABLET | Refills: 0 | Status: SHIPPED | OUTPATIENT
Start: 2022-12-12

## 2022-12-23 DIAGNOSIS — K21.9 GASTROESOPHAGEAL REFLUX DISEASE WITHOUT ESOPHAGITIS: ICD-10-CM

## 2022-12-27 DIAGNOSIS — E78.5 HYPERLIPIDEMIA WITH TARGET LDL LESS THAN 100: ICD-10-CM

## 2022-12-27 DIAGNOSIS — I10 ESSENTIAL HYPERTENSION: Primary | ICD-10-CM

## 2022-12-27 DIAGNOSIS — E11.65 TYPE 2 DIABETES MELLITUS WITH HYPERGLYCEMIA, WITHOUT LONG-TERM CURRENT USE OF INSULIN (HCC): ICD-10-CM

## 2022-12-27 RX ORDER — FAMOTIDINE 20 MG/1
TABLET, FILM COATED ORAL
Qty: 60 TABLET | Refills: 3 | Status: SHIPPED | OUTPATIENT
Start: 2022-12-27

## 2022-12-27 NOTE — TELEPHONE ENCOUNTER
Needs to do his blood work in [facility], blood work is overdue    Also to schedule his next appointment

## 2022-12-27 NOTE — PROGRESS NOTES
All his labs      Diagnosis Orders   1. Essential hypertension  CBC    Comprehensive Metabolic Panel    Uric Acid    TSH    Magnesium      2. Type 2 diabetes mellitus with hyperglycemia, without long-term current use of insulin (HCC)  CBC    Comprehensive Metabolic Panel    Hemoglobin A1C    Vitamin B12 & Folate    Uric Acid    TSH    Microalbumin / Creatinine Urine Ratio    Magnesium      3. Hyperlipidemia with target LDL less than 100  Lipid Panel           No future appointments.

## 2023-02-27 DIAGNOSIS — E78.5 HYPERLIPIDEMIA WITH TARGET LDL LESS THAN 100: ICD-10-CM

## 2023-02-28 RX ORDER — ATORVASTATIN CALCIUM 10 MG/1
TABLET, FILM COATED ORAL
Qty: 90 TABLET | Refills: 0 | OUTPATIENT
Start: 2023-02-28

## 2023-03-20 DIAGNOSIS — I10 ESSENTIAL HYPERTENSION: ICD-10-CM

## 2023-03-20 DIAGNOSIS — E11.65 TYPE 2 DIABETES MELLITUS WITH HYPERGLYCEMIA, WITHOUT LONG-TERM CURRENT USE OF INSULIN (HCC): ICD-10-CM

## 2023-03-20 RX ORDER — FUROSEMIDE 20 MG/1
TABLET ORAL
Qty: 60 TABLET | Refills: 0 | Status: SHIPPED | OUTPATIENT
Start: 2023-03-20

## 2023-03-20 NOTE — TELEPHONE ENCOUNTER
Please Approve or Refuse.   Send to Pharmacy per Pt's Request: latoya      Next Visit Date:  Virgance message sent  Last Visit Date: 11/3/2022    Hemoglobin A1C (%)   Date Value   03/11/2022 6.6 (H)   06/16/2021 5.8   12/02/2020 6.5 (H)             ( goal A1C is < 7)   BP Readings from Last 3 Encounters:   10/29/22 113/70   11/04/21 126/76   02/05/21 123/61          (goal 120/80)  BUN   Date Value Ref Range Status   10/29/2022 21 (H) 6 - 20 mg/dL Final     Creatinine   Date Value Ref Range Status   10/29/2022 1.10 0.70 - 1.20 mg/dL Final     Potassium   Date Value Ref Range Status   10/29/2022 4.8 3.7 - 5.3 mmol/L Final

## 2023-03-21 RX ORDER — FUROSEMIDE 20 MG/1
TABLET ORAL
Qty: 60 TABLET | Refills: 0 | OUTPATIENT
Start: 2023-03-21

## 2023-03-31 DIAGNOSIS — F33.1 MODERATE EPISODE OF RECURRENT MAJOR DEPRESSIVE DISORDER (HCC): ICD-10-CM

## 2023-03-31 RX ORDER — BUPROPION HYDROCHLORIDE 150 MG/1
TABLET, EXTENDED RELEASE ORAL
Qty: 60 TABLET | Refills: 3 | OUTPATIENT
Start: 2023-03-31

## 2023-03-31 NOTE — TELEPHONE ENCOUNTER
Please advise patient I can refer him to Dr. Gabriel Mcdonald so it is easier for him, he is very good Dr. Donna Montana, he needs to be seen in person please let me know if he agrees and I can place referral then I will refill his medications he needs to be seen

## 2023-04-24 DIAGNOSIS — F33.1 MODERATE EPISODE OF RECURRENT MAJOR DEPRESSIVE DISORDER (HCC): ICD-10-CM

## 2023-04-24 RX ORDER — BUPROPION HYDROCHLORIDE 150 MG/1
TABLET, EXTENDED RELEASE ORAL
Qty: 60 TABLET | Refills: 3 | OUTPATIENT
Start: 2023-04-24

## 2023-05-08 NOTE — ED PROVIDER NOTES
"    [facility] - Clinic Note  Page Hernandez MD      Patient Name: Rajesh Quintanilla     MRN: [id number]   Visit Date: 05/09/2023    Patient Care Team:  Brett Taveras MD as PCP - General (Internal Medicine)  MD Chriss Bravo Jr., MD as Consulting Physician (Cardiology)    Subjective:         Carotid Artery Disease      HPI: Mr. Quintanilla presents to the clinic for 6 month follow up of carotid artery disease. Carotid duplex obtained today. He denies signs or symptoms of stroke or TIA.       Past Medical History:   Diagnosis Date    Arthritis     CAD (coronary artery disease)     Essential (primary) hypertension     Hyperlipidemia     Hypothyroidism, unspecified     Vitamin D deficiency      History reviewed. No pertinent surgical history.    Family History   Problem Relation Age of Onset    Heart disease Mother     Stroke Father      Social History     Socioeconomic History    Marital status:    Tobacco Use    Smoking status: Former     Types: Cigarettes    Smokeless tobacco: Never   Substance and Sexual Activity    Alcohol use: Yes    Drug use: Never     Current Outpatient Medications   Medication Instructions    amLODIPine (NORVASC) 5 MG tablet TAKE ONE TABLET BY MOUTH EVERY NIGHT AT BEDTIME    aspirin (ECOTRIN) 81 mg, Oral, Daily    finasteride (PROSCAR) 5 mg, Oral, Daily    latanoprost 0.005 % ophthalmic solution 1 drop, Both Eyes, Nightly    levothyroxine (SYNTHROID) 100 MCG tablet TAKE 1 TABLET BY MOUTH EVERY MORNING    losartan (COZAAR) 50 MG tablet TAKE 1 TABLET BY MOUTH ONCE DAILY    rosuvastatin (CRESTOR) 40 mg, Oral, Daily     Review of patient's allergies indicates:  No Known Allergies        REVIEW OF SYSTEMS:  ROS  12 point review of systems conducted, negative except as stated in the history of present illness. See HPI for details.      Objective:     PHYSICAL EXAM:   Visit Vitals  BP (!) 147/76 (BP Location: Right arm)   Pulse 67   Ht 6' 3" (1.905 m)   Wt 99.8 kg (220 lb) "   BMI 27.50 kg/m²       Vascular Physical Exam  Vitals and nursing note reviewed.   Constitutional:       General: He is not in acute distress.  HENT:      Head: Normocephalic.      Nose: Nose normal.   Cardiovascular:      Rate and Rhythm: Normal rate and regular rhythm.      Pulses:           Radial pulses are 2+ on the right side and 2+ on the left side.   Abdominal:      General: There is no distension.      Tenderness: There is no abdominal tenderness.   Musculoskeletal:      Right lower leg: No edema.      Left lower leg: No edema.   Lymphadenopathy:      Cervical: No cervical adenopathy.   Neurological:      General: No focal deficit present.      Mental Status: He is alert.      Cranial Nerves: No cranial nerve deficit.   Psychiatric:         Mood and Affect: Mood normal.             Imaging Obtained/Reviewed:   Study: carotid duplex  Date: 5/9/23        Assessment/Plan:     Mr. Quintanilla is a 89 y.o. man with asymptomatic carotid artery stenosis. Carotid duplex obtained today demonstrates RIGHT carotid with mild with a peak systolic velocity of 58 cm/s. LEFT carotid demonstrates mild with a peak systolic velocity of 48 cm/s (previous trends demonstrates more moderate stenosis to his left internal carotid artery). Recommend follow up in 1 year follow up with repeat carotid duplex. Continue Aspirin and statin therapy.        1. Bilateral carotid artery occlusion         No follow-ups on file. In addition to their scheduled follow up, the patient has also been instructed to follow up on as needed basis.     Future Appointments   Date Time Provider Department Center   6/5/2023 10:00 AM Brett Taveras MD [place] [id number]         Diagnosis Date    Anxiety     Chronic left-sided low back pain with left-sided sciatica 11/14/2018    Depression     Erectile dysfunction 5/27/2021    Essential hypertension 7/1/2015    Gastroesophageal reflux disease without esophagitis 10/24/2018    Headache     Hyperglycemia 10/10/2017    Hyperlipidemia     Hypertension     Intermittent explosive disorder in adult 10/24/2017    Irritable bowel syndrome with diarrhea 5/27/2021    Morbid obesity with BMI of 45.0-49.9, adult ([facility] [address] 10/10/2017    Palpitations 4/5/2018    Peripheral edema 3/9/2020    Positive Clay's Sign 10/10/2017    RUQ pain 10/10/2017    Type 2 diabetes mellitus with hyperglycemia, without long-term current use of insulin ([facility] [address] 3/9/2020    Unintended weight loss 2/18/2021         SURGICAL HISTORY       Past Surgical History:   Procedure Laterality Date    TONSILLECTOMY           CURRENT MEDICATIONS       Discharge Medication List as of 11/4/2021  8:01 PM      CONTINUE these medications which have NOT CHANGED    Details   risperiDONE (RISPERDAL) 0.5 MG tablet TAKE ONE TABLET BY MOUTH TWICE A DAY, Disp-60 tablet, R-0Normal      prazosin (MINIPRESS) 1 MG capsule TAKE ONE CAPSULE BY MOUTH ONCE NIGHTLY, Disp-30 capsule, R-0Normal      buPROPion (WELLBUTRIN SR) 150 MG extended release tablet TAKE ONE TABLET BY MOUTH TWICE A DAY, Disp-60 tablet, R-3Normal      sertraline (ZOLOFT) 100 MG tablet TAKE 1 AND 1/2 TABLET BY MOUTH DAILY, Disp-45 tablet, R-0Normal      famotidine (PEPCID) 20 MG tablet TAKE ONE TABLET BY MOUTH TWICE A DAY, Disp-60 tablet, R-5Normal      hydrALAZINE (APRESOLINE) 10 MG tablet TAKE ONE TABLET BY MOUTH THREE TIMES A DAY TO KEEP BLOOD PRESSURE BELOW 138/85 AND ABOVE 115/65, Disp-90 tablet, R-3Normal      loperamide (IMODIUM) 2 MG capsule TAKE ONE CAPSULE BY MOUTH FOUR TIMES A DAY AS NEEDED FOR DIARRHEA, Disp-30 capsule, R-2Normal      !! Blood Pressure KIT Disp-1 kit, R-0, PrintDiagnosis: HTN.  Needs to check blood pressure 1-2 times a day until stable, then once a day. Goal blood pressure less than 135/85, and above 110/60.      hydroCHLOROthiazide (HYDRODIURIL) 25 MG tablet Take 1 tablet by mouth daily **ignore RX for Furosemide**, Disp-90 tablet, R-3Normal      metFORMIN (GLUCOPHAGE) 1000 MG tablet TAKE ONE TABLET BY MOUTH TWICE A DAY WITH MEALS, Disp-180 tablet, R-4Normal      tadalafil (CIALIS) 5 MG tablet Take 1 tablet by mouth daily as needed for Erectile Dysfunction, Disp-30 tablet, R-5Normal      hydrOXYzine (ATARAX) 25 MG tablet Take 1 tablet by mouth 3 times daily as needed for Anxiety, Disp-90 tablet, R-0Normal      traZODone (DESYREL) 50 MG tablet Take 0.5 tablets by mouth nightly as needed for Sleep, Disp-15 tablet, R-0Normal      atenolol (TENORMIN) 100 MG tablet Take 1 tablet by mouth daily Dose increased 2/2/2021.for HTN and anxiety. Keep BP bellow 1135/85 and above 115/65, and pulse 56-80, Disp-90 tablet, R-3Normal      atorvastatin (LIPITOR) 10 MG tablet Take 1 tablet by mouth nightly, Disp-90 tablet,R-3Normal      aspirin EC 81 MG EC tablet Take 1 tablet by mouth daily, Disp-90 tablet,R-3Normal      lisinopril (PRINIVIL;ZESTRIL) 40 MG tablet Take 1 tablet by mouth daily, Disp-90 tablet,R-3Normal      potassium chloride (KLOR-CON M) 10 MEQ extended release tablet Take 1 tablet by mouth daily, Disp-90 tablet,R-3Normal      UNIFINE PENTIPS 31G X 6 MM MISC DAILY Starting Tue 12/1/2020, Disp-100 each,R-3,SARAH, Normal      blood glucose monitor strips Test 2-3 times a day & as needed for symptoms of irregular blood glucose., Disp-100 strip,R-5, Normal      Alcohol Swabs (ALCOHOL PREP) PADS Disp-100 each,R-5, NormalUse as directed      Lancets MISC 2 TIMES DAILY Starting Tue 12/1/2020, Disp-300 each,R-1, Normal      !! Blood Pressure Monitor KIT Disp-1 kit, R-1, PrintUse as directed.       glucose monitoring kit (FREESTYLE) monitoring kit Disp-1 kit, R-0, NormalUse as directed       !! - Potential duplicate medications found. Please discuss with provider. ALLERGIES     Buspar [buspirone]    FAMILY HISTORY       Family History   Problem Relation Age of Onset    Diabetes Mother     Bipolar Disorder Mother     Diabetes Father     Diabetes Maternal Grandmother     Diabetes Maternal Grandfather     Cancer Maternal Grandfather     Alzheimer's Disease Maternal Grandfather     Diabetes Paternal Grandmother     Heart Disease Paternal Grandmother 54        Approximate age   Edward Bautista Diabetes Paternal Grandfather           SOCIAL HISTORY       Social History     Socioeconomic History    Marital status: Single     Spouse name: Not on file    Number of children: Not on file    Years of education: Not on file    Highest education level: Not on file   Occupational History    Not on file   Tobacco Use    Smoking status: Former Smoker     Packs/day: 1.25     Years: 23.00     Pack years: 28.75     Types: Cigarettes     Quit date: 3/26/2017     Years since quittin.6    Smokeless tobacco: Never Used    Tobacco comment: Quit smoking cold turkey   Vaping Use    Vaping Use: Never used   Substance and Sexual Activity    Alcohol use: No     Alcohol/week: 0.0 standard drinks    Drug use: No    Sexual activity: Yes     Partners: Female   Other Topics Concern    Not on file   Social History Narrative    ** Merged History Encounter **          Social Determinants of Health     Financial Resource Strain: High Risk    Difficulty of Paying Living Expenses: Hard   Food Insecurity: Food Insecurity Present    Worried About Running Out of Food in the Last Year: Sometimes true    [place] of Food in the Last Year: Sometimes true   Transportation Needs:     Lack of Transportation (Medical):      Lack of Transportation (Non-Medical):    Physical Activity:     Days of Exercise per Week:     Minutes of Exercise per Session:    Stress:     Feeling of Stress :    Social Connections:     Frequency of Communication with Friends and Family:     Frequency of Social Gatherings with Friends and Family:     Attends [employer] Services:     Active Member of Clubs or Organizations:     Attends Club or Organization Meetings:     Marital Status:    Intimate Partner Violence:     Fear of Current or Ex-Partner:     Emotionally Abused:     Physically Abused:     Sexually Abused:        SCREENINGS                        PHYSICAL EXAM    (up to 7 for level 4, 8 or more for level 5)     ED Triage Vitals [11/04/21 1746]   BP Temp Temp Source Pulse Resp SpO2 Height Weight   126/76 97.8 °F (36.6 °C) Oral 74 18 97 % 6' 10\" (2.083 m) (!) 520 lb (235.9 kg)       Physical Exam  Vitals and nursing note reviewed. Constitutional:       General: He is not in acute distress. Appearance: He is obese. He is not toxic-appearing. Comments: Patient is sitting in bed comfortably upon exam.  He answers questions appropriately in full sentences. HENT:      Head: Atraumatic. Mouth/Throat:      Comments: No obvious intraoral lesions noted. Eyes:      Extraocular Movements: Extraocular movements intact. Pupils: Pupils are equal, round, and reactive to light. Cardiovascular:      Rate and Rhythm: Normal rate and regular rhythm. Pulmonary:      Effort: Pulmonary effort is normal.      Breath sounds: Normal breath sounds. Abdominal:      General: Bowel sounds are normal.      Palpations: Abdomen is soft. Musculoskeletal:         General: Normal range of motion. Cervical back: Normal range of motion and neck supple. Skin:     General: Skin is warm and dry. Capillary Refill: Capillary refill takes less than 2 seconds. Neurological:      General: No focal deficit present. Mental Status: He is alert.    Psychiatric:         Mood and Affect: Mood normal.         DIAGNOSTIC RESULTS     EKG: All EKG's are interpreted by the Emergency Department Physician who either signs or Co-signs this chart in the absence of a cardiologist.        RADIOLOGY:   Non-plain film images such as CT, Ultrasound and MRI are read by the radiologist. Plain radiographic images are visualized and preliminarily interpreted by the emergency physician with the below findings:        Interpretation per the Radiologist below, if available at the time of this note:    No orders to display         ED BEDSIDE ULTRASOUND:   Performed by ED Physician - none    LABS:  Labs Reviewed - No data to display    All other labs were within normal range or not returned as of this dictation. EMERGENCY DEPARTMENT COURSE and DIFFERENTIAL DIAGNOSIS/MDM:   Vitals:    Vitals:    11/04/21 1746   BP: 126/76   Pulse: 74   Resp: 18   Temp: 97.8 °F (36.6 °C)   TempSrc: Oral   SpO2: 97%   Weight: (!) 520 lb (235.9 kg)   Height: 6' 10\" (2.083 m)           MDM  Number of Diagnoses or Management Options  Pain, dental: new and does not require workup     Amount and/or Complexity of Data Reviewed  Tests in the medicine section of CPT®: ordered  Review and summarize past medical records: yes  Independent visualization of images, tracings, or specimens: yes    Risk of Complications, Morbidity, and/or Mortality  Presenting problems: minimal  Diagnostic procedures: minimal  Management options: minimal    Critical Care  Total time providing critical care: < 30 minutes    Patient Progress  Patient progress: improved        REASSESSMENT     ED Course as of Nov 04 2017   Thu Nov 04, 2021   Alexey Winkler is a 70-year-old male who presents with right lower wisdom tooth pain for 3 days. Patient states he has been taking Motrin with pain relief. He thinks that his wisdom teeth are coming in and causing the pain. He does state he has a bad tooth as well. It has been a while since he saw a dentist.  He denies any current pain. His wife sent him here for an antibiotic. He denies any fevers, cough, vomiting. He does report diarrhea due to his chronic anxiety.   Upon arrival the patient's vital signs within normal limits. Physical exam is grossly unremarkable. Patient is given a prescription for Augmentin. He is discharged and counseled to follow-up with a dentist.  He declines pain medication at this time. All questions were answered. He agrees with plan. Strict return precautions are given.    [AB]      ED Course User Index  [AB] Pura Goodell, DO         CRITICAL CARE TIME   Total Critical Care time was 0 minutes, excluding separately reportable procedures. There was a high probability of clinically significant/life threatening deterioration in the patient's condition which required my urgent intervention. CONSULTS:  None    PROCEDURES:  Unless otherwise noted below, none     Procedures        FINAL IMPRESSION      1. Pain, dental          DISPOSITION/PLAN   DISPOSITION Decision To Discharge 11/04/2021 07:54:38 PM      PATIENT REFERRED TO:  Alexandrea Tavera MD  [address] [place] [address]  [phone]    Schedule an appointment as soon as possible for a visit         DISCHARGE MEDICATIONS:  Discharge Medication List as of 11/4/2021  8:01 PM      START taking these medications    Details   amoxicillin-clavulanate (AUGMENTIN) 875-125 MG per tablet Take 1 tablet by mouth 2 times daily for 10 days, Disp-20 tablet, R-0Print           Controlled Substances Monitoring:     RX Monitoring 4/15/2021   Attestation -   Periodic Controlled Substance Monitoring No signs of potential drug abuse or diversion identified.        (Please note that portions of this note were completed with a voice recognition program.  Efforts were made to edit the dictations but occasionally words are mis-transcribed.)    Pura Goodell, DO (electronically signed)  Attending Emergency Physician            Pura Goodell, DO  11/04/21 2017

## 2023-05-12 DIAGNOSIS — I10 ESSENTIAL HYPERTENSION: ICD-10-CM

## 2023-05-15 RX ORDER — POTASSIUM CHLORIDE 750 MG/1
TABLET, EXTENDED RELEASE ORAL
Qty: 90 TABLET | Refills: 3 | OUTPATIENT
Start: 2023-05-15

## 2023-05-24 ENCOUNTER — TELEPHONE (OUTPATIENT)
Dept: FAMILY MEDICINE CLINIC | Age: 43
End: 2023-05-24

## 2023-05-24 NOTE — TELEPHONE ENCOUNTER
Patient is not answering [employer] messages, please contact this patient or his emergency contact and let him know I can refer him to a family doctor in [place] [address] who is very good, Dr. Glenn Dobson  He needs to be seen in person  I can place a referral today if he agrees          Ravinder Kimbrough [id number] - [place], [place] - [address]   [place], [facility]   Phone:  [phone]  Fax:  [phone]

## 2023-06-13 DIAGNOSIS — I10 ESSENTIAL HYPERTENSION: ICD-10-CM

## 2023-06-13 RX ORDER — LISINOPRIL 40 MG/1
TABLET ORAL
Qty: 90 TABLET | Refills: 3 | OUTPATIENT
Start: 2023-06-13

## 2023-06-13 NOTE — TELEPHONE ENCOUNTER
Please tell patient, I can refer him to a new PCP in [place], he needs to be seen  Or he can come to us but he needs to make appointment  I cannot refill without any of the above, he will also need labs

## 2023-07-24 DIAGNOSIS — I10 ESSENTIAL HYPERTENSION: ICD-10-CM

## 2023-07-24 DIAGNOSIS — E78.5 HYPERLIPIDEMIA WITH TARGET LDL LESS THAN 100: ICD-10-CM

## 2023-07-24 DIAGNOSIS — J20.9 ACUTE BRONCHITIS DUE TO INFECTION: ICD-10-CM

## 2023-07-24 DIAGNOSIS — E11.65 TYPE 2 DIABETES MELLITUS WITH HYPERGLYCEMIA, WITHOUT LONG-TERM CURRENT USE OF INSULIN (HCC): ICD-10-CM

## 2023-07-24 DIAGNOSIS — F33.1 MODERATE EPISODE OF RECURRENT MAJOR DEPRESSIVE DISORDER (HCC): ICD-10-CM

## 2023-07-24 DIAGNOSIS — K21.9 GASTROESOPHAGEAL REFLUX DISEASE WITHOUT ESOPHAGITIS: ICD-10-CM

## 2023-07-24 DIAGNOSIS — K58.0 IRRITABLE BOWEL SYNDROME WITH DIARRHEA: ICD-10-CM

## 2023-07-24 RX ORDER — GLUCOSAMINE HCL/CHONDROITIN SU 500-400 MG
CAPSULE ORAL
Qty: 100 STRIP | Refills: 5 | OUTPATIENT
Start: 2023-07-24

## 2023-07-24 RX ORDER — FAMOTIDINE 20 MG/1
TABLET, FILM COATED ORAL
Qty: 60 TABLET | Refills: 3 | OUTPATIENT
Start: 2023-07-24

## 2023-07-24 RX ORDER — FUROSEMIDE 20 MG/1
TABLET ORAL
Qty: 60 TABLET | Refills: 0 | OUTPATIENT
Start: 2023-07-24

## 2023-07-24 RX ORDER — ATENOLOL 100 MG/1
100 TABLET ORAL DAILY
Qty: 90 TABLET | Refills: 3 | OUTPATIENT
Start: 2023-07-24

## 2023-07-24 RX ORDER — LOPERAMIDE HYDROCHLORIDE 2 MG/1
CAPSULE ORAL
Qty: 30 CAPSULE | Refills: 2 | OUTPATIENT
Start: 2023-07-24

## 2023-07-24 RX ORDER — RISPERIDONE 0.5 MG/1
0.5 TABLET ORAL 2 TIMES DAILY
Qty: 60 TABLET | Refills: 0 | OUTPATIENT
Start: 2023-07-24

## 2023-07-24 RX ORDER — LISINOPRIL 40 MG/1
40 TABLET ORAL DAILY
Qty: 90 TABLET | Refills: 3 | OUTPATIENT
Start: 2023-07-24

## 2023-07-24 RX ORDER — PEN NEEDLE, DIABETIC 31 GX5/16"
NEEDLE, DISPOSABLE MISCELLANEOUS
Qty: 100 EACH | Refills: 5 | OUTPATIENT
Start: 2023-07-24

## 2023-07-24 RX ORDER — POTASSIUM CHLORIDE 750 MG/1
10 TABLET, EXTENDED RELEASE ORAL DAILY
Qty: 90 TABLET | Refills: 3 | OUTPATIENT
Start: 2023-07-24

## 2023-07-24 RX ORDER — SILDENAFIL CITRATE 20 MG/1
20 TABLET ORAL DAILY PRN
Qty: 30 TABLET | Refills: 0 | OUTPATIENT
Start: 2023-07-24

## 2023-07-24 RX ORDER — ALBUTEROL SULFATE 90 UG/1
AEROSOL, METERED RESPIRATORY (INHALATION)
Qty: 18 G | Refills: 2 | OUTPATIENT
Start: 2023-07-24

## 2023-07-24 RX ORDER — ATORVASTATIN CALCIUM 10 MG/1
10 TABLET, FILM COATED ORAL NIGHTLY
Qty: 90 TABLET | Refills: 0 | OUTPATIENT
Start: 2023-07-24

## 2023-07-24 RX ORDER — PRAZOSIN HYDROCHLORIDE 1 MG/1
1 CAPSULE ORAL NIGHTLY
Qty: 90 CAPSULE | Refills: 3 | OUTPATIENT
Start: 2023-07-24

## 2023-07-24 RX ORDER — SERTRALINE HYDROCHLORIDE 100 MG/1
TABLET, FILM COATED ORAL
Qty: 45 TABLET | Refills: 3 | OUTPATIENT
Start: 2023-07-24

## 2023-07-24 RX ORDER — BUPROPION HYDROCHLORIDE 150 MG/1
150 TABLET, EXTENDED RELEASE ORAL 2 TIMES DAILY
Qty: 60 TABLET | Refills: 3 | OUTPATIENT
Start: 2023-07-24

## 2023-07-24 RX ORDER — BENZONATATE 100 MG/1
CAPSULE ORAL
Qty: 21 CAPSULE | Refills: 0 | OUTPATIENT
Start: 2023-07-24

## 2023-08-14 RX ORDER — SERTRALINE HYDROCHLORIDE 100 MG/1
TABLET, FILM COATED ORAL
Qty: 45 TABLET | Refills: 0 | Status: SHIPPED | OUTPATIENT
Start: 2023-08-14

## 2023-08-14 RX ORDER — FAMOTIDINE 20 MG/1
20 TABLET, FILM COATED ORAL 2 TIMES DAILY
Qty: 60 TABLET | Refills: 0 | Status: SHIPPED | OUTPATIENT
Start: 2023-08-14

## 2023-08-14 RX ORDER — ALBUTEROL SULFATE 90 UG/1
AEROSOL, METERED RESPIRATORY (INHALATION)
Qty: 18 G | Refills: 0 | OUTPATIENT
Start: 2023-08-14

## 2023-08-14 RX ORDER — FUROSEMIDE 20 MG/1
20 TABLET ORAL DAILY
Qty: 30 TABLET | Refills: 0 | Status: SHIPPED | OUTPATIENT
Start: 2023-08-14

## 2023-08-14 RX ORDER — GLUCOSAMINE HCL/CHONDROITIN SU 500-400 MG
CAPSULE ORAL
Qty: 100 STRIP | Refills: 0 | Status: SHIPPED | OUTPATIENT
Start: 2023-08-14

## 2023-08-14 RX ORDER — ATENOLOL 100 MG/1
100 TABLET ORAL DAILY
Qty: 90 TABLET | Refills: 0 | Status: SHIPPED | OUTPATIENT
Start: 2023-08-14

## 2023-08-14 RX ORDER — POTASSIUM CHLORIDE 750 MG/1
10 TABLET, EXTENDED RELEASE ORAL DAILY
Qty: 30 TABLET | Refills: 0 | Status: SHIPPED | OUTPATIENT
Start: 2023-08-14

## 2023-08-14 RX ORDER — PEN NEEDLE, DIABETIC 31 GX5/16"
NEEDLE, DISPOSABLE MISCELLANEOUS
Qty: 100 EACH | Refills: 0 | Status: SHIPPED | OUTPATIENT
Start: 2023-08-14

## 2023-08-14 RX ORDER — ATORVASTATIN CALCIUM 10 MG/1
10 TABLET, FILM COATED ORAL NIGHTLY
Qty: 90 TABLET | Refills: 0 | Status: SHIPPED | OUTPATIENT
Start: 2023-08-14

## 2023-08-14 RX ORDER — PRAZOSIN HYDROCHLORIDE 1 MG/1
1 CAPSULE ORAL NIGHTLY
Qty: 30 CAPSULE | Refills: 0 | Status: SHIPPED | OUTPATIENT
Start: 2023-08-14

## 2023-08-14 RX ORDER — BUPROPION HYDROCHLORIDE 150 MG/1
150 TABLET, EXTENDED RELEASE ORAL 2 TIMES DAILY
Qty: 60 TABLET | Refills: 0 | Status: SHIPPED | OUTPATIENT
Start: 2023-08-14

## 2023-08-14 RX ORDER — LISINOPRIL 40 MG/1
40 TABLET ORAL DAILY
Qty: 30 TABLET | Refills: 0 | Status: SHIPPED | OUTPATIENT
Start: 2023-08-14

## 2023-08-14 RX ORDER — RISPERIDONE 0.5 MG/1
0.5 TABLET ORAL 2 TIMES DAILY
Qty: 60 TABLET | Refills: 0 | Status: SHIPPED | OUTPATIENT
Start: 2023-08-14

## 2023-08-14 RX ORDER — LANCETS 30 GAUGE
1 EACH MISCELLANEOUS DAILY
Qty: 100 EACH | Refills: 0 | Status: SHIPPED | OUTPATIENT
Start: 2023-08-14

## 2023-08-16 ENCOUNTER — OFFICE VISIT (OUTPATIENT)
Dept: FAMILY MEDICINE CLINIC | Age: 43
End: 2023-08-16
Payer: COMMERCIAL

## 2023-08-16 VITALS
DIASTOLIC BLOOD PRESSURE: 86 MMHG | TEMPERATURE: 98.2 F | BODY MASS INDEX: 36.45 KG/M2 | OXYGEN SATURATION: 95 % | HEIGHT: 78 IN | HEART RATE: 59 BPM | WEIGHT: 315 LBS | SYSTOLIC BLOOD PRESSURE: 138 MMHG

## 2023-08-16 DIAGNOSIS — R53.82 CHRONIC FATIGUE: ICD-10-CM

## 2023-08-16 DIAGNOSIS — I10 ESSENTIAL HYPERTENSION: ICD-10-CM

## 2023-08-16 DIAGNOSIS — Z00.00 ENCOUNTER FOR WELL ADULT EXAM WITHOUT ABNORMAL FINDINGS: Primary | ICD-10-CM

## 2023-08-16 DIAGNOSIS — E78.5 HYPERLIPIDEMIA WITH TARGET LDL LESS THAN 100: ICD-10-CM

## 2023-08-16 DIAGNOSIS — Z11.59 NEED FOR HEPATITIS B SCREENING TEST: ICD-10-CM

## 2023-08-16 DIAGNOSIS — R00.2 PALPITATIONS: ICD-10-CM

## 2023-08-16 DIAGNOSIS — E11.65 TYPE 2 DIABETES MELLITUS WITH HYPERGLYCEMIA, WITHOUT LONG-TERM CURRENT USE OF INSULIN (HCC): ICD-10-CM

## 2023-08-16 DIAGNOSIS — F33.9 MAJOR DEPRESSIVE DISORDER, RECURRENT EPISODE WITH ANXIOUS DISTRESS (HCC): ICD-10-CM

## 2023-08-16 DIAGNOSIS — R23.8 OTHER SKIN CHANGES: ICD-10-CM

## 2023-08-16 LAB — HBA1C MFR BLD: 5.6 %

## 2023-08-16 PROCEDURE — 99396 PREV VISIT EST AGE 40-64: CPT | Performed by: NURSE PRACTITIONER

## 2023-08-16 PROCEDURE — 3075F SYST BP GE 130 - 139MM HG: CPT | Performed by: NURSE PRACTITIONER

## 2023-08-16 PROCEDURE — 3079F DIAST BP 80-89 MM HG: CPT | Performed by: NURSE PRACTITIONER

## 2023-08-16 PROCEDURE — 83037 HB GLYCOSYLATED A1C HOME DEV: CPT | Performed by: NURSE PRACTITIONER

## 2023-08-16 SDOH — ECONOMIC STABILITY: FOOD INSECURITY: WITHIN THE PAST 12 MONTHS, THE FOOD YOU BOUGHT JUST DIDN'T LAST AND YOU DIDN'T HAVE MONEY TO GET MORE.: OFTEN TRUE

## 2023-08-16 SDOH — ECONOMIC STABILITY: HOUSING INSECURITY
IN THE LAST 12 MONTHS, WAS THERE A TIME WHEN YOU DID NOT HAVE A STEADY PLACE TO SLEEP OR SLEPT IN A SHELTER (INCLUDING NOW)?: YES

## 2023-08-16 SDOH — ECONOMIC STABILITY: INCOME INSECURITY: HOW HARD IS IT FOR YOU TO PAY FOR THE VERY BASICS LIKE FOOD, HOUSING, MEDICAL CARE, AND HEATING?: VERY HARD

## 2023-08-16 SDOH — ECONOMIC STABILITY: FOOD INSECURITY: WITHIN THE PAST 12 MONTHS, YOU WORRIED THAT YOUR FOOD WOULD RUN OUT BEFORE YOU GOT MONEY TO BUY MORE.: OFTEN TRUE

## 2023-08-16 ASSESSMENT — ENCOUNTER SYMPTOMS
VOMITING: 0
ABDOMINAL DISTENTION: 0
SHORTNESS OF BREATH: 0
WHEEZING: 0
CHEST TIGHTNESS: 0
ABDOMINAL PAIN: 0
COUGH: 0
DIARRHEA: 0
BACK PAIN: 0
CONSTIPATION: 0
NAUSEA: 0

## 2023-08-16 ASSESSMENT — PATIENT HEALTH QUESTIONNAIRE - PHQ9
SUM OF ALL RESPONSES TO PHQ QUESTIONS 1-9: 23
7. TROUBLE CONCENTRATING ON THINGS, SUCH AS READING THE NEWSPAPER OR WATCHING TELEVISION: 3
8. MOVING OR SPEAKING SO SLOWLY THAT OTHER PEOPLE COULD HAVE NOTICED. OR THE OPPOSITE, BEING SO FIGETY OR RESTLESS THAT YOU HAVE BEEN MOVING AROUND A LOT MORE THAN USUAL: 3
SUM OF ALL RESPONSES TO PHQ9 QUESTIONS 1 & 2: 4
6. FEELING BAD ABOUT YOURSELF - OR THAT YOU ARE A FAILURE OR HAVE LET YOURSELF OR YOUR FAMILY DOWN: 3
5. POOR APPETITE OR OVEREATING: 3
2. FEELING DOWN, DEPRESSED OR HOPELESS: 2
9. THOUGHTS THAT YOU WOULD BE BETTER OFF DEAD, OR OF HURTING YOURSELF: 1
3. TROUBLE FALLING OR STAYING ASLEEP: 3
10. IF YOU CHECKED OFF ANY PROBLEMS, HOW DIFFICULT HAVE THESE PROBLEMS MADE IT FOR YOU TO DO YOUR WORK, TAKE CARE OF THINGS AT HOME, OR GET ALONG WITH OTHER PEOPLE: 3
SUM OF ALL RESPONSES TO PHQ QUESTIONS 1-9: 22
1. LITTLE INTEREST OR PLEASURE IN DOING THINGS: 2
SUM OF ALL RESPONSES TO PHQ QUESTIONS 1-9: 23
SUM OF ALL RESPONSES TO PHQ QUESTIONS 1-9: 23
4. FEELING TIRED OR HAVING LITTLE ENERGY: 3

## 2023-08-16 ASSESSMENT — VISUAL ACUITY
OS_CC: 20/20
OD_CC: 20/10

## 2023-08-16 ASSESSMENT — COLUMBIA-SUICIDE SEVERITY RATING SCALE - C-SSRS
6. HAVE YOU EVER DONE ANYTHING, STARTED TO DO ANYTHING, OR PREPARED TO DO ANYTHING TO END YOUR LIFE?: NO
1. WITHIN THE PAST MONTH, HAVE YOU WISHED YOU WERE DEAD OR WISHED YOU COULD GO TO SLEEP AND NOT WAKE UP?: YES
2. HAVE YOU ACTUALLY HAD ANY THOUGHTS OF KILLING YOURSELF?: NO

## 2023-08-16 NOTE — PROGRESS NOTES
Visit Information    Have you changed or started any medications since your last visit including any over-the-counter medicines, vitamins, or herbal medicines? no   Have you stopped taking any of your medications? Is so, why? -  no  Are you having any side effects from any of your medications? - no    Have you seen any other physician or provider since your last visit?  no   Have you had any other diagnostic tests since your last visit?  no   Have you been seen in the emergency room and/or had an admission in a hospital since we last saw you?  no   Have you had your routine dental cleaning in the past 6 months?  no     Do you have an active MyChart account? If no, what is the barrier?   Yes    Patient Care Team:  Bg العلي MD as PCP - General (Family Medicine)  Bg العلي MD as PCP - Empaneled Provider  Tesfaye Nogueira, PhD (Psychology)  STEPHON Reyna - NP (Psychiatry)    Medical History Review  Past Medical, Family, and Social History reviewed and does contribute to the patient presenting condition    Health Maintenance   Topic Date Due    COVID-19 Vaccine (1) Never done    Diabetic foot exam  Never done    Diabetic retinal exam  Never done    Hepatitis B vaccine (1 of 3 - Risk 3-dose series) Never done    Diabetic Alb to Cr ratio (uACR) test  06/16/2022    Lipids  06/16/2022    A1C test (Diabetic or Prediabetic)  03/11/2023    Depression Monitoring  06/02/2023    Flu vaccine (1) 08/01/2023    GFR test (Diabetes, CKD 3-4, OR last GFR 15-59)  10/29/2023    DTaP/Tdap/Td vaccine (2 - Td or Tdap) 12/28/2026    Pneumococcal 0-64 years Vaccine  Completed    Hepatitis C screen  Completed    HIV screen  Completed    Hepatitis A vaccine  Aged Out    Hib vaccine  Aged Out    Meningococcal (ACWY) vaccine  Aged Out    Varicella vaccine  Discontinued
Judgment normal.     Diabetic foot exam:   Left Foot:   Visual Exam: normal   Pulse DP: 2+ (normal)   Filament test: normal sensation  Right Foot:   Visual Exam: normal   Pulse DP: 2+ (normal)   Filament test: normal sensation     Assessment   Plan   1. Encounter for well adult exam without abnormal findings  2. Type 2 diabetes mellitus with hyperglycemia, without long-term current use of insulin (HCC)  -Improving  -A1c improved today  -Continue current regimen  -     POCT glycosylated hemoglobin (Hb A1C)  -      DIABETES FOOT EXAM  3. Need for hepatitis B screening test  -     Hepatitis B Surface Antibody; Future  4. Palpitations  -Unchanged  -Plan for cardiology follow-up, EKG, lab work  -     EKG 12 lead; Future  -     AFL (Epic) - Justin Cervantes MD, Cardiology, [place]  5. Other skin changes  -Spot on the right side of his face changing  -Has had the mole for quite some time, plan is to follow-up with dermatology  -     Ingris Lim MD, Dermatology, [facility]  6. Essential hypertension  -Controlled  -Patient has been out of most of his medication for the last month, blood pressure initially today was elevated at 142/90, repeat is 138/86  7. Hyperlipidemia with target LDL less than 100  -Unsure if improving or worsening  -Lab work ordered  -Continue current regimen  -Discussed diet changes and weight loss  -     Lipid Panel; Future  8. Chronic fatigue  -Stable  -History of sleep apnea  -Looks like titration sleep study was ordered, but not done  -With his history of PTSD, he is unsure if he be able to do this as he states it takes a lot for him to leave the house  -     CBC with Auto Differential; Future  -     Comprehensive Metabolic Panel; Future  -     Magnesium; Future  -     TSH; Future  -     Vitamin B12 & Folate; Future  -     Vitamin D 25 Hydroxy; Future  9.  Major depressive disorder, recurrent episode with anxious distress ([address])   -Controlled  -Continue current medication  -Go to appointment with

## 2023-09-11 DIAGNOSIS — I10 ESSENTIAL HYPERTENSION: ICD-10-CM

## 2023-09-11 DIAGNOSIS — F33.1 MODERATE EPISODE OF RECURRENT MAJOR DEPRESSIVE DISORDER (HCC): ICD-10-CM

## 2023-09-11 RX ORDER — POTASSIUM CHLORIDE 750 MG/1
TABLET, EXTENDED RELEASE ORAL
Qty: 30 TABLET | Refills: 5 | Status: SHIPPED | OUTPATIENT
Start: 2023-09-11

## 2023-09-11 RX ORDER — PRAZOSIN HYDROCHLORIDE 1 MG/1
1 CAPSULE ORAL
Qty: 30 CAPSULE | Refills: 5 | Status: SHIPPED | OUTPATIENT
Start: 2023-09-11

## 2023-09-11 RX ORDER — LISINOPRIL 40 MG/1
40 TABLET ORAL DAILY
Qty: 30 TABLET | Refills: 5 | Status: SHIPPED | OUTPATIENT
Start: 2023-09-11

## 2023-09-11 RX ORDER — RISPERIDONE 0.5 MG/1
0.5 TABLET ORAL 2 TIMES DAILY
Qty: 60 TABLET | Refills: 5 | Status: SHIPPED | OUTPATIENT
Start: 2023-09-11

## 2023-09-11 RX ORDER — FUROSEMIDE 20 MG/1
20 TABLET ORAL DAILY
Qty: 30 TABLET | Refills: 5 | Status: SHIPPED | OUTPATIENT
Start: 2023-09-11

## 2023-09-11 RX ORDER — BUPROPION HYDROCHLORIDE 150 MG/1
150 TABLET, EXTENDED RELEASE ORAL 2 TIMES DAILY
Qty: 60 TABLET | Refills: 5 | Status: SHIPPED | OUTPATIENT
Start: 2023-09-11

## 2023-09-11 NOTE — TELEPHONE ENCOUNTER
Please Approve or Refuse.   Send to Pharmacy per Pt's Request: latoya      Next Visit Date:  11/21/2023   Last Visit Date: 8/16/2023    Hemoglobin A1C (%)   Date Value   08/16/2023 5.6   03/11/2022 6.6 (H)   06/16/2021 5.8             ( goal A1C is < 7)   BP Readings from Last 3 Encounters:   08/16/23 138/86   10/29/22 113/70   11/04/21 126/76          (goal 120/80)  BUN   Date Value Ref Range Status   10/29/2022 21 (H) 6 - 20 mg/dL Final     Creatinine   Date Value Ref Range Status   10/29/2022 1.10 0.70 - 1.20 mg/dL Final     Potassium   Date Value Ref Range Status   10/29/2022 4.8 3.7 - 5.3 mmol/L Final

## 2023-09-12 DIAGNOSIS — F33.1 MODERATE EPISODE OF RECURRENT MAJOR DEPRESSIVE DISORDER (HCC): ICD-10-CM

## 2023-09-12 RX ORDER — SERTRALINE HYDROCHLORIDE 100 MG/1
TABLET, FILM COATED ORAL
Qty: 45 TABLET | Refills: 2 | Status: SHIPPED | OUTPATIENT
Start: 2023-09-12

## 2023-09-12 NOTE — TELEPHONE ENCOUNTER
Please Approve or Refuse.   Send to Pharmacy per Pt's Request:      Next Visit Date:  11/21/2023   Last Visit Date: 8/16/2023    Hemoglobin A1C (%)   Date Value   08/16/2023 5.6   03/11/2022 6.6 (H)   06/16/2021 5.8             ( goal A1C is < 7)   BP Readings from Last 3 Encounters:   08/16/23 138/86   10/29/22 113/70   11/04/21 126/76          (goal 120/80)  BUN   Date Value Ref Range Status   10/29/2022 21 (H) 6 - 20 mg/dL Final     Creatinine   Date Value Ref Range Status   10/29/2022 1.10 0.70 - 1.20 mg/dL Final     Potassium   Date Value Ref Range Status   10/29/2022 4.8 3.7 - 5.3 mmol/L Final

## 2023-09-15 DIAGNOSIS — F33.1 MODERATE EPISODE OF RECURRENT MAJOR DEPRESSIVE DISORDER (HCC): ICD-10-CM

## 2023-09-18 RX ORDER — SERTRALINE HYDROCHLORIDE 100 MG/1
TABLET, FILM COATED ORAL
Qty: 45 TABLET | Refills: 2 | Status: SHIPPED | OUTPATIENT
Start: 2023-09-18

## 2023-11-11 DIAGNOSIS — I10 ESSENTIAL HYPERTENSION: ICD-10-CM

## 2023-11-11 DIAGNOSIS — E78.5 HYPERLIPIDEMIA WITH TARGET LDL LESS THAN 100: ICD-10-CM

## 2023-11-13 ENCOUNTER — APPOINTMENT (OUTPATIENT)
Dept: ULTRASOUND IMAGING | Age: 43
End: 2023-11-13
Payer: COMMERCIAL

## 2023-11-13 ENCOUNTER — HOSPITAL ENCOUNTER (INPATIENT)
Age: 43
LOS: 3 days | Discharge: HOME | DRG: 263 | End: 2023-11-16
Attending: SURGERY | Admitting: SURGERY
Payer: COMMERCIAL

## 2023-11-13 ENCOUNTER — APPOINTMENT (OUTPATIENT)
Dept: GENERAL RADIOLOGY | Age: 43
DRG: 263 | End: 2023-11-13
Attending: SURGERY
Payer: COMMERCIAL

## 2023-11-13 ENCOUNTER — HOSPITAL ENCOUNTER (EMERGENCY)
Age: 43
Discharge: ANOTHER HOSPITAL | End: 2023-11-13
Attending: EMERGENCY MEDICINE
Payer: COMMERCIAL

## 2023-11-13 VITALS
DIASTOLIC BLOOD PRESSURE: 79 MMHG | WEIGHT: 315 LBS | HEIGHT: 78 IN | SYSTOLIC BLOOD PRESSURE: 125 MMHG | HEART RATE: 60 BPM | RESPIRATION RATE: 21 BRPM | OXYGEN SATURATION: 92 % | BODY MASS INDEX: 36.45 KG/M2 | TEMPERATURE: 97.9 F

## 2023-11-13 DIAGNOSIS — E86.0 MILD DEHYDRATION: ICD-10-CM

## 2023-11-13 DIAGNOSIS — R11.2 NAUSEA VOMITING AND DIARRHEA: Primary | ICD-10-CM

## 2023-11-13 DIAGNOSIS — Z90.49 S/P LAPAROSCOPIC CHOLECYSTECTOMY: Primary | ICD-10-CM

## 2023-11-13 DIAGNOSIS — E11.9 TYPE 2 DIABETES MELLITUS WITHOUT COMPLICATION, WITHOUT LONG-TERM CURRENT USE OF INSULIN (HCC): ICD-10-CM

## 2023-11-13 DIAGNOSIS — K80.42 CALCULUS OF BILE DUCT WITH ACUTE CHOLECYSTITIS WITHOUT OBSTRUCTION: ICD-10-CM

## 2023-11-13 DIAGNOSIS — R19.7 NAUSEA VOMITING AND DIARRHEA: Primary | ICD-10-CM

## 2023-11-13 PROBLEM — K80.43 CALCULUS OF BILE DUCT WITH ACUTE CHOLECYSTITIS AND OBSTRUCTION: Status: ACTIVE | Noted: 2023-11-13

## 2023-11-13 PROBLEM — K80.10: Status: ACTIVE | Noted: 2023-11-13

## 2023-11-13 PROBLEM — F41.8 DEPRESSION WITH ANXIETY: Status: ACTIVE | Noted: 2023-11-13

## 2023-11-13 PROBLEM — K80.00 ACUTE CALCULOUS CHOLECYSTITIS: Status: ACTIVE | Noted: 2023-11-13

## 2023-11-13 LAB
ALBUMIN SERPL-MCNC: 4.4 G/DL (ref 3.5–5.2)
ALBUMIN/GLOB SERPL: 1.3 {RATIO} (ref 1–2.5)
ALP SERPL-CCNC: 63 U/L (ref 40–129)
ALT SERPL-CCNC: 11 U/L (ref 5–41)
ANION GAP SERPL CALCULATED.3IONS-SCNC: 9 MMOL/L (ref 9–17)
AST SERPL-CCNC: 16 U/L
BACTERIA URNS QL MICRO: ABNORMAL
BASOPHILS # BLD: 0.08 K/UL (ref 0–0.2)
BASOPHILS NFR BLD: 1 % (ref 0–2)
BILIRUB SERPL-MCNC: 0.4 MG/DL (ref 0.3–1.2)
BILIRUB UR QL STRIP: NEGATIVE
BUN SERPL-MCNC: 18 MG/DL (ref 6–20)
BUN/CREAT SERPL: 18 (ref 9–20)
CALCIUM SERPL-MCNC: 9.6 MG/DL (ref 8.6–10.4)
CHLORIDE SERPL-SCNC: 98 MMOL/L (ref 98–107)
CLARITY UR: CLEAR
CO2 SERPL-SCNC: 28 MMOL/L (ref 20–31)
COLOR UR: YELLOW
CREAT SERPL-MCNC: 1 MG/DL (ref 0.7–1.2)
EKG ATRIAL RATE: 69 BPM
EKG P AXIS: 41 DEGREES
EKG P-R INTERVAL: 178 MS
EKG Q-T INTERVAL: 396 MS
EKG QRS DURATION: 96 MS
EKG QTC CALCULATION (BAZETT): 424 MS
EKG R AXIS: -14 DEGREES
EKG T AXIS: 31 DEGREES
EKG VENTRICULAR RATE: 69 BPM
EOSINOPHIL # BLD: 0.16 K/UL (ref 0–0.44)
EOSINOPHILS RELATIVE PERCENT: 1 % (ref 1–4)
EPI CELLS #/AREA URNS HPF: ABNORMAL /HPF (ref 0–5)
ERYTHROCYTE [DISTWIDTH] IN BLOOD BY AUTOMATED COUNT: 15 % (ref 11.8–14.4)
GFR SERPL CREATININE-BSD FRML MDRD: >60 ML/MIN/1.73M2
GLUCOSE BLD STRIP.AUTO-MCNC: 111 MG/DL (ref 70–108)
GLUCOSE BLD STRIP.AUTO-MCNC: 92 MG/DL (ref 70–108)
GLUCOSE SERPL-MCNC: 155 MG/DL (ref 70–99)
GLUCOSE UR STRIP-MCNC: NEGATIVE MG/DL
HCT VFR BLD AUTO: 46.5 % (ref 40.7–50.3)
HGB BLD-MCNC: 15 G/DL (ref 13–17)
HGB UR QL STRIP.AUTO: NEGATIVE
IMM GRANULOCYTES # BLD AUTO: 0.05 K/UL (ref 0–0.3)
IMM GRANULOCYTES NFR BLD: 0 %
KETONES UR STRIP-MCNC: NEGATIVE MG/DL
LACTATE BLDV-SCNC: 1.5 MMOL/L (ref 0.5–2.2)
LEUKOCYTE ESTERASE UR QL STRIP: NEGATIVE
LIPASE SERPL-CCNC: 58 U/L (ref 13–60)
LYMPHOCYTES NFR BLD: 2.74 K/UL (ref 1.1–3.7)
LYMPHOCYTES RELATIVE PERCENT: 19 % (ref 24–43)
MAGNESIUM SERPL-MCNC: 1.8 MG/DL (ref 1.6–2.6)
MCH RBC QN AUTO: 27.9 PG (ref 25.2–33.5)
MCHC RBC AUTO-ENTMCNC: 32.3 G/DL (ref 28.4–34.8)
MCV RBC AUTO: 86.4 FL (ref 82.6–102.9)
MONOCYTES NFR BLD: 1.19 K/UL (ref 0.1–1.2)
MONOCYTES NFR BLD: 8 % (ref 3–12)
MUCOUS THREADS URNS QL MICRO: ABNORMAL
NEUTROPHILS NFR BLD: 71 % (ref 36–65)
NEUTS SEG NFR BLD: 10.05 K/UL (ref 1.5–8.1)
NITRITE UR QL STRIP: NEGATIVE
NRBC BLD-RTO: 0 PER 100 WBC
PH UR STRIP: 6 [PH] (ref 5–9)
PLATELET # BLD AUTO: 213 K/UL (ref 138–453)
PMV BLD AUTO: 11 FL (ref 8.1–13.5)
POTASSIUM SERPL-SCNC: 4.5 MMOL/L (ref 3.7–5.3)
PROT SERPL-MCNC: 7.8 G/DL (ref 6.4–8.3)
PROT UR STRIP-MCNC: NEGATIVE MG/DL
RBC # BLD AUTO: 5.38 M/UL (ref 4.21–5.77)
RBC #/AREA URNS HPF: ABNORMAL /HPF (ref 0–2)
SODIUM SERPL-SCNC: 135 MMOL/L (ref 135–144)
SP GR UR STRIP: 1.01 (ref 1.01–1.02)
UROBILINOGEN UR STRIP-ACNC: NORMAL EU/DL (ref 0–1)
WBC #/AREA URNS HPF: ABNORMAL /HPF (ref 0–5)
WBC OTHER # BLD: 14.3 K/UL (ref 3.5–11.3)

## 2023-11-13 PROCEDURE — 36415 COLL VENOUS BLD VENIPUNCTURE: CPT

## 2023-11-13 PROCEDURE — 6370000000 HC RX 637 (ALT 250 FOR IP)

## 2023-11-13 PROCEDURE — 2580000003 HC RX 258: Performed by: EMERGENCY MEDICINE

## 2023-11-13 PROCEDURE — 99285 EMERGENCY DEPT VISIT HI MDM: CPT

## 2023-11-13 PROCEDURE — 81001 URINALYSIS AUTO W/SCOPE: CPT

## 2023-11-13 PROCEDURE — 96365 THER/PROPH/DIAG IV INF INIT: CPT

## 2023-11-13 PROCEDURE — 83605 ASSAY OF LACTIC ACID: CPT

## 2023-11-13 PROCEDURE — 96361 HYDRATE IV INFUSION ADD-ON: CPT

## 2023-11-13 PROCEDURE — 2500000003 HC RX 250 WO HCPCS: Performed by: EMERGENCY MEDICINE

## 2023-11-13 PROCEDURE — 1200000000 HC SEMI PRIVATE

## 2023-11-13 PROCEDURE — 2580000003 HC RX 258: Performed by: NURSE PRACTITIONER

## 2023-11-13 PROCEDURE — 83690 ASSAY OF LIPASE: CPT

## 2023-11-13 PROCEDURE — 85025 COMPLETE CBC W/AUTO DIFF WBC: CPT

## 2023-11-13 PROCEDURE — 93010 ELECTROCARDIOGRAM REPORT: CPT | Performed by: INTERNAL MEDICINE

## 2023-11-13 PROCEDURE — 93005 ELECTROCARDIOGRAM TRACING: CPT | Performed by: EMERGENCY MEDICINE

## 2023-11-13 PROCEDURE — 6360000002 HC RX W HCPCS: Performed by: EMERGENCY MEDICINE

## 2023-11-13 PROCEDURE — C9113 INJ PANTOPRAZOLE SODIUM, VIA: HCPCS | Performed by: NURSE PRACTITIONER

## 2023-11-13 PROCEDURE — 76705 ECHO EXAM OF ABDOMEN: CPT

## 2023-11-13 PROCEDURE — 71046 X-RAY EXAM CHEST 2 VIEWS: CPT

## 2023-11-13 PROCEDURE — 80053 COMPREHEN METABOLIC PANEL: CPT

## 2023-11-13 PROCEDURE — 96375 TX/PRO/DX INJ NEW DRUG ADDON: CPT

## 2023-11-13 PROCEDURE — 96376 TX/PRO/DX INJ SAME DRUG ADON: CPT

## 2023-11-13 PROCEDURE — 99222 1ST HOSP IP/OBS MODERATE 55: CPT | Performed by: SURGERY

## 2023-11-13 PROCEDURE — 99254 IP/OBS CNSLTJ NEW/EST MOD 60: CPT

## 2023-11-13 PROCEDURE — A4216 STERILE WATER/SALINE, 10 ML: HCPCS | Performed by: NURSE PRACTITIONER

## 2023-11-13 PROCEDURE — 82948 REAGENT STRIP/BLOOD GLUCOSE: CPT

## 2023-11-13 PROCEDURE — 83735 ASSAY OF MAGNESIUM: CPT

## 2023-11-13 PROCEDURE — A4216 STERILE WATER/SALINE, 10 ML: HCPCS | Performed by: EMERGENCY MEDICINE

## 2023-11-13 PROCEDURE — 6360000002 HC RX W HCPCS: Performed by: NURSE PRACTITIONER

## 2023-11-13 RX ORDER — SODIUM CHLORIDE 9 MG/ML
INJECTION, SOLUTION INTRAVENOUS PRN
Status: DISCONTINUED | OUTPATIENT
Start: 2023-11-13 | End: 2023-11-15 | Stop reason: SDUPTHER

## 2023-11-13 RX ORDER — FAMOTIDINE 20 MG/1
20 TABLET, FILM COATED ORAL 2 TIMES DAILY
Status: DISCONTINUED | OUTPATIENT
Start: 2023-11-13 | End: 2023-11-16 | Stop reason: HOSPADM

## 2023-11-13 RX ORDER — ATENOLOL 100 MG/1
100 TABLET ORAL DAILY
Status: DISCONTINUED | OUTPATIENT
Start: 2023-11-13 | End: 2023-11-16 | Stop reason: HOSPADM

## 2023-11-13 RX ORDER — HYDROMORPHONE HYDROCHLORIDE 1 MG/ML
1 INJECTION, SOLUTION INTRAMUSCULAR; INTRAVENOUS; SUBCUTANEOUS ONCE
Status: COMPLETED | OUTPATIENT
Start: 2023-11-13 | End: 2023-11-13

## 2023-11-13 RX ORDER — INSULIN LISPRO 100 [IU]/ML
0-4 INJECTION, SOLUTION INTRAVENOUS; SUBCUTANEOUS NIGHTLY
Status: DISCONTINUED | OUTPATIENT
Start: 2023-11-13 | End: 2023-11-16 | Stop reason: HOSPADM

## 2023-11-13 RX ORDER — MORPHINE SULFATE 4 MG/ML
4 INJECTION, SOLUTION INTRAMUSCULAR; INTRAVENOUS
Status: DISCONTINUED | OUTPATIENT
Start: 2023-11-13 | End: 2023-11-16 | Stop reason: HOSPADM

## 2023-11-13 RX ORDER — HYDROMORPHONE HYDROCHLORIDE 1 MG/ML
1 INJECTION, SOLUTION INTRAMUSCULAR; INTRAVENOUS; SUBCUTANEOUS ONCE
Status: DISCONTINUED | OUTPATIENT
Start: 2023-11-13 | End: 2023-11-13 | Stop reason: HOSPADM

## 2023-11-13 RX ORDER — ONDANSETRON 4 MG/1
4 TABLET, ORALLY DISINTEGRATING ORAL EVERY 8 HOURS PRN
Status: DISCONTINUED | OUTPATIENT
Start: 2023-11-13 | End: 2023-11-14 | Stop reason: SDUPTHER

## 2023-11-13 RX ORDER — DEXTROSE MONOHYDRATE 100 MG/ML
INJECTION, SOLUTION INTRAVENOUS CONTINUOUS PRN
Status: DISCONTINUED | OUTPATIENT
Start: 2023-11-13 | End: 2023-11-16 | Stop reason: HOSPADM

## 2023-11-13 RX ORDER — ATENOLOL 100 MG/1
TABLET ORAL
Qty: 90 TABLET | Refills: 0 | Status: SHIPPED | OUTPATIENT
Start: 2023-11-13

## 2023-11-13 RX ORDER — ATORVASTATIN CALCIUM 10 MG/1
10 TABLET, FILM COATED ORAL NIGHTLY
Status: DISCONTINUED | OUTPATIENT
Start: 2023-11-13 | End: 2023-11-16 | Stop reason: HOSPADM

## 2023-11-13 RX ORDER — LISINOPRIL 40 MG/1
40 TABLET ORAL DAILY
Status: DISCONTINUED | OUTPATIENT
Start: 2023-11-13 | End: 2023-11-16 | Stop reason: HOSPADM

## 2023-11-13 RX ORDER — 0.9 % SODIUM CHLORIDE 0.9 %
1000 INTRAVENOUS SOLUTION INTRAVENOUS ONCE
Status: COMPLETED | OUTPATIENT
Start: 2023-11-13 | End: 2023-11-13

## 2023-11-13 RX ORDER — BUPROPION HYDROCHLORIDE 150 MG/1
150 TABLET, EXTENDED RELEASE ORAL 2 TIMES DAILY
Status: DISCONTINUED | OUTPATIENT
Start: 2023-11-13 | End: 2023-11-16 | Stop reason: HOSPADM

## 2023-11-13 RX ORDER — ATORVASTATIN CALCIUM 10 MG/1
10 TABLET, FILM COATED ORAL
Qty: 90 TABLET | Refills: 0 | Status: SHIPPED | OUTPATIENT
Start: 2023-11-13

## 2023-11-13 RX ORDER — PRAZOSIN HYDROCHLORIDE 1 MG/1
1 CAPSULE ORAL NIGHTLY
Status: DISCONTINUED | OUTPATIENT
Start: 2023-11-13 | End: 2023-11-16 | Stop reason: HOSPADM

## 2023-11-13 RX ORDER — ONDANSETRON 2 MG/ML
4 INJECTION INTRAMUSCULAR; INTRAVENOUS EVERY 6 HOURS PRN
Status: DISCONTINUED | OUTPATIENT
Start: 2023-11-13 | End: 2023-11-14 | Stop reason: SDUPTHER

## 2023-11-13 RX ORDER — IBUPROFEN 600 MG/1
1 TABLET ORAL PRN
Status: DISCONTINUED | OUTPATIENT
Start: 2023-11-13 | End: 2023-11-16 | Stop reason: HOSPADM

## 2023-11-13 RX ORDER — KETOROLAC TROMETHAMINE 30 MG/ML
30 INJECTION, SOLUTION INTRAMUSCULAR; INTRAVENOUS ONCE
Status: COMPLETED | OUTPATIENT
Start: 2023-11-13 | End: 2023-11-13

## 2023-11-13 RX ORDER — MORPHINE SULFATE 2 MG/ML
2 INJECTION, SOLUTION INTRAMUSCULAR; INTRAVENOUS
Status: DISCONTINUED | OUTPATIENT
Start: 2023-11-13 | End: 2023-11-16 | Stop reason: HOSPADM

## 2023-11-13 RX ORDER — FUROSEMIDE 20 MG/1
20 TABLET ORAL DAILY
Status: DISCONTINUED | OUTPATIENT
Start: 2023-11-13 | End: 2023-11-16 | Stop reason: HOSPADM

## 2023-11-13 RX ORDER — SODIUM CHLORIDE 0.9 % (FLUSH) 0.9 %
5-40 SYRINGE (ML) INJECTION EVERY 12 HOURS SCHEDULED
Status: DISCONTINUED | OUTPATIENT
Start: 2023-11-13 | End: 2023-11-15 | Stop reason: SDUPTHER

## 2023-11-13 RX ORDER — RISPERIDONE 0.25 MG/1
0.5 TABLET ORAL 2 TIMES DAILY
Status: DISCONTINUED | OUTPATIENT
Start: 2023-11-13 | End: 2023-11-16 | Stop reason: HOSPADM

## 2023-11-13 RX ORDER — SODIUM CHLORIDE 9 MG/ML
INJECTION, SOLUTION INTRAVENOUS CONTINUOUS
Status: DISCONTINUED | OUTPATIENT
Start: 2023-11-13 | End: 2023-11-14 | Stop reason: SDUPTHER

## 2023-11-13 RX ORDER — SODIUM CHLORIDE 0.9 % (FLUSH) 0.9 %
5-40 SYRINGE (ML) INJECTION PRN
Status: DISCONTINUED | OUTPATIENT
Start: 2023-11-13 | End: 2023-11-15 | Stop reason: SDUPTHER

## 2023-11-13 RX ORDER — ONDANSETRON 2 MG/ML
4 INJECTION INTRAMUSCULAR; INTRAVENOUS ONCE
Status: COMPLETED | OUTPATIENT
Start: 2023-11-13 | End: 2023-11-13

## 2023-11-13 RX ORDER — INSULIN LISPRO 100 [IU]/ML
0-4 INJECTION, SOLUTION INTRAVENOUS; SUBCUTANEOUS
Status: DISCONTINUED | OUTPATIENT
Start: 2023-11-13 | End: 2023-11-16 | Stop reason: HOSPADM

## 2023-11-13 RX ORDER — SODIUM CHLORIDE 9 MG/ML
INJECTION, SOLUTION INTRAVENOUS CONTINUOUS
Status: DISCONTINUED | OUTPATIENT
Start: 2023-11-13 | End: 2023-11-13 | Stop reason: HOSPADM

## 2023-11-13 RX ADMIN — BUPROPION HYDROCHLORIDE 150 MG: 150 TABLET, EXTENDED RELEASE ORAL at 21:16

## 2023-11-13 RX ADMIN — FAMOTIDINE 20 MG: 20 TABLET ORAL at 21:16

## 2023-11-13 RX ADMIN — SODIUM CHLORIDE: 9 INJECTION, SOLUTION INTRAVENOUS at 19:00

## 2023-11-13 RX ADMIN — ATENOLOL 100 MG: 100 TABLET ORAL at 22:58

## 2023-11-13 RX ADMIN — PIPERACILLIN AND TAZOBACTAM 4500 MG: 4; .5 INJECTION, POWDER, LYOPHILIZED, FOR SOLUTION INTRAVENOUS at 17:09

## 2023-11-13 RX ADMIN — HYDROMORPHONE HYDROCHLORIDE 1 MG: 1 INJECTION, SOLUTION INTRAMUSCULAR; INTRAVENOUS; SUBCUTANEOUS at 06:58

## 2023-11-13 RX ADMIN — HYDROMORPHONE HYDROCHLORIDE 1 MG: 1 INJECTION, SOLUTION INTRAMUSCULAR; INTRAVENOUS; SUBCUTANEOUS at 08:43

## 2023-11-13 RX ADMIN — SODIUM CHLORIDE 1000 ML: 9 INJECTION, SOLUTION INTRAVENOUS at 10:15

## 2023-11-13 RX ADMIN — ATORVASTATIN CALCIUM 10 MG: 10 TABLET, FILM COATED ORAL at 21:15

## 2023-11-13 RX ADMIN — SODIUM CHLORIDE 1000 ML: 9 INJECTION, SOLUTION INTRAVENOUS at 07:41

## 2023-11-13 RX ADMIN — KETOROLAC TROMETHAMINE 30 MG: 30 INJECTION, SOLUTION INTRAMUSCULAR at 12:15

## 2023-11-13 RX ADMIN — ONDANSETRON 4 MG: 2 INJECTION INTRAMUSCULAR; INTRAVENOUS at 06:57

## 2023-11-13 RX ADMIN — SODIUM CHLORIDE: 9 INJECTION, SOLUTION INTRAVENOUS at 15:43

## 2023-11-13 RX ADMIN — RISPERIDONE 0.5 MG: 0.25 TABLET, FILM COATED ORAL at 21:16

## 2023-11-13 RX ADMIN — FAMOTIDINE 20 MG: 10 INJECTION, SOLUTION INTRAVENOUS at 07:42

## 2023-11-13 RX ADMIN — PIPERACILLIN AND TAZOBACTAM 3375 MG: 3; .375 INJECTION, POWDER, FOR SOLUTION INTRAVENOUS at 08:43

## 2023-11-13 RX ADMIN — HYDROMORPHONE HYDROCHLORIDE 1 MG: 1 INJECTION, SOLUTION INTRAMUSCULAR; INTRAVENOUS; SUBCUTANEOUS at 12:16

## 2023-11-13 RX ADMIN — PRAZOSIN HYDROCHLORIDE 1 MG: 1 CAPSULE ORAL at 21:16

## 2023-11-13 RX ADMIN — SODIUM CHLORIDE: 9 INJECTION, SOLUTION INTRAVENOUS at 12:53

## 2023-11-13 RX ADMIN — PANTOPRAZOLE SODIUM 40 MG: 40 INJECTION, POWDER, FOR SOLUTION INTRAVENOUS at 15:47

## 2023-11-13 ASSESSMENT — PAIN DESCRIPTION - DESCRIPTORS: DESCRIPTORS: SHARP

## 2023-11-13 ASSESSMENT — PAIN SCALES - GENERAL
PAINLEVEL_OUTOF10: 10
PAINLEVEL_OUTOF10: 9
PAINLEVEL_OUTOF10: 7
PAINLEVEL_OUTOF10: 10

## 2023-11-13 ASSESSMENT — PAIN DESCRIPTION - ORIENTATION
ORIENTATION: RIGHT;MID
ORIENTATION: UPPER

## 2023-11-13 ASSESSMENT — ENCOUNTER SYMPTOMS
BACK PAIN: 0
DIARRHEA: 1
SHORTNESS OF BREATH: 0
COLOR CHANGE: 0
ABDOMINAL PAIN: 1
SORE THROAT: 0
COUGH: 0
NAUSEA: 1
VOMITING: 1

## 2023-11-13 ASSESSMENT — PAIN DESCRIPTION - LOCATION
LOCATION: ABDOMEN

## 2023-11-13 ASSESSMENT — PAIN - FUNCTIONAL ASSESSMENT: PAIN_FUNCTIONAL_ASSESSMENT: 0-10

## 2023-11-13 NOTE — ED NOTES
Bed assigned, waiting for eta      Tammi Labrum  11/13/23 8252 Xolair Pregnancy And Lactation Text: This medication is Pregnancy Category B and is considered safe during pregnancy. This medication is excreted in breast milk.

## 2023-11-13 NOTE — H&P
[address] [facility] Surgery History and Physical  Jorge Sharpe MD    Pt Name: Seamus Glesaon  MRN: [id number]  YOB: 1980  Date of evaluation: 11/13/2023  Primary Care Physician: Carito Perez MD    Reason for evaluation: Calculus cholecystitis  IMPRESSIONS:   Calculus cholecystitis  Fatty liver disease  Morbid obesity (BMI 47)  does not have any pertinent problems on file. PLANS:   Admit type: Inpatient  It is expected this patient's LOS will be: Greater than 2 midnights  Anticipated Disposition Upon Discharge: Home  Analgesics and antiemetics on a prn basis  IV hydration  Empiric antibiotic coverage  DVT prophylaxis with SCD's  Home Medications as ordered  A.m. labs  Risks, benefits and alternatives discussed in detail with patient in regards to cholecystectomy versus conservative management. Robotic, laparoscopic and open techniques were discussed. All questions answered. He is in agreement and wishes to proceed with robotic possible open cholecystectomy. Planning on surgical intervention tomorrow  Medical clearance/risk stratification  SUBJECTIVE:   Chief Complaint: Right upper quadrant abdominal pain    History of Present Illness: Melodie House is a 42-year-old male who presents for further care of his epigastric and right upper quadrant abdominal pain radiating around to the right flank. Found to have calculus cholecystitis at outside facility. Presented to [facility] earlier this morning due to severe pain. He states he has known about gallbladder issues over the past several months. Usually he states that gradually will get better. This time it continued and worsened. Sharp and severe. Found to have cholelithiasis. Positive Clay sign. Associated with nausea. Admits he did have some vomiting after the pain increased. He states last night he had a lot of tacos for dinner. Woke him up later that evening with the pain.   Denies being around any recent sick

## 2023-11-13 NOTE — TELEPHONE ENCOUNTER
Please Approve or Refuse.   Send to Pharmacy per Pt's Request:      Next Visit Date:  11/21/2023   Last Visit Date: 8/16/2023    Hemoglobin A1C (%)   Date Value   08/16/2023 5.6   03/11/2022 6.6 (H)   06/16/2021 5.8             ( goal A1C is < 7)   BP Readings from Last 3 Encounters:   11/13/23 (!) 165/71   08/16/23 138/86   10/29/22 113/70          (goal 120/80)  BUN   Date Value Ref Range Status   11/13/2023 18 6 - 20 mg/dL Final     Creatinine   Date Value Ref Range Status   11/13/2023 1.0 0.7 - 1.2 mg/dL Final     Potassium   Date Value Ref Range Status   11/13/2023 4.5 3.7 - 5.3 mmol/L Final

## 2023-11-13 NOTE — ED NOTES
ADE called back with [facility], connected call to Dr Zack Mariano, [facility]  11/13/23 [phone]

## 2023-11-13 NOTE — ED PROVIDER NOTES
[address] [facility] ED  EMERGENCY DEPARTMENT ENCOUNTER      Pt Name: Cristobal Elizondo  MRN: [id number]  [address] [facility] 1980  Date of evaluation: 11/13/2023  Provider: Faiza Bedoya MD    [address]       Chief Complaint   Patient presents with    Abdominal Pain     Patient complains of right upper abdominal pain that has been on going for a few weeks. Patient states he has been throwing up since last night and feels it might be gall bladder issues. Took tylenol this AM.         HISTORY OF PRESENT ILLNESS   (Location/Symptom, Timing/Onset, Context/Setting, Quality, Duration, Modifying Factors, Severity)  Note limiting factors. Cristobal Elizondo is a 37 y.o. male who presents to the emergency department     Signout received from Dr. Gagan Valdivia. Please refer to his dictation for complete history of present illness review of systems physical exam findings and initial plan of care. At time of signout laboratory studies and gallbladder ultrasound were pending. Laboratory studies reviewed. Patient does have leukocytosis with a left shift. Liver function studies lactic and lipase are all unremarkable. Patient does have gallstones and was reported to have positive sonographic Clay's sign. Discussed results with patient's. Patient has been having difficulties with nausea and vomiting associated with fatty foods for the past several months. Symptoms were significantly intense last night started about 6 PM after eating the tacos. Patient was reexamined. He does have right upper quadrant tenderness on exam.  Abdomen is nondistended. Zosyn was ordered. Did discuss with Dr. Darlene Portillo on-call general surgery. Per discussion with the OR staff we are unable to accommodate the patient due to his height of 6 feet 10 inches. Patient was discussed with Dr. Abilio Carlson at [facility]'s general surgery on-call and was excepted for transfer. Patient is in agreement with plan. FINAL IMPRESSION      1.  Nausea vomiting and

## 2023-11-13 NOTE — ED NOTES
Per She Goyal, unable to get transport until 1730 this evening.  Per Dr Hedy Anthony, need to upgrade to MICU for sooner harriett Saleh  11/13/23 [id number]

## 2023-11-13 NOTE — ED NOTES
Called [facility] for [facility] to [facility], they will call the [address] and call back     Enrique Saleh  11/13/23 1118

## 2023-11-13 NOTE — ED PROVIDER NOTES
[facility] ED  eMERGENCY dEPARTMENT eNCOUnter      Pt Name: Laura Nogueira  MRN: [id number]  [address] [facility] 1980  Date of evaluation: 11/13/2023  Provider: Vilma Zhang       Chief Complaint   Patient presents with    Abdominal Pain     Patient complains of right upper abdominal pain that has been on going for a few weeks. Patient states he has been throwing up since last night and feels it might be gall bladder issues. Took tylenol this AM.         HISTORY OF PRESENT ILLNESS   (Location/Symptom, Timing/Onset, Context/Setting, Quality, Duration, Modifying Factors, Severity) Note limiting factors. HPI    Laura Nogueira is a 37 y.o. male who presents to the emergency department with complaint of abdominal pain with nausea and vomiting and diarrhea. Patient is a 68-year-old male with past medical history of type 2 diabetes and hypertension. He states for the past few months he has been struggling with abdominal pain after eating and has concerns could be gallbladder. He states last night he did have tacos for dinner and then began with increasing abdominal pain followed by bouts of nausea and vomiting. He also states that he has had loose stool diarrhea associated with this. He denies any known sick contacts or recent antibiotic use or travel outside the country. He states there is no discoloration or blood in his emesis or stool. He states that typically his symptoms will last for a short time and then improve but this time they have persisted and secondary to this he comes in for evaluation    Nursing Notes were reviewed. REVIEW OF SYSTEMS    (2+ for level 4; 10+ for level 5)   Review of Systems   Constitutional:  Negative for chills and fever. HENT:  Negative for congestion and sore throat. Eyes:  Negative for visual disturbance. Respiratory:  Negative for cough and shortness of breath. Cardiovascular:  Positive for chest pain.    Gastrointestinal:  Positive for

## 2023-11-14 ENCOUNTER — ANESTHESIA EVENT (OUTPATIENT)
Dept: OPERATING ROOM | Age: 43
DRG: 263 | End: 2023-11-14
Payer: COMMERCIAL

## 2023-11-14 ENCOUNTER — TELEPHONE (OUTPATIENT)
Dept: FAMILY MEDICINE CLINIC | Age: 43
End: 2023-11-14

## 2023-11-14 ENCOUNTER — ANESTHESIA (OUTPATIENT)
Dept: OPERATING ROOM | Age: 43
DRG: 263 | End: 2023-11-14
Payer: COMMERCIAL

## 2023-11-14 LAB
ALBUMIN SERPL BCG-MCNC: 3.6 G/DL (ref 3.5–5.1)
ALP SERPL-CCNC: 50 U/L (ref 38–126)
ALT SERPL W/O P-5'-P-CCNC: 9 U/L (ref 11–66)
ANION GAP SERPL CALC-SCNC: 8 MEQ/L (ref 8–16)
AST SERPL-CCNC: 13 U/L (ref 5–40)
BILIRUB CONJ SERPL-MCNC: < 0.2 MG/DL (ref 0–0.3)
BILIRUB SERPL-MCNC: 0.6 MG/DL (ref 0.3–1.2)
BUN SERPL-MCNC: 11 MG/DL (ref 7–22)
CALCIUM SERPL-MCNC: 8.5 MG/DL (ref 8.5–10.5)
CHLORIDE SERPL-SCNC: 108 MEQ/L (ref 98–111)
CO2 SERPL-SCNC: 27 MEQ/L (ref 23–33)
CREAT SERPL-MCNC: 0.9 MG/DL (ref 0.4–1.2)
DEPRECATED RDW RBC AUTO: 48 FL (ref 35–45)
ERYTHROCYTE [DISTWIDTH] IN BLOOD BY AUTOMATED COUNT: 15.1 % (ref 11.5–14.5)
GFR SERPL CREATININE-BSD FRML MDRD: > 60 ML/MIN/1.73M2
GLUCOSE BLD STRIP.AUTO-MCNC: 106 MG/DL (ref 70–108)
GLUCOSE BLD STRIP.AUTO-MCNC: 113 MG/DL (ref 70–108)
GLUCOSE BLD STRIP.AUTO-MCNC: 144 MG/DL (ref 70–108)
GLUCOSE BLD STRIP.AUTO-MCNC: 159 MG/DL (ref 70–108)
GLUCOSE SERPL-MCNC: 103 MG/DL (ref 70–108)
HCT VFR BLD AUTO: 41.5 % (ref 42–52)
HGB BLD-MCNC: 13.4 GM/DL (ref 14–18)
LIPASE SERPL-CCNC: 14.6 U/L (ref 5.6–51.3)
MCH RBC QN AUTO: 28 PG (ref 26–33)
MCHC RBC AUTO-ENTMCNC: 32.3 GM/DL (ref 32.2–35.5)
MCV RBC AUTO: 86.6 FL (ref 80–94)
PLATELET # BLD AUTO: 173 THOU/MM3 (ref 130–400)
PMV BLD AUTO: 11.1 FL (ref 9.4–12.4)
POTASSIUM SERPL-SCNC: 4.1 MEQ/L (ref 3.5–5.2)
PROT SERPL-MCNC: 6.2 G/DL (ref 6.1–8)
RBC # BLD AUTO: 4.79 MILL/MM3 (ref 4.7–6.1)
SODIUM SERPL-SCNC: 143 MEQ/L (ref 135–145)
WBC # BLD AUTO: 8.7 THOU/MM3 (ref 4.8–10.8)

## 2023-11-14 PROCEDURE — 2500000003 HC RX 250 WO HCPCS: Performed by: SURGERY

## 2023-11-14 PROCEDURE — 1200000000 HC SEMI PRIVATE

## 2023-11-14 PROCEDURE — 6360000002 HC RX W HCPCS: Performed by: NURSE PRACTITIONER

## 2023-11-14 PROCEDURE — A4216 STERILE WATER/SALINE, 10 ML: HCPCS | Performed by: NURSE PRACTITIONER

## 2023-11-14 PROCEDURE — 3600000019 HC SURGERY ROBOT ADDTL 15MIN: Performed by: SURGERY

## 2023-11-14 PROCEDURE — 83690 ASSAY OF LIPASE: CPT

## 2023-11-14 PROCEDURE — 36415 COLL VENOUS BLD VENIPUNCTURE: CPT

## 2023-11-14 PROCEDURE — 2580000003 HC RX 258: Performed by: NURSE PRACTITIONER

## 2023-11-14 PROCEDURE — 82948 REAGENT STRIP/BLOOD GLUCOSE: CPT

## 2023-11-14 PROCEDURE — 3700000001 HC ADD 15 MINUTES (ANESTHESIA): Performed by: SURGERY

## 2023-11-14 PROCEDURE — 2500000003 HC RX 250 WO HCPCS: Performed by: NURSE ANESTHETIST, CERTIFIED REGISTERED

## 2023-11-14 PROCEDURE — 3700000000 HC ANESTHESIA ATTENDED CARE: Performed by: SURGERY

## 2023-11-14 PROCEDURE — 6360000002 HC RX W HCPCS: Performed by: SURGERY

## 2023-11-14 PROCEDURE — S2900 ROBOTIC SURGICAL SYSTEM: HCPCS | Performed by: SURGERY

## 2023-11-14 PROCEDURE — 7100000001 HC PACU RECOVERY - ADDTL 15 MIN: Performed by: SURGERY

## 2023-11-14 PROCEDURE — C9113 INJ PANTOPRAZOLE SODIUM, VIA: HCPCS | Performed by: NURSE PRACTITIONER

## 2023-11-14 PROCEDURE — 6370000000 HC RX 637 (ALT 250 FOR IP): Performed by: SURGERY

## 2023-11-14 PROCEDURE — 6370000000 HC RX 637 (ALT 250 FOR IP)

## 2023-11-14 PROCEDURE — 6360000002 HC RX W HCPCS: Performed by: NURSE ANESTHETIST, CERTIFIED REGISTERED

## 2023-11-14 PROCEDURE — C1889 IMPLANT/INSERT DEVICE, NOC: HCPCS | Performed by: SURGERY

## 2023-11-14 PROCEDURE — 0FT44ZZ RESECTION OF GALLBLADDER, PERCUTANEOUS ENDOSCOPIC APPROACH: ICD-10-PCS | Performed by: SURGERY

## 2023-11-14 PROCEDURE — 7100000000 HC PACU RECOVERY - FIRST 15 MIN: Performed by: SURGERY

## 2023-11-14 PROCEDURE — 82248 BILIRUBIN DIRECT: CPT

## 2023-11-14 PROCEDURE — 2580000003 HC RX 258: Performed by: SURGERY

## 2023-11-14 PROCEDURE — 2580000003 HC RX 258: Performed by: NURSE ANESTHETIST, CERTIFIED REGISTERED

## 2023-11-14 PROCEDURE — 2709999900 HC NON-CHARGEABLE SUPPLY: Performed by: SURGERY

## 2023-11-14 PROCEDURE — 88304 TISSUE EXAM BY PATHOLOGIST: CPT

## 2023-11-14 PROCEDURE — 6360000002 HC RX W HCPCS: Performed by: ANESTHESIOLOGY

## 2023-11-14 PROCEDURE — 3600000009 HC SURGERY ROBOT BASE: Performed by: SURGERY

## 2023-11-14 PROCEDURE — 8E0W4CZ ROBOTIC ASSISTED PROCEDURE OF TRUNK REGION, PERCUTANEOUS ENDOSCOPIC APPROACH: ICD-10-PCS | Performed by: SURGERY

## 2023-11-14 PROCEDURE — 80053 COMPREHEN METABOLIC PANEL: CPT

## 2023-11-14 PROCEDURE — 2720000010 HC SURG SUPPLY STERILE: Performed by: SURGERY

## 2023-11-14 PROCEDURE — 85027 COMPLETE CBC AUTOMATED: CPT

## 2023-11-14 PROCEDURE — 99232 SBSQ HOSP IP/OBS MODERATE 35: CPT

## 2023-11-14 DEVICE — CLIP INT L POLYMER LOK LIG HEM O LOK (6EA/PK): Type: IMPLANTABLE DEVICE | Status: FUNCTIONAL

## 2023-11-14 RX ORDER — SODIUM CHLORIDE 0.9 % (FLUSH) 0.9 %
5-40 SYRINGE (ML) INJECTION EVERY 12 HOURS SCHEDULED
Status: DISCONTINUED | OUTPATIENT
Start: 2023-11-14 | End: 2023-11-16 | Stop reason: HOSPADM

## 2023-11-14 RX ORDER — SODIUM CHLORIDE 9 MG/ML
INJECTION, SOLUTION INTRAVENOUS PRN
Status: DISCONTINUED | OUTPATIENT
Start: 2023-11-14 | End: 2023-11-16 | Stop reason: HOSPADM

## 2023-11-14 RX ORDER — ONDANSETRON 2 MG/ML
4 INJECTION INTRAMUSCULAR; INTRAVENOUS EVERY 6 HOURS PRN
Status: DISCONTINUED | OUTPATIENT
Start: 2023-11-14 | End: 2023-11-16 | Stop reason: HOSPADM

## 2023-11-14 RX ORDER — LABETALOL HYDROCHLORIDE 5 MG/ML
10 INJECTION INTRAVENOUS
Status: DISCONTINUED | OUTPATIENT
Start: 2023-11-14 | End: 2023-11-14 | Stop reason: HOSPADM

## 2023-11-14 RX ORDER — MORPHINE SULFATE 2 MG/ML
2 INJECTION, SOLUTION INTRAMUSCULAR; INTRAVENOUS EVERY 5 MIN PRN
Status: DISCONTINUED | OUTPATIENT
Start: 2023-11-14 | End: 2023-11-14 | Stop reason: HOSPADM

## 2023-11-14 RX ORDER — OXYCODONE HYDROCHLORIDE 5 MG/1
5 TABLET ORAL EVERY 4 HOURS PRN
Status: DISCONTINUED | OUTPATIENT
Start: 2023-11-14 | End: 2023-11-16 | Stop reason: HOSPADM

## 2023-11-14 RX ORDER — MIDAZOLAM HYDROCHLORIDE 1 MG/ML
INJECTION INTRAMUSCULAR; INTRAVENOUS PRN
Status: DISCONTINUED | OUTPATIENT
Start: 2023-11-14 | End: 2023-11-14 | Stop reason: SDUPTHER

## 2023-11-14 RX ORDER — ONDANSETRON 4 MG/1
4 TABLET, ORALLY DISINTEGRATING ORAL EVERY 8 HOURS PRN
Status: DISCONTINUED | OUTPATIENT
Start: 2023-11-14 | End: 2023-11-16 | Stop reason: HOSPADM

## 2023-11-14 RX ORDER — SODIUM CHLORIDE 0.9 % (FLUSH) 0.9 %
5-40 SYRINGE (ML) INJECTION EVERY 12 HOURS SCHEDULED
Status: DISCONTINUED | OUTPATIENT
Start: 2023-11-14 | End: 2023-11-14 | Stop reason: HOSPADM

## 2023-11-14 RX ORDER — FAMOTIDINE 20 MG/1
20 TABLET, FILM COATED ORAL 2 TIMES DAILY
Status: DISCONTINUED | OUTPATIENT
Start: 2023-11-14 | End: 2023-11-14 | Stop reason: SDUPTHER

## 2023-11-14 RX ORDER — OXYCODONE HYDROCHLORIDE 5 MG/1
10 TABLET ORAL EVERY 4 HOURS PRN
Status: DISCONTINUED | OUTPATIENT
Start: 2023-11-14 | End: 2023-11-16 | Stop reason: HOSPADM

## 2023-11-14 RX ORDER — SUCCINYLCHOLINE/SOD CL,ISO/PF 200MG/10ML
SYRINGE (ML) INTRAVENOUS PRN
Status: DISCONTINUED | OUTPATIENT
Start: 2023-11-14 | End: 2023-11-14 | Stop reason: SDUPTHER

## 2023-11-14 RX ORDER — INDOCYANINE GREEN AND WATER 25 MG
KIT INJECTION PRN
Status: DISCONTINUED | OUTPATIENT
Start: 2023-11-14 | End: 2023-11-14 | Stop reason: ALTCHOICE

## 2023-11-14 RX ORDER — LIDOCAINE HCL/PF 100 MG/5ML
SYRINGE (ML) INJECTION PRN
Status: DISCONTINUED | OUTPATIENT
Start: 2023-11-14 | End: 2023-11-14 | Stop reason: SDUPTHER

## 2023-11-14 RX ORDER — SODIUM CHLORIDE 9 MG/ML
INJECTION, SOLUTION INTRAVENOUS CONTINUOUS
Status: DISCONTINUED | OUTPATIENT
Start: 2023-11-14 | End: 2023-11-15

## 2023-11-14 RX ORDER — ROCURONIUM BROMIDE 10 MG/ML
INJECTION, SOLUTION INTRAVENOUS PRN
Status: DISCONTINUED | OUTPATIENT
Start: 2023-11-14 | End: 2023-11-14 | Stop reason: SDUPTHER

## 2023-11-14 RX ORDER — MORPHINE SULFATE 4 MG/ML
4 INJECTION, SOLUTION INTRAMUSCULAR; INTRAVENOUS
Status: DISCONTINUED | OUTPATIENT
Start: 2023-11-14 | End: 2023-11-14 | Stop reason: SDUPTHER

## 2023-11-14 RX ORDER — SODIUM CHLORIDE 0.9 % (FLUSH) 0.9 %
5-40 SYRINGE (ML) INJECTION PRN
Status: DISCONTINUED | OUTPATIENT
Start: 2023-11-14 | End: 2023-11-16 | Stop reason: HOSPADM

## 2023-11-14 RX ORDER — SODIUM CHLORIDE 9 MG/ML
INJECTION, SOLUTION INTRAVENOUS PRN
Status: DISCONTINUED | OUTPATIENT
Start: 2023-11-14 | End: 2023-11-14 | Stop reason: HOSPADM

## 2023-11-14 RX ORDER — SODIUM CHLORIDE 9 MG/ML
INJECTION, SOLUTION INTRAVENOUS CONTINUOUS PRN
Status: DISCONTINUED | OUTPATIENT
Start: 2023-11-14 | End: 2023-11-14 | Stop reason: SDUPTHER

## 2023-11-14 RX ORDER — HYDROXYZINE HYDROCHLORIDE 25 MG/1
25 TABLET, FILM COATED ORAL 3 TIMES DAILY PRN
Status: DISCONTINUED | OUTPATIENT
Start: 2023-11-14 | End: 2023-11-16 | Stop reason: HOSPADM

## 2023-11-14 RX ORDER — ONDANSETRON 2 MG/ML
INJECTION INTRAMUSCULAR; INTRAVENOUS PRN
Status: DISCONTINUED | OUTPATIENT
Start: 2023-11-14 | End: 2023-11-14 | Stop reason: SDUPTHER

## 2023-11-14 RX ORDER — MORPHINE SULFATE 2 MG/ML
2 INJECTION, SOLUTION INTRAMUSCULAR; INTRAVENOUS
Status: DISCONTINUED | OUTPATIENT
Start: 2023-11-14 | End: 2023-11-14 | Stop reason: SDUPTHER

## 2023-11-14 RX ORDER — FENTANYL CITRATE 50 UG/ML
INJECTION, SOLUTION INTRAMUSCULAR; INTRAVENOUS PRN
Status: DISCONTINUED | OUTPATIENT
Start: 2023-11-14 | End: 2023-11-14 | Stop reason: SDUPTHER

## 2023-11-14 RX ORDER — FENTANYL CITRATE 50 UG/ML
50 INJECTION, SOLUTION INTRAMUSCULAR; INTRAVENOUS EVERY 5 MIN PRN
Status: COMPLETED | OUTPATIENT
Start: 2023-11-14 | End: 2023-11-14

## 2023-11-14 RX ORDER — HYDROMORPHONE HYDROCHLORIDE 2 MG/ML
INJECTION, SOLUTION INTRAMUSCULAR; INTRAVENOUS; SUBCUTANEOUS PRN
Status: DISCONTINUED | OUTPATIENT
Start: 2023-11-14 | End: 2023-11-14 | Stop reason: SDUPTHER

## 2023-11-14 RX ORDER — PROPOFOL 10 MG/ML
INJECTION, EMULSION INTRAVENOUS PRN
Status: DISCONTINUED | OUTPATIENT
Start: 2023-11-14 | End: 2023-11-14 | Stop reason: SDUPTHER

## 2023-11-14 RX ORDER — DEXAMETHASONE SODIUM PHOSPHATE 10 MG/ML
INJECTION, EMULSION INTRAMUSCULAR; INTRAVENOUS PRN
Status: DISCONTINUED | OUTPATIENT
Start: 2023-11-14 | End: 2023-11-14 | Stop reason: SDUPTHER

## 2023-11-14 RX ORDER — BUPIVACAINE HYDROCHLORIDE 5 MG/ML
INJECTION, SOLUTION EPIDURAL; INTRACAUDAL PRN
Status: DISCONTINUED | OUTPATIENT
Start: 2023-11-14 | End: 2023-11-14 | Stop reason: ALTCHOICE

## 2023-11-14 RX ORDER — SODIUM CHLORIDE 0.9 % (FLUSH) 0.9 %
5-40 SYRINGE (ML) INJECTION PRN
Status: DISCONTINUED | OUTPATIENT
Start: 2023-11-14 | End: 2023-11-14 | Stop reason: HOSPADM

## 2023-11-14 RX ORDER — ACETAMINOPHEN 325 MG/1
650 TABLET ORAL EVERY 4 HOURS PRN
Status: DISCONTINUED | OUTPATIENT
Start: 2023-11-14 | End: 2023-11-16 | Stop reason: HOSPADM

## 2023-11-14 RX ADMIN — PRAZOSIN HYDROCHLORIDE 1 MG: 1 CAPSULE ORAL at 19:54

## 2023-11-14 RX ADMIN — SERTRALINE 150 MG: 100 TABLET, FILM COATED ORAL at 09:51

## 2023-11-14 RX ADMIN — MORPHINE SULFATE 4 MG: 4 INJECTION, SOLUTION INTRAMUSCULAR; INTRAVENOUS at 19:30

## 2023-11-14 RX ADMIN — BUPROPION HYDROCHLORIDE 150 MG: 150 TABLET, EXTENDED RELEASE ORAL at 09:51

## 2023-11-14 RX ADMIN — PROPOFOL 200 MG: 10 INJECTION, EMULSION INTRAVENOUS at 17:12

## 2023-11-14 RX ADMIN — PIPERACILLIN AND TAZOBACTAM 4500 MG: 4; .5 INJECTION, POWDER, LYOPHILIZED, FOR SOLUTION INTRAVENOUS at 09:53

## 2023-11-14 RX ADMIN — MIDAZOLAM 2 MG: 1 INJECTION INTRAMUSCULAR; INTRAVENOUS at 17:12

## 2023-11-14 RX ADMIN — HYDROMORPHONE HYDROCHLORIDE 0.5 MG: 2 INJECTION INTRAMUSCULAR; INTRAVENOUS; SUBCUTANEOUS at 18:45

## 2023-11-14 RX ADMIN — SODIUM CHLORIDE: 9 INJECTION, SOLUTION INTRAVENOUS at 05:15

## 2023-11-14 RX ADMIN — FENTANYL CITRATE 50 MCG: 50 INJECTION, SOLUTION INTRAMUSCULAR; INTRAVENOUS at 18:53

## 2023-11-14 RX ADMIN — RISPERIDONE 0.5 MG: 0.25 TABLET, FILM COATED ORAL at 09:50

## 2023-11-14 RX ADMIN — FENTANYL CITRATE 50 MCG: 50 INJECTION, SOLUTION INTRAMUSCULAR; INTRAVENOUS at 17:59

## 2023-11-14 RX ADMIN — MORPHINE SULFATE 4 MG: 4 INJECTION, SOLUTION INTRAMUSCULAR; INTRAVENOUS at 21:31

## 2023-11-14 RX ADMIN — ONDANSETRON 4 MG: 2 INJECTION INTRAMUSCULAR; INTRAVENOUS at 10:22

## 2023-11-14 RX ADMIN — BUPROPION HYDROCHLORIDE 150 MG: 150 TABLET, EXTENDED RELEASE ORAL at 19:54

## 2023-11-14 RX ADMIN — PIPERACILLIN AND TAZOBACTAM 4500 MG: 4; .5 INJECTION, POWDER, LYOPHILIZED, FOR SOLUTION INTRAVENOUS at 17:26

## 2023-11-14 RX ADMIN — ATENOLOL 100 MG: 100 TABLET ORAL at 09:50

## 2023-11-14 RX ADMIN — SODIUM CHLORIDE: 9 INJECTION, SOLUTION INTRAVENOUS at 19:41

## 2023-11-14 RX ADMIN — ROCURONIUM BROMIDE 45 MG: 10 INJECTION INTRAVENOUS at 17:18

## 2023-11-14 RX ADMIN — RISPERIDONE 0.5 MG: 0.25 TABLET, FILM COATED ORAL at 19:54

## 2023-11-14 RX ADMIN — FENTANYL CITRATE 100 MCG: 50 INJECTION, SOLUTION INTRAMUSCULAR; INTRAVENOUS at 17:12

## 2023-11-14 RX ADMIN — FAMOTIDINE 20 MG: 20 TABLET ORAL at 19:54

## 2023-11-14 RX ADMIN — FENTANYL CITRATE 50 MCG: 50 INJECTION, SOLUTION INTRAMUSCULAR; INTRAVENOUS at 17:45

## 2023-11-14 RX ADMIN — ROCURONIUM BROMIDE 5 MG: 10 INJECTION INTRAVENOUS at 17:12

## 2023-11-14 RX ADMIN — ATORVASTATIN CALCIUM 10 MG: 10 TABLET, FILM COATED ORAL at 19:54

## 2023-11-14 RX ADMIN — FAMOTIDINE 20 MG: 20 TABLET ORAL at 09:49

## 2023-11-14 RX ADMIN — SUGAMMADEX 400 MG: 100 INJECTION, SOLUTION INTRAVENOUS at 18:24

## 2023-11-14 RX ADMIN — FENTANYL CITRATE 50 MCG: 50 INJECTION, SOLUTION INTRAMUSCULAR; INTRAVENOUS at 18:58

## 2023-11-14 RX ADMIN — ONDANSETRON 4 MG: 2 INJECTION INTRAMUSCULAR; INTRAVENOUS at 19:41

## 2023-11-14 RX ADMIN — ONDANSETRON 4 MG: 2 INJECTION INTRAMUSCULAR; INTRAVENOUS at 18:24

## 2023-11-14 RX ADMIN — Medication 80 MG: at 17:12

## 2023-11-14 RX ADMIN — DEXAMETHASONE SODIUM PHOSPHATE 10 MG: 10 INJECTION, EMULSION INTRAMUSCULAR; INTRAVENOUS at 17:24

## 2023-11-14 RX ADMIN — PROPOFOL 100 MG: 10 INJECTION, EMULSION INTRAVENOUS at 18:24

## 2023-11-14 RX ADMIN — PIPERACILLIN AND TAZOBACTAM 4500 MG: 4; .5 INJECTION, POWDER, LYOPHILIZED, FOR SOLUTION INTRAVENOUS at 01:49

## 2023-11-14 RX ADMIN — HYDROMORPHONE HYDROCHLORIDE 0.5 MG: 2 INJECTION INTRAMUSCULAR; INTRAVENOUS; SUBCUTANEOUS at 18:27

## 2023-11-14 RX ADMIN — PANTOPRAZOLE SODIUM 40 MG: 40 INJECTION, POWDER, FOR SOLUTION INTRAVENOUS at 09:49

## 2023-11-14 RX ADMIN — Medication 180 MG: at 17:12

## 2023-11-14 RX ADMIN — SODIUM CHLORIDE: 9 INJECTION, SOLUTION INTRAVENOUS at 17:06

## 2023-11-14 ASSESSMENT — PAIN - FUNCTIONAL ASSESSMENT
PAIN_FUNCTIONAL_ASSESSMENT: ACTIVITIES ARE NOT PREVENTED
PAIN_FUNCTIONAL_ASSESSMENT: PREVENTS OR INTERFERES SOME ACTIVE ACTIVITIES AND ADLS

## 2023-11-14 ASSESSMENT — PAIN SCALES - GENERAL
PAINLEVEL_OUTOF10: 10
PAINLEVEL_OUTOF10: 10
PAINLEVEL_OUTOF10: 8
PAINLEVEL_OUTOF10: 8
PAINLEVEL_OUTOF10: 10
PAINLEVEL_OUTOF10: 10

## 2023-11-14 ASSESSMENT — PAIN DESCRIPTION - ORIENTATION
ORIENTATION: MID
ORIENTATION: MID

## 2023-11-14 ASSESSMENT — PAIN DESCRIPTION - LOCATION
LOCATION: ABDOMEN
LOCATION: ABDOMEN

## 2023-11-14 ASSESSMENT — PAIN DESCRIPTION - DESCRIPTORS
DESCRIPTORS: DISCOMFORT
DESCRIPTORS: ACHING

## 2023-11-14 NOTE — PROGRESS NOTES
1841: pt arrives to pacu. OPA in place. Pt on 8L simple mask. Pt responds to verbal stimulation. VSS. Respirations unlabored. X4 sites on abdomen   1850: pt resting in bed. Pt on 3L nasal cannula   1853: pt given 50mcg of fentanyl   1858: pt given 50mcg of fentanyl   1900: report called to [address] [place] [address]: pt resting in bed with eyes closed   1918: pt meets discharge criteria from pacu. 1920: transport arrives.  Pt transported to [facility]

## 2023-11-14 NOTE — CARE COORDINATION
Case Management Assessment  Initial Evaluation    Date/Time of Evaluation: 11/14/2023 9:05 AM  Assessment Completed by: Lexie Lam RN    If patient is discharged prior to next notation, then this note serves as note for discharge by case management. Patient Name: Vance Holstein                   YOB: 1980  Diagnosis: Acute calculous cholecystitis [K80.00]                   Date / Time: 11/13/2023  3:07 PM  Location: [address] [place] [address]     Patient Admission Status: Inpatient   Readmission Risk Low 0-14, Mod 15-19), High > 20: Readmission Risk Score: 8.8    Current PCP: Ryan Matt MD  PCP verified by CM? Yes    Chart Reviewed: Yes      History Provided by: Patient  Patient Orientation: Alert and Oriented    Patient Cognition: Alert    Hospitalization in the last 30 days (Readmission):  No    If yes, Readmission Assessment in CM Navigator will be completed. Advance Directives:      Code Status: Full Code   Patient's Primary Decision Maker is: Legal Next of Kin      Discharge Planning:    Patient lives with: Spouse/Significant Other, Children Type of Home: Apartment  Primary Care Giver: Self  Patient Support Systems include: Spouse/Significant Other, Children, Family Members   Current Financial resources: Medicaid  Current community resources: None  Current services prior to admission: None            Current DME:              Type of Home Care services:  None    ADLS  Prior functional level: Independent in ADLs/IADLs  Current functional level: Independent in ADLs/IADLs    Family can provide assistance at DC: Yes  Would you like Case Management to discuss the discharge plan with any other family members/significant others, and if so, who?     Plans to Return to Present Housing: Yes  Other Identified Issues/Barriers to RETURNING to current housing: None  Potential Assistance needed at discharge: N/A            Potential DME:    Patient expects to discharge to: [address] [place] [address]

## 2023-11-14 NOTE — CONSULTS
No results for input(s): \"INR\" in the last 72 hours. No results for input(s): \"CKTOTAL\", \"TROPONINI\" in the last 72 hours. Urinalysis:    Lab Results   Component Value Date/Time    NITRU NEGATIVE 11/13/2023 08:33 AM    WBCUA 0 TO 2 11/13/2023 08:33 AM    BACTERIA TRACE 11/13/2023 08:33 AM    RBCUA None 11/13/2023 08:33 AM    SPECGRAV 1.015 11/13/2023 08:33 AM    GLUCOSEU NEGATIVE 11/13/2023 08:33 AM       Radiology:   No orders to display     US GALLBLADDER RUQ    Result Date: 11/13/2023  EXAMINATION: RIGHT UPPER QUADRANT ULTRASOUND 11/13/2023 7:18 am COMPARISON: None. HISTORY: ORDERING SYSTEM PROVIDED HISTORY: RUQ pain TECHNOLOGIST PROVIDED HISTORY: RUQ pain 66-year-old male with right upper quadrant abdominal pain FINDINGS: LIVER:  Increased echogenicity throughout the hepatic parenchyma consistent with fatty infiltration of the liver. Color flow projects over the main portal vein with a normal hepatopetal, monophasic waveform. No intrahepatic biliary ductal dilation. BILIARY SYSTEM:  Cholelithiasis. Gallbladder wall thickness normal measuring 2 mm. No pericholecystic fluid. Sonographic Clay sign is positive. Common bile duct is within normal limits measuring 5 mm. RIGHT KIDNEY: Right kidney measures 10.2 x 5.6 x 5.5 cm. Right renal cortical thickness measures 1.6 cm. No gross right-sided hydronephrosis. PANCREAS:  Visualized portions of the pancreas are unremarkable. OTHER: No evidence of right upper quadrant ascites. 1. Cholelithiasis. Positive sonographic Clay sign. Further evaluation with nuclear medicine HIDA scan is recommended if there is concern for acute cholecystitis. 2. Fatty liver.          EKG:  NSR      THANK YOU FOR THE CONSULT    Electronically signed by Gely Hahn PA-C on 11/13/2023 at 7:32 PM

## 2023-11-14 NOTE — ANESTHESIA PRE PROCEDURE
regular                      Neuro/Psych:   (+) psychiatric history:            GI/Hepatic/Renal:   (+) GERD:, morbid obesity          Endo/Other:    (+) Diabetes, . Abdominal:   (+) obese,           Vascular: Other Findings:           Anesthesia Plan      general     ASA 3       Induction: intravenous. MIPS: Postoperative opioids intended and Prophylactic antiemetics administered. Anesthetic plan and risks discussed with patient and spouse. Plan discussed with CRNA.                     Tobias More MD   11/14/2023

## 2023-11-14 NOTE — PROGRESS NOTES
[address], APRN-CNP for Dr. London Crowe Surgery Daily Progress Note    Pt Name: eTodora Lemon  Medical Record Number: [id number]  Date of Birth 1980   Today's Date: 11/14/2023    Hospital day # 1     ASSESSMENT   Calculus cholecystitis  Fatty liver disease  Morbid obesity (BMI 47)  Hypertension  Diabetes Mellitus    has a past medical history of Anxiety, Chronic left-sided low back pain with left-sided sciatica, Depression, Erectile dysfunction, Essential hypertension, Gastroesophageal reflux disease without esophagitis, Headache, Hyperglycemia, Hyperlipidemia, Hypertension, Intermittent explosive disorder in adult, Irritable bowel syndrome with diarrhea, Morbid obesity with BMI of 45.0-49.9, adult ([address]), Other sleep apnea, Palpitations, Peripheral edema, Positive Clay's Sign, RUQ pain, Type 2 diabetes mellitus with hyperglycemia, without long-term current use of insulin ([address]), and Unintended weight loss. PLAN   NPO  Informed consent  Continue antibiotics  Medicine consulted for risk stratification/medical clearance. Appreciate assistance. Low-mod risk but ok to proceed. Pain & nausea control   SCDs for DVT prophylaxis   IV fluid per medicine  Home medications resumed  [place]ms ACHS with SS coverage as needed  Labs reviewed. LFTs and lipase good. Planning robotic laparoscopic possible open cholecystectomy later this afternoon. Pros and cons of surgical intervention discussed. Possible risks and complications discussed and reviewed. Questions answered. Patient ok to proceed with surgery. SUBJECTIVE   Chief complaint: No complaints    Patient was stable overnight. VS stable. No fevers. Feels well. Chart reviewed. Updated by nursing staff. Denies chest discomfort or dyspnea. No N/V; (+) belching, flatus and had a BM this morning. Tolerating Diet NPO diet. Pain has resolved since last night. Up ad edith. No urinary complaints.    CURRENT MEDICATIONS   Scheduled

## 2023-11-14 NOTE — TELEPHONE ENCOUNTER
[facility] ([facility]) ED Follow up Call    Reason for ED visit:  gallbladder      11/14/2023         FU appts/Provider:    Future Appointments   Date Time Provider [address]   11/21/2023  2:00 PM Zhanna Hernandez MD fp sc [id number]   1/2/2024  1:15 PM Ibis Palafox MD AFL [facility] TIFF [facility]         VOICEMAIL DOCUMENTATION - ERASE IF NOT USED  Hi, this message is for [facility]. This is [employer] EMEKA Saldaña from [address] [place] [address] office. Just calling to see how you are doing after your recent visit to the Emergency Room. Luli Kolb wants to make sure you were able to fill any prescriptions and that you understand your discharge instructions. Please return our call if you need to make a follow up appointment with your provider or have any further needs. Our phone number is [phone]. Have a great day.

## 2023-11-14 NOTE — BRIEF OP NOTE
Brief Postoperative Note      Patient: Lawanda Patterson  YOB: 1980  MRN: [id number]    Date of Procedure: 11/14/2023    Pre-Op Diagnosis Codes:     * Calculus of bile duct with acute cholecystitis without obstruction [K80.42]    Post-Op Diagnosis: Same       Procedure(s):  Robotic Cholecystectomy    Surgeon(s):   Marino Delaney MD    Assistant:  First Assistant: Aureliano Florse RN    Anesthesia: General    Estimated Blood Loss (mL): 67FM    Complications: None    Specimens:   ID Type Source Tests Collected by Time Destination   A : Gallbladder Tissue Gallbladder SURGICAL PATHOLOGY Marino Delaney MD 11/14/2023 1822        Implants:  * No implants in log *      Drains: * No LDAs found *    Findings: as above - see op note for details      Electronically signed by Marino Delaney MD on 11/14/2023 at 6:27 PM

## 2023-11-14 NOTE — PROGRESS NOTES
Hospitalist Progress Note    Patient:  Jenn Jean    YOB: 1980  Unit/Bed:5K-04/004-A  Date of Admission: 11/13/2023      Assessment/Plan:    Calculous Cholecystitis: noted on GB ultrasound. + Clay sign. CBD appears wnl. Managed per general surgery, Dr. Altaf Jc. Leukocytosis resolved. Plan for surgery 11/4. Preoperative Risk Stratification: RCRI 1, indicating 6%  30-day risk of death, MI or cardiac arrest.  METS estimated to be 5-6. Patient is without chest pain, shortness of breath. EKG reveals normal sinus rhythm, CXR shows no acute disease. [facility] ACS risk calculator shows patient is of average risk of surgical complications. Given PMHx, labs, EKG, and CXR pt is low-moderate risk for postoperative heart related complications following surgery. Lasix and Lisinopril in anticipation of operative management of #1. Essential HTN: continue home atenolol. Hold lasix and lisinopril in anticipation of surgery, may resume following surgery. NIDDMII: Hold metformin on admission. Low dose Sliding scale insulin. Hypoglycemia protocol. Accu checks. Carb controlled diet. HLD: atorvastatin    Psychiatric Disorders: Hx depression/anxiety. Continue Zoloft, bupropion and Risperdal    Obesity: BMI 47.58 kg/m2      Chief Complaint: abdominal pain    HPI / Hospital Course:   Per initial consult 11/13 Zana [email or web address] who we are asked to see/evaluate by Izzy Arrieta MD for medical management of abdominal pain. Patient was seen earlier today at [facility] ED, found to have calculus cholecystitis. Patient reports ongoing issues with his gallbladder. Patient reports pain to be nonexistent at this time. He reports his health has been stable otherwise. He denies chest pain, shortness of breath at rest or with activity. He reports she has been compliant with his medications daily. \"     11/14: Plan for laparoscopic cholecystectomy today    Subjective (past 24 hours):

## 2023-11-15 LAB
ALBUMIN SERPL BCG-MCNC: 3.9 G/DL (ref 3.5–5.1)
ALP SERPL-CCNC: 65 U/L (ref 38–126)
ALT SERPL W/O P-5'-P-CCNC: 21 U/L (ref 11–66)
ANION GAP SERPL CALC-SCNC: 13 MEQ/L (ref 8–16)
AST SERPL-CCNC: 26 U/L (ref 5–40)
BASOPHILS ABSOLUTE: 0 THOU/MM3 (ref 0–0.1)
BASOPHILS NFR BLD AUTO: 0.2 %
BILIRUB CONJ SERPL-MCNC: < 0.2 MG/DL (ref 0–0.3)
BILIRUB SERPL-MCNC: 0.6 MG/DL (ref 0.3–1.2)
BUN SERPL-MCNC: 11 MG/DL (ref 7–22)
CALCIUM SERPL-MCNC: 9 MG/DL (ref 8.5–10.5)
CHLORIDE SERPL-SCNC: 104 MEQ/L (ref 98–111)
CO2 SERPL-SCNC: 23 MEQ/L (ref 23–33)
CREAT SERPL-MCNC: 1 MG/DL (ref 0.4–1.2)
DEPRECATED RDW RBC AUTO: 47.3 FL (ref 35–45)
EOSINOPHIL NFR BLD AUTO: 0 %
EOSINOPHILS ABSOLUTE: 0 THOU/MM3 (ref 0–0.4)
ERYTHROCYTE [DISTWIDTH] IN BLOOD BY AUTOMATED COUNT: 14.9 % (ref 11.5–14.5)
GFR SERPL CREATININE-BSD FRML MDRD: > 60 ML/MIN/1.73M2
GLUCOSE BLD STRIP.AUTO-MCNC: 135 MG/DL (ref 70–108)
GLUCOSE BLD STRIP.AUTO-MCNC: 139 MG/DL (ref 70–108)
GLUCOSE BLD STRIP.AUTO-MCNC: 147 MG/DL (ref 70–108)
GLUCOSE BLD STRIP.AUTO-MCNC: 181 MG/DL (ref 70–108)
GLUCOSE SERPL-MCNC: 143 MG/DL (ref 70–108)
HCT VFR BLD AUTO: 47.2 % (ref 42–52)
HGB BLD-MCNC: 15.2 GM/DL (ref 14–18)
IMM GRANULOCYTES # BLD AUTO: 0.1 THOU/MM3 (ref 0–0.07)
IMM GRANULOCYTES NFR BLD AUTO: 0.5 %
LYMPHOCYTES ABSOLUTE: 1.1 THOU/MM3 (ref 1–4.8)
LYMPHOCYTES NFR BLD AUTO: 5.7 %
MCH RBC QN AUTO: 27.9 PG (ref 26–33)
MCHC RBC AUTO-ENTMCNC: 32.2 GM/DL (ref 32.2–35.5)
MCV RBC AUTO: 86.8 FL (ref 80–94)
MONOCYTES ABSOLUTE: 1.1 THOU/MM3 (ref 0.4–1.3)
MONOCYTES NFR BLD AUTO: 5.4 %
NEUTROPHILS NFR BLD AUTO: 88.2 %
NRBC BLD AUTO-RTO: 0 /100 WBC
PLATELET # BLD AUTO: 230 THOU/MM3 (ref 130–400)
PMV BLD AUTO: 10.9 FL (ref 9.4–12.4)
POTASSIUM SERPL-SCNC: 4.5 MEQ/L (ref 3.5–5.2)
PROT SERPL-MCNC: 7.6 G/DL (ref 6.1–8)
RBC # BLD AUTO: 5.44 MILL/MM3 (ref 4.7–6.1)
SEGMENTED NEUTROPHILS ABSOLUTE COUNT: 17.3 THOU/MM3 (ref 1.8–7.7)
SODIUM SERPL-SCNC: 140 MEQ/L (ref 135–145)
WBC # BLD AUTO: 19.6 THOU/MM3 (ref 4.8–10.8)

## 2023-11-15 PROCEDURE — 6360000002 HC RX W HCPCS: Performed by: SURGERY

## 2023-11-15 PROCEDURE — 80053 COMPREHEN METABOLIC PANEL: CPT

## 2023-11-15 PROCEDURE — 6360000002 HC RX W HCPCS: Performed by: NURSE PRACTITIONER

## 2023-11-15 PROCEDURE — 36415 COLL VENOUS BLD VENIPUNCTURE: CPT

## 2023-11-15 PROCEDURE — 82948 REAGENT STRIP/BLOOD GLUCOSE: CPT

## 2023-11-15 PROCEDURE — 1200000000 HC SEMI PRIVATE

## 2023-11-15 PROCEDURE — 2580000003 HC RX 258: Performed by: SURGERY

## 2023-11-15 PROCEDURE — A4216 STERILE WATER/SALINE, 10 ML: HCPCS | Performed by: SURGERY

## 2023-11-15 PROCEDURE — 85025 COMPLETE CBC W/AUTO DIFF WBC: CPT

## 2023-11-15 PROCEDURE — 99232 SBSQ HOSP IP/OBS MODERATE 35: CPT

## 2023-11-15 PROCEDURE — 6370000000 HC RX 637 (ALT 250 FOR IP): Performed by: SURGERY

## 2023-11-15 PROCEDURE — C9113 INJ PANTOPRAZOLE SODIUM, VIA: HCPCS | Performed by: SURGERY

## 2023-11-15 PROCEDURE — 82248 BILIRUBIN DIRECT: CPT

## 2023-11-15 RX ORDER — DOCUSATE SODIUM 100 MG/1
100 CAPSULE, LIQUID FILLED ORAL 2 TIMES DAILY PRN
Status: DISCONTINUED | OUTPATIENT
Start: 2023-11-15 | End: 2023-11-16 | Stop reason: HOSPADM

## 2023-11-15 RX ORDER — KETOROLAC TROMETHAMINE 30 MG/ML
30 INJECTION, SOLUTION INTRAMUSCULAR; INTRAVENOUS EVERY 6 HOURS
Status: DISCONTINUED | OUTPATIENT
Start: 2023-11-15 | End: 2023-11-16 | Stop reason: HOSPADM

## 2023-11-15 RX ORDER — ENOXAPARIN SODIUM 100 MG/ML
60 INJECTION SUBCUTANEOUS 2 TIMES DAILY
Status: DISCONTINUED | OUTPATIENT
Start: 2023-11-15 | End: 2023-11-16 | Stop reason: HOSPADM

## 2023-11-15 RX ADMIN — ATENOLOL 100 MG: 100 TABLET ORAL at 09:10

## 2023-11-15 RX ADMIN — OXYCODONE HYDROCHLORIDE 5 MG: 5 TABLET ORAL at 20:28

## 2023-11-15 RX ADMIN — SODIUM CHLORIDE, PRESERVATIVE FREE 10 ML: 5 INJECTION INTRAVENOUS at 08:59

## 2023-11-15 RX ADMIN — OXYCODONE HYDROCHLORIDE 5 MG: 5 TABLET ORAL at 11:53

## 2023-11-15 RX ADMIN — PRAZOSIN HYDROCHLORIDE 1 MG: 1 CAPSULE ORAL at 20:29

## 2023-11-15 RX ADMIN — PIPERACILLIN AND TAZOBACTAM 4500 MG: 4; .5 INJECTION, POWDER, LYOPHILIZED, FOR SOLUTION INTRAVENOUS at 01:43

## 2023-11-15 RX ADMIN — ENOXAPARIN SODIUM 60 MG: 100 INJECTION SUBCUTANEOUS at 10:55

## 2023-11-15 RX ADMIN — MORPHINE SULFATE 4 MG: 4 INJECTION, SOLUTION INTRAMUSCULAR; INTRAVENOUS at 01:44

## 2023-11-15 RX ADMIN — KETOROLAC TROMETHAMINE 30 MG: 30 INJECTION, SOLUTION INTRAMUSCULAR; INTRAVENOUS at 16:17

## 2023-11-15 RX ADMIN — ENOXAPARIN SODIUM 60 MG: 100 INJECTION SUBCUTANEOUS at 20:28

## 2023-11-15 RX ADMIN — OXYCODONE HYDROCHLORIDE 10 MG: 5 TABLET ORAL at 04:29

## 2023-11-15 RX ADMIN — PANTOPRAZOLE SODIUM 40 MG: 40 INJECTION, POWDER, FOR SOLUTION INTRAVENOUS at 08:59

## 2023-11-15 RX ADMIN — SERTRALINE 150 MG: 100 TABLET, FILM COATED ORAL at 09:14

## 2023-11-15 RX ADMIN — RISPERIDONE 0.5 MG: 0.25 TABLET, FILM COATED ORAL at 20:30

## 2023-11-15 RX ADMIN — BUPROPION HYDROCHLORIDE 150 MG: 150 TABLET, EXTENDED RELEASE ORAL at 09:13

## 2023-11-15 RX ADMIN — FAMOTIDINE 20 MG: 20 TABLET ORAL at 09:12

## 2023-11-15 RX ADMIN — PIPERACILLIN AND TAZOBACTAM 4500 MG: 4; .5 INJECTION, POWDER, LYOPHILIZED, FOR SOLUTION INTRAVENOUS at 17:49

## 2023-11-15 RX ADMIN — BUPROPION HYDROCHLORIDE 150 MG: 150 TABLET, EXTENDED RELEASE ORAL at 20:30

## 2023-11-15 RX ADMIN — PIPERACILLIN AND TAZOBACTAM 4500 MG: 4; .5 INJECTION, POWDER, LYOPHILIZED, FOR SOLUTION INTRAVENOUS at 09:08

## 2023-11-15 RX ADMIN — KETOROLAC TROMETHAMINE 30 MG: 30 INJECTION, SOLUTION INTRAMUSCULAR; INTRAVENOUS at 20:28

## 2023-11-15 RX ADMIN — FAMOTIDINE 20 MG: 20 TABLET ORAL at 20:29

## 2023-11-15 RX ADMIN — RISPERIDONE 0.5 MG: 0.25 TABLET, FILM COATED ORAL at 09:11

## 2023-11-15 RX ADMIN — ATORVASTATIN CALCIUM 10 MG: 10 TABLET, FILM COATED ORAL at 20:29

## 2023-11-15 RX ADMIN — KETOROLAC TROMETHAMINE 30 MG: 30 INJECTION, SOLUTION INTRAMUSCULAR; INTRAVENOUS at 08:59

## 2023-11-15 ASSESSMENT — PAIN DESCRIPTION - LOCATION
LOCATION: ABDOMEN

## 2023-11-15 ASSESSMENT — PAIN DESCRIPTION - ORIENTATION
ORIENTATION: RIGHT
ORIENTATION: INNER;MID
ORIENTATION: UPPER
ORIENTATION: MID
ORIENTATION: UPPER
ORIENTATION: MID
ORIENTATION: UPPER
ORIENTATION: MID

## 2023-11-15 ASSESSMENT — PAIN SCALES - GENERAL
PAINLEVEL_OUTOF10: 7
PAINLEVEL_OUTOF10: 2
PAINLEVEL_OUTOF10: 7
PAINLEVEL_OUTOF10: 6
PAINLEVEL_OUTOF10: 6
PAINLEVEL_OUTOF10: 8
PAINLEVEL_OUTOF10: 2
PAINLEVEL_OUTOF10: 2
PAINLEVEL_OUTOF10: 7
PAINLEVEL_OUTOF10: 7
PAINLEVEL_OUTOF10: 6

## 2023-11-15 ASSESSMENT — PAIN DESCRIPTION - DESCRIPTORS
DESCRIPTORS: DISCOMFORT
DESCRIPTORS: PRESSURE
DESCRIPTORS: ACHING
DESCRIPTORS: PRESSURE
DESCRIPTORS: ACHING
DESCRIPTORS: ACHING
DESCRIPTORS: THROBBING
DESCRIPTORS: ACHING
DESCRIPTORS: PRESSURE

## 2023-11-15 ASSESSMENT — PAIN - FUNCTIONAL ASSESSMENT
PAIN_FUNCTIONAL_ASSESSMENT: ACTIVITIES ARE NOT PREVENTED

## 2023-11-15 ASSESSMENT — PAIN SCALES - WONG BAKER: WONGBAKER_NUMERICALRESPONSE: 0

## 2023-11-15 NOTE — PROGRESS NOTES
Pharmacist Review and Automatic Dose Adjustment of Prophylactic Enoxaparin         The reviewing pharmacist has made an adjustment to the ordered enoxaparin dose or converted to UFH per the approved [facility] protocol and table as identified below. Yomi Middleton is a 37 y.o. male. Recent Labs     11/13/23  0650 11/14/23  0447   CREATININE 1.0 0.9       Estimated Creatinine Clearance: 214 mL/min (based on SCr of 0.9 mg/dL). Recent Labs     11/14/23  0447 11/15/23  0841   HGB 13.4* 15.2   HCT 41.5* 47.2    230     No results for input(s): \"INR\" in the last 72 hours.     Height:   Ht Readings from Last 1 Encounters:   11/13/23 2.083 m (6' 10\")     Weight:  Wt Readings from Last 1 Encounters:   11/13/23 (!) 206.4 kg (455 lb)               Plan: Based upon the patient's weight and renal function    Ordered: Enoxaparin 40mg SUBQ BID    Changed/converted to    New Order: Enoxaparin 60mg SUBQ BID      Thank you,  Ray Aguirre, [address] [place] [address]  11/15/2023, 10:10 AM

## 2023-11-15 NOTE — RESULT ENCOUNTER NOTE
Addressed in inpatient      Lab Results       Component                Value               Date                       LABA1C                   5.6                 08/16/2023                 LABA1C                   6.6 (H)             03/11/2022                 LABA1C                   5.8                 06/16/2021

## 2023-11-15 NOTE — PROGRESS NOTES
Hospitalist Progress Note    Patient:  Anabella Patiño    YOB: 1980  Unit/Bed:5K-04/004-A  Date of Admission: 11/13/2023      Assessment/Plan:    Calculous Cholecystitis s/p cholecystectomy on 11/14: noted on GB ultrasound. + Clay sign. CBD appears wnl. Managed per general surgery, Dr. Grace Woods. Leukocytosis increased from normal, likely reactive to surgery. Per general surgery, likely discharge with oral ABX later today    Preoperative Risk Stratification: RCRI 1, indicating 6%  30-day risk of death, MI or cardiac arrest.  METS estimated to be 5-6. Patient is without chest pain, shortness of breath. EKG reveals normal sinus rhythm, CXR shows no acute disease. NSQIP ACS risk calculator shows patient is of average risk of surgical complications. Given PMHx, labs, EKG, and CXR pt is low-moderate risk for postoperative heart related complications following surgery. Essential HTN: continue home atenolol. Resume lasix and lisinopril in following  surgery. NIDDMII: Hold metformin on admission. Low dose Sliding scale insulin. Hypoglycemia protocol. Accu checks. Carb controlled diet. HLD: atorvastatin    Psychiatric Disorders: Hx depression/anxiety. Continue Zoloft, bupropion and Risperdal    Obesity: BMI 47.58 kg/m2      Hospitalist will sign off. Thank you for the consult. Please do not hesitate to reach out to us with questions or concerns. Chief Complaint: abdominal pain    HPI / Hospital Course:   Per initial consult 11/13 Jesus Morane y.o. male who we are asked to see/evaluate by Macho Nath MD for medical management of abdominal pain. Patient was seen earlier today at [facility] ED, found to have calculus cholecystitis. Patient reports ongoing issues with his gallbladder. Patient reports pain to be nonexistent at this time. He reports his health has been stable otherwise. He denies chest pain, shortness of breath at rest or with activity.   He reports she

## 2023-11-15 NOTE — PROGRESS NOTES
[address], APRN-CNP for Dr. Marylene Constable Surgery Daily Progress Note    Pt Name: Harriet Khoury  Medical Record Number: [id number]  Date of Birth 1980   Today's Date: 11/15/2023    Hospital day # 2     ASSESSMENT   Calculus cholecystitis - POD # 1 Status post robotic cholecystectomy   Fatty liver disease  Morbid obesity (BMI 47)  Hypertension  Diabetes Mellitus    has a past medical history of Anxiety, Chronic left-sided low back pain with left-sided sciatica, Depression, Erectile dysfunction, Essential hypertension, Gastroesophageal reflux disease without esophagitis, Headache, Hyperglycemia, Hyperlipidemia, Hypertension, Intermittent explosive disorder in adult, Irritable bowel syndrome with diarrhea, Morbid obesity with BMI of 45.0-49.9, adult ([address]), Other sleep apnea, Palpitations, Peripheral edema, Positive Clay's Sign, RUQ pain, Type 2 diabetes mellitus with hyperglycemia, without long-term current use of insulin ([address]), and Unintended weight loss. PLAN   Increase diet as tolerated  Stool softeners as needed  Start Toradol   Continue antibiotics  Pain & nausea control   Lovenox for DVT prophylaxis   Discontinue IV fluids  [place] ACHS with SS coverage as needed  Draw labs this morning  Clinically, looks pretty good. Home today/tomorrow pending on diet and pain goes. Patient does live over an hour a way so a little concerned with the drive home today. Will check on him later. SUBJECTIVE   Chief complaint: Incisional pain     Patient was stable overnight. VS stable. No fevers. Chart reviewed. Updated by nursing staff. Denies chest discomfort or dyspnea. No N/V. Passing some flatus but no BM. Tolerating ADULT DIET; Full Liquid diet. Incisional pain controlled with pain medications. Up ad edith. No urinary complaints. Incisions are clean, dry and intact.    CURRENT MEDICATIONS   Scheduled Meds:   sodium chloride flush  5-40 mL IntraVENous 2 times per day    sodium

## 2023-11-15 NOTE — PROGRESS NOTES
Pt left sitting up in chair. Call light in reach, spouse on couch beside. Reported off to primary nurse.  Frida Martin [place] [facility]

## 2023-11-15 NOTE — CARE COORDINATION
11/15/23, 12:20 PM EST    DISCHARGE ON GOING [facility] day: 2  Location: [facility]04/004-A Reason for admit: Acute calculous cholecystitis [K80.00]   Procedure:   11/13  Ultrasound of gall bladder:  IMPRESSION:  1. Cholelithiasis. Positive sonographic Clay sign. Further evaluation  with nuclear medicine HIDA scan is recommended if there is concern for acute  cholecystitis. 2. Fatty liver. 11/14 Robotic cholecystectomy. Barriers to Discharge: Hospitalist and surgery following. POD 1. Advancing diet. Antibiotics. Incision care. Wound care. Anticipate discharge later today. PCP: Steven Maldonado MD  Readmission Risk Score: 8.7%  Patient Goals/Plan/Treatment Preferences: Home with wife and children, denied needs. 11/15/23, 12:31 PM EST    Patient goals/plan/ treatment preferences discussed by  and . Patient goals/plan/ treatment preferences reviewed with patient/ family. Patient/ family verbalize understanding of discharge plan and are in agreement with goal/plan/treatment preferences. Understanding was demonstrated using the teach back method. AVS provided by RN at time of discharge, which includes all necessary medical information pertaining to the patients current course of illness, treatment, post-discharge goals of care, and treatment preferences.      Services At/After Discharge: None

## 2023-11-15 NOTE — ANESTHESIA POSTPROCEDURE EVALUATION
Department of Anesthesiology  Postprocedure Note    Patient: Erica Rivera  MRN: [id number]  YOB: 1980  Date of evaluation: 11/14/2023      Procedure Summary     Date: 11/14/23 Room / Location: [facility] 06 / [address] [place] [address]    Anesthesia Start: 1706 Anesthesia Stop: 1851    Procedure: Robotic Cholecystectomy Diagnosis:       Calculus of bile duct with acute cholecystitis without obstruction      (Calculus of bile duct with acute cholecystitis without obstruction [K80.42])    Surgeons: Darci Lopez MD Responsible Provider: Ann Geiger MD    Anesthesia Type: general ASA Status: 3          Anesthesia Type: No value filed.     Ej Phase I: Ej Score: 8    Ej Phase II:        Anesthesia Post Evaluation    Patient location during evaluation: PACU  Patient participation: complete - patient participated  Level of consciousness: awake  Airway patency: patent  Nausea & Vomiting: no vomiting and no nausea  Complications: no  Cardiovascular status: hemodynamically stable  Respiratory status: acceptable and nasal cannula  Hydration status: stable  Pain management: adequate

## 2023-11-15 NOTE — OP NOTE
[place] [address]                                OPERATIVE REPORT    PATIENT NAME: Lawanda Patterson                    :        1980  MED REC NO:   [id number]                           ROOM:       0004  ACCOUNT NO:   [de-identified]                           ADMIT DATE: 2023  PROVIDER:     FLORIAN Fieldsileana Butler OF PROCEDURE:  2023    PREOPERATIVE DIAGNOSIS:  Acute calculous cholecystitis. POSTOPERATIVE DIAGNOSIS:  Acute calculous cholecystitis. PROCEDURE:  Robotic cholecystectomy. SURGEON:  Kelsie Mars MD    ASSISTANT:  Rigo Jarrell. Madrigal, [facility]    ANESTHESIA:  General/local.    ESTIMATED BLOOD LOSS:  20 mL. DRAINS:  None. COMPLICATIONS:  None. DISPOSITION:  Stable to the recovery room. INDICATION:  The patient is a 49-year-old male who was found to have  gallbladder disease at an outside facility at [facility].  He was  transferred here given his morbid obesity with a BMI of _____, 455  pounds along with height of 6 feet 10 inches. Both operative and  nonoperative intervention plans were discussed. Risks of surgery were  further discussed. Some of the risks included but were not limited to  bleeding, infection, the need for re-operation, severe chronic  postoperative pain or numbness, major vascular or nerve injury,  cardiopulmonary complications, anesthetic complications, seroma/hematoma  formation, wound breakdown, trocar site herniation, bile leak, bile duct  injury, biloma formation, chronic pain and death. After all of the  questions were answered in their entirety and the patient was completely  aware of current situation, he wished to proceed with the surgery. DESCRIPTION OF THE PROCEDURE:  After informed consent was signed and  placed on the chart, the patient was taken back to the operating room  and placed supine on the operating room table.   General anesthesia

## 2023-11-16 ENCOUNTER — TELEPHONE (OUTPATIENT)
Dept: FAMILY MEDICINE CLINIC | Age: 43
End: 2023-11-16

## 2023-11-16 VITALS
TEMPERATURE: 97.5 F | BODY MASS INDEX: 36.45 KG/M2 | HEIGHT: 78 IN | WEIGHT: 315 LBS | HEART RATE: 63 BPM | OXYGEN SATURATION: 96 % | DIASTOLIC BLOOD PRESSURE: 86 MMHG | SYSTOLIC BLOOD PRESSURE: 134 MMHG | RESPIRATION RATE: 16 BRPM

## 2023-11-16 LAB
ANION GAP SERPL CALC-SCNC: 8 MEQ/L (ref 8–16)
BUN SERPL-MCNC: 19 MG/DL (ref 7–22)
CALCIUM SERPL-MCNC: 8.7 MG/DL (ref 8.5–10.5)
CHLORIDE SERPL-SCNC: 106 MEQ/L (ref 98–111)
CO2 SERPL-SCNC: 28 MEQ/L (ref 23–33)
CREAT SERPL-MCNC: 1.1 MG/DL (ref 0.4–1.2)
DEPRECATED RDW RBC AUTO: 51.2 FL (ref 35–45)
ERYTHROCYTE [DISTWIDTH] IN BLOOD BY AUTOMATED COUNT: 15.4 % (ref 11.5–14.5)
GFR SERPL CREATININE-BSD FRML MDRD: > 60 ML/MIN/1.73M2
GLUCOSE BLD STRIP.AUTO-MCNC: 114 MG/DL (ref 70–108)
GLUCOSE SERPL-MCNC: 125 MG/DL (ref 70–108)
HCT VFR BLD AUTO: 42.5 % (ref 42–52)
HGB BLD-MCNC: 13.1 GM/DL (ref 14–18)
MCH RBC QN AUTO: 28.2 PG (ref 26–33)
MCHC RBC AUTO-ENTMCNC: 30.8 GM/DL (ref 32.2–35.5)
MCV RBC AUTO: 91.6 FL (ref 80–94)
PLATELET # BLD AUTO: 159 THOU/MM3 (ref 130–400)
PMV BLD AUTO: 11.7 FL (ref 9.4–12.4)
POTASSIUM SERPL-SCNC: 4.4 MEQ/L (ref 3.5–5.2)
RBC # BLD AUTO: 4.64 MILL/MM3 (ref 4.7–6.1)
SODIUM SERPL-SCNC: 142 MEQ/L (ref 135–145)
WBC # BLD AUTO: 13.1 THOU/MM3 (ref 4.8–10.8)

## 2023-11-16 PROCEDURE — 6360000002 HC RX W HCPCS: Performed by: NURSE PRACTITIONER

## 2023-11-16 PROCEDURE — 6370000000 HC RX 637 (ALT 250 FOR IP)

## 2023-11-16 PROCEDURE — 82948 REAGENT STRIP/BLOOD GLUCOSE: CPT

## 2023-11-16 PROCEDURE — 6370000000 HC RX 637 (ALT 250 FOR IP): Performed by: SURGERY

## 2023-11-16 PROCEDURE — 2580000003 HC RX 258: Performed by: SURGERY

## 2023-11-16 PROCEDURE — 6360000002 HC RX W HCPCS: Performed by: SURGERY

## 2023-11-16 PROCEDURE — 2580000003 HC RX 258: Performed by: NURSE PRACTITIONER

## 2023-11-16 PROCEDURE — 99024 POSTOP FOLLOW-UP VISIT: CPT | Performed by: NURSE PRACTITIONER

## 2023-11-16 PROCEDURE — 36415 COLL VENOUS BLD VENIPUNCTURE: CPT

## 2023-11-16 PROCEDURE — C9113 INJ PANTOPRAZOLE SODIUM, VIA: HCPCS | Performed by: SURGERY

## 2023-11-16 PROCEDURE — A4216 STERILE WATER/SALINE, 10 ML: HCPCS | Performed by: SURGERY

## 2023-11-16 PROCEDURE — 80048 BASIC METABOLIC PNL TOTAL CA: CPT

## 2023-11-16 PROCEDURE — 85027 COMPLETE CBC AUTOMATED: CPT

## 2023-11-16 RX ORDER — KETOROLAC TROMETHAMINE 10 MG/1
10 TABLET, FILM COATED ORAL EVERY 6 HOURS PRN
Qty: 15 TABLET | Refills: 0 | Status: SHIPPED | OUTPATIENT
Start: 2023-11-16 | End: 2023-12-01

## 2023-11-16 RX ORDER — OXYCODONE HYDROCHLORIDE 5 MG/1
5-10 TABLET ORAL EVERY 6 HOURS PRN
Qty: 28 TABLET | Refills: 0 | Status: SHIPPED | OUTPATIENT
Start: 2023-11-16 | End: 2023-11-23

## 2023-11-16 RX ORDER — AMOXICILLIN AND CLAVULANATE POTASSIUM 875; 125 MG/1; MG/1
1 TABLET, FILM COATED ORAL 2 TIMES DAILY
Qty: 14 TABLET | Refills: 0 | Status: SHIPPED | OUTPATIENT
Start: 2023-11-16 | End: 2023-11-23

## 2023-11-16 RX ADMIN — OXYCODONE HYDROCHLORIDE 10 MG: 5 TABLET ORAL at 08:06

## 2023-11-16 RX ADMIN — PIPERACILLIN AND TAZOBACTAM 4500 MG: 4; .5 INJECTION, POWDER, LYOPHILIZED, FOR SOLUTION INTRAVENOUS at 00:31

## 2023-11-16 RX ADMIN — LISINOPRIL 40 MG: 40 TABLET ORAL at 09:39

## 2023-11-16 RX ADMIN — SERTRALINE 150 MG: 100 TABLET, FILM COATED ORAL at 09:38

## 2023-11-16 RX ADMIN — BUPROPION HYDROCHLORIDE 150 MG: 150 TABLET, EXTENDED RELEASE ORAL at 09:39

## 2023-11-16 RX ADMIN — ATENOLOL 100 MG: 100 TABLET ORAL at 09:41

## 2023-11-16 RX ADMIN — FUROSEMIDE 20 MG: 20 TABLET ORAL at 09:40

## 2023-11-16 RX ADMIN — PIPERACILLIN AND TAZOBACTAM 4500 MG: 4; .5 INJECTION, POWDER, LYOPHILIZED, FOR SOLUTION INTRAVENOUS at 10:16

## 2023-11-16 RX ADMIN — KETOROLAC TROMETHAMINE 30 MG: 30 INJECTION, SOLUTION INTRAMUSCULAR; INTRAVENOUS at 03:28

## 2023-11-16 RX ADMIN — FAMOTIDINE 20 MG: 20 TABLET ORAL at 09:40

## 2023-11-16 RX ADMIN — PANTOPRAZOLE SODIUM 40 MG: 40 INJECTION, POWDER, FOR SOLUTION INTRAVENOUS at 09:43

## 2023-11-16 RX ADMIN — RISPERIDONE 0.5 MG: 0.25 TABLET, FILM COATED ORAL at 09:39

## 2023-11-16 RX ADMIN — KETOROLAC TROMETHAMINE 30 MG: 30 INJECTION, SOLUTION INTRAMUSCULAR; INTRAVENOUS at 09:42

## 2023-11-16 RX ADMIN — OXYCODONE HYDROCHLORIDE 10 MG: 5 TABLET ORAL at 03:29

## 2023-11-16 RX ADMIN — SODIUM CHLORIDE, PRESERVATIVE FREE 10 ML: 5 INJECTION INTRAVENOUS at 09:52

## 2023-11-16 ASSESSMENT — PAIN DESCRIPTION - LOCATION
LOCATION: ABDOMEN

## 2023-11-16 ASSESSMENT — PAIN - FUNCTIONAL ASSESSMENT
PAIN_FUNCTIONAL_ASSESSMENT: ACTIVITIES ARE NOT PREVENTED
PAIN_FUNCTIONAL_ASSESSMENT: ACTIVITIES ARE NOT PREVENTED

## 2023-11-16 ASSESSMENT — PAIN SCALES - GENERAL
PAINLEVEL_OUTOF10: 7
PAINLEVEL_OUTOF10: 7
PAINLEVEL_OUTOF10: 2
PAINLEVEL_OUTOF10: 4

## 2023-11-16 ASSESSMENT — PAIN DESCRIPTION - DESCRIPTORS
DESCRIPTORS: ACHING;DULL
DESCRIPTORS: ACHING;SORE
DESCRIPTORS: ACHING;DISCOMFORT

## 2023-11-16 ASSESSMENT — PAIN DESCRIPTION - ORIENTATION
ORIENTATION: RIGHT;MID
ORIENTATION: MID
ORIENTATION: RIGHT

## 2023-11-16 ASSESSMENT — PAIN SCALES - WONG BAKER: WONGBAKER_NUMERICALRESPONSE: 0

## 2023-11-16 NOTE — DISCHARGE SUMMARY
drowsiness with these medications. You may also experience constipation which can be relieved with stool softners or laxatives. **Pain medication at discharge - use only as prescribed- refills may be available to you at your follow up appointments if needed and warranted. Narcotics should be used for only short term and we highly encouraged our patients to wean off appropriately and use other means for pain such as non pharmacologic measures and over the counter tylenol or ibuprofen if no restrictions apply. We do  know that surgical pain is real and will not hesitate to help eliminate some of your discomfort. However we will not be able to completely make you pain free and it is important to determine what pain level is tolerable for you **  [x]You may resume your daily prescription medication schedule unless otherwise specified. Use Colace and MiraLAX asneeded to prevent constipation. Do not allow yourself to become constipated. Drink at least 64 ounces of liquids per day. WOUND/DRESSING INSTRUCTIONS:  Always ensure you and your care giver clean hands before and after caring for the wound. [] Keep dressing clean and dry for 48 hours. Change when soiled or wet. [x] Allow steri-strips to fall off on their own. [x] Ice operative site for 20 minutes 4 times a day as needed. [x] May wash over incision in shower, but do not soak in a bath.  [] Take sitz bath for 20 minutes twice daily and after bowel movements. [x] Keep the abdominal binder in place during the day. May remove to shower and at night. ABDOMINAL/LAPAROSCOPIC SURGERY  [x]You are encouraged to get up and move around as this helps with circulation, prevents blood clots from forming and speeds up the healing process. Call the office if you develop pain or swelling in your legs. Do not massage sore muscles in the legs. [x]Breath deeply and cough from time to time.  This helps to clear your lungs and helps prevent

## 2023-11-16 NOTE — TELEPHONE ENCOUNTER
Please have the MA note for hospital follow-up    I did change the next appointment to hospital follow-up    Future Appointments   Date Time Provider [address]   11/21/2023  2:00 PM Winsome Butts MD [facility]   12/4/2023 11:45 AM Brenda Anderson, APRN - CNP [facility]   1/2/2024  1:15 PM Michelle Machado MD [facility]

## 2023-11-16 NOTE — PROGRESS NOTES
Head to Toe Assessment    Alert and oriented x 4. Denied any concerns or fears at this time. Does have complaint of right to mid abdominal pain. Rated pain a 7 on a pain scale of 1-10, \"achy\". PERRLA, Pupils are a 3 mm and went down to a 2 mm. Consensual response noted. Upper extremities are pink, warm, dry and intact. Sensation present. Arm drift negative. Skin turgor and capillary refill less than 3 seconds. Hand grasps are strong and equal bilaterally. Lung sounds clear throughout, moderate depth of respirations, chest movement symmetrical, and regular breathing pattern. Apical pulse 60 at a regular rate and rhythm  Abdomen is soft, round, non distended but tender to touch. Bowel sounds active in all 4 quadrants. No masses noted. Lower extremities pink, warm, dry, and intact. Sensation present. No edema noted bilaterally. Dorsalis pedis and Posterior Tibialis pulses strong and equal bilaterally. Posterior aspect of body pink, warm, dry, and intact. No concerns over bony prominences to shoulders, coccyx, and heels. Able to move freely in bed without any restrictions.

## 2023-11-16 NOTE — PLAN OF CARE
Problem: Pain  Goal: Verbalizes/displays adequate comfort level or baseline comfort level  Outcome: Progressing  Flowsheets (Taken 11/13/2023 1915)  Verbalizes/displays adequate comfort level or baseline comfort level:   Encourage patient to monitor pain and request assistance   Assess pain using appropriate pain scale   Administer analgesics based on type and severity of pain and evaluate response   Implement non-pharmacological measures as appropriate and evaluate response    Problem: Safety - Adult  Goal: Free from fall injury  Outcome: Progressing  fall assessment completed. Patient using call light appropriately to call for assistance with ambulation to bathroom. Personal items within reach. Patient is also compliant with use of non-skid slippers. Placed bed in lowest position  Bed alarm turned on    Problem: Skin/Tissue Integrity - Adult  Goal: Skin integrity remains intact  Outcome: Progressing  Flowsheets (Taken 11/13/2023 1915)  Skin Integrity Remains Intact:   Monitor for areas of redness and/or skin breakdown   Assess vascular access sites hourly    Problem: Metabolic/Fluid and Electrolytes - Adult  Goal: Glucose maintained within prescribed range  Outcome: Progressing  Flowsheets (Taken 11/13/2023 1915)  Glucose maintained within prescribed range:   Monitor blood glucose as ordered   Assess for signs and symptoms of hyperglycemia and hypoglycemia   Administer ordered medications to maintain glucose within target range    Problem: Discharge Planning  Goal: Discharge to home or other facility with appropriate resources  Outcome: Progressing  Flowsheets (Taken 11/13/2023 1915)  Discharge to home or other facility with appropriate resources:   Identify barriers to discharge with patient and caregiver   Arrange for needed discharge resources and transportation as appropriate  Note: patient is from home with his wife    Care plan reviewed with patient and wife.   Patient and wife verbalize
Problem: Safety - Adult  Goal: Free from fall injury  11/14/2023 1025 by Ramiro Galvez RN  Outcome: Progressing   Fall assessment completed. Patient using call light appropriately to call for assistance with ambulation to bathroom. Personal items within reach. Patient is also compliant with use of non-skid slippers. Problem: Skin/Tissue Integrity - Adult  Goal: Skin integrity remains intact  11/14/2023 1025 by Ramiro Galvez RN  Outcome: Progressing  Flowsheets (Taken 11/14/2023 0803)  Skin Integrity Remains Intact: Monitor for areas of redness and/or skin breakdown   No skin breakdown this shift. Patient being assisted with turning. Patients states understanding of repositioning every two hours. Problem: Pain  Goal: Verbalizes/displays adequate comfort level or baseline comfort level  11/14/2023 1025 by Ramiro Galvez RN  Outcome: Progressing   Pain Assessment: None - Denies Pain    Is pain goal met at this time?   Yes    Problem: Metabolic/Fluid and Electrolytes - Adult  Goal: Glucose maintained within prescribed range  11/14/2023 1025 by Ramiro Galvez RN  Outcome: Progressing  Flowsheets (Taken 11/14/2023 0803)  Glucose maintained within prescribed range: Monitor blood glucose as ordered     Problem: Discharge Planning  Goal: Discharge to home or other facility with appropriate resources  11/14/2023 1025 by Ramiro Galvez RN  Outcome: Progressing  Flowsheets (Taken 11/14/2023 0803)  Discharge to home or other facility with appropriate resources: Identify barriers to discharge with patient and caregiver     Problem: Chronic Conditions and Co-morbidities  Goal: Patient's chronic conditions and co-morbidity symptoms are monitored and maintained or improved  Outcome: Progressing  Flowsheets (Taken 11/14/2023 0803)  Care Plan - Patient's Chronic Conditions and Co-Morbidity Symptoms are Monitored and Maintained or Improved: Monitor and assess patient's chronic conditions and comorbid symptoms for
Problem: Safety - Adult  Goal: Free from fall injury  11/16/2023 1035 by Angeles Marroquin RN  Outcome: Adequate for Discharge     Problem: Pain  Goal: Verbalizes/displays adequate comfort level or baseline comfort level  11/16/2023 1035 by Angeles Marroquin RN  Outcome: Adequate for Discharge     Problem: Skin/Tissue Integrity - Adult  Goal: Skin integrity remains intact  11/16/2023 1035 by Angeles Marroquin RN  Outcome: Adequate for Discharge     Problem: Skin/Tissue Integrity - Adult  Goal: Incisions, wounds, or drain sites healing without S/S of infection  11/16/2023 1035 by Angeles Marroquin RN  Outcome: Adequate for Discharge     Problem: Metabolic/Fluid and Electrolytes - Adult  Goal: Glucose maintained within prescribed range  11/16/2023 1035 by Angeles Marroquin RN  Outcome: Adequate for Discharge     Problem: Discharge Planning  Goal: Discharge to home or other facility with appropriate resources  11/16/2023 1035 by Angeles Marroquin RN  Outcome: Adequate for Discharge     Problem: Chronic Conditions and Co-morbidities  Goal: Patient's chronic conditions and co-morbidity symptoms are monitored and maintained or improved  11/16/2023 1035 by Angeles Marroquin RN  Outcome: Adequate for Discharge   Care plan reviewed with patient and wife. Patient and wife verbalize understanding of the plan of care and contribute to goal setting.
Problem: Safety - Adult  Goal: Free from fall injury  Outcome: Progressing  Flowsheets (Taken 11/15/2023 3414)  Free From Fall Injury: Instruct family/caregiver on patient safety     Problem: Pain  Goal: Verbalizes/displays adequate comfort level or baseline comfort level  Outcome: Progressing  Flowsheets (Taken 11/13/2023 1915 by Christina Ramos, RN)  Verbalizes/displays adequate comfort level or baseline comfort level:   Encourage patient to monitor pain and request assistance   Assess pain using appropriate pain scale   Administer analgesics based on type and severity of pain and evaluate response   Implement non-pharmacological measures as appropriate and evaluate response     Problem: Skin/Tissue Integrity - Adult  Goal: Skin integrity remains intact  Outcome: Progressing  Flowsheets (Taken 11/15/2023 2015)  Skin Integrity Remains Intact:   Monitor for areas of redness and/or skin breakdown   Assess vascular access sites hourly     Problem: Metabolic/Fluid and Electrolytes - Adult  Goal: Glucose maintained within prescribed range  Outcome: Progressing  Flowsheets (Taken 11/15/2023 2015)  Glucose maintained within prescribed range:   Monitor blood glucose as ordered   Assess for signs and symptoms of hyperglycemia and hypoglycemia   Administer ordered medications to maintain glucose within target range   Assess barriers to adequate nutritional intake and initiate nutrition consult as needed   Instruct patient on self management of diabetes and initiate consult as needed     Problem: Discharge Planning  Goal: Discharge to home or other facility with appropriate resources  Outcome: Progressing  Flowsheets (Taken 11/14/2023 1930 by Christina Ramos, RN)  Discharge to home or other facility with appropriate resources: Identify barriers to discharge with patient and caregiver     Problem: Chronic Conditions and Co-morbidities  Goal: Patient's chronic conditions and co-morbidity symptoms are monitored and maintained
resources and transportation as appropriate  Note: patient is from home with his wife     Care plan reviewed with patient and wife. Patient and wife verbalize understanding of the plan of care and contribute to goal setting.

## 2023-11-16 NOTE — DISCHARGE INSTRUCTIONS
DR. Lenz Boy DISCHARGE INSTRUCTIONS    Pt Name: Jenn Jean  Medical Record Number: [id number]  Today's Date: 11/16/2023    GENERAL ANESTHESIA OR SEDATION  1. Do not drive or operate hazardous machinery for 24 hours. 2. Do not make important business or personal decisions for 24 hours. 3. Do not drink alcoholic beverages or use tobacco for 24 hours. ACTIVITY INSTRUCTIONS:  [] Rest today. Resume light to normal activity tomorrow.   [] You may resume normal activity tomorrow. Do not engage in strenuous activity that may place stress on your incision. [x] Do not drive for 3-5 days or while taking the pain medication. Avoid heavy lifting, tugging, pullings greater than 10 lbs until seen in the office. DIET INSTRUCTIONS:  [x]Begin with clear liquids. If not nauseated, may increase to a low-fat diet when you desire. Greasy and spicy foods are not advised. [x]Regular diet as tolerated. MEDICATIONS  [x]Prescription sent with you to be used as directed. [x]Toradol - anti inflammatory type medication to alternate with Oxy   [x]Oxycontin - narcotic  Do not drink alcohol or drive while taking these medications. You may experience dizziness or drowsiness with these medications. You may also experience constipation which can be relieved with stool softners or laxatives. **Pain medication at discharge - use only as prescribed- refills may be available to you at your follow up appointments if needed and warranted. Narcotics should be used for only short term and we highly encouraged our patients to wean off appropriately and use other means for pain such as non pharmacologic measures and over the counter tylenol or ibuprofen if no restrictions apply. We do  know that surgical pain is real and will not hesitate to help eliminate some of your discomfort.  However we will not be able to completely make you pain free and it is important to determine what pain level is tolerable for you **  [x]You may resume your

## 2023-11-16 NOTE — CARE COORDINATION

## 2023-11-16 NOTE — PROGRESS NOTES
10  [facility], APRN-CNP for Dr. Kaleigh Tang Surgery Daily Progress Note    Pt Name: Laura Nogueira  Medical Record Number: [id number]  Date of Birth 1980   Today's Date: 11/16/2023    Hospital day # 3     ASSESSMENT   Calculus cholecystitis - POD # 2 Status post robotic cholecystectomy   Fatty liver disease  Morbid obesity (BMI 47)  Hypertension  Diabetes Mellitus    has a past medical history of Anxiety, Chronic left-sided low back pain with left-sided sciatica, Depression, Erectile dysfunction, Essential hypertension, Gastroesophageal reflux disease without esophagitis, Headache, Hyperglycemia, Hyperlipidemia, Hypertension, Intermittent explosive disorder in adult, Irritable bowel syndrome with diarrhea, Morbid obesity with BMI of 45.0-49.9, adult ([address]), Other sleep apnea, Palpitations, Peripheral edema, Positive Clay's Sign, RUQ pain, Type 2 diabetes mellitus with hyperglycemia, without long-term current use of insulin ([address]), and Unintended weight loss. PLAN   Increase diet as tolerated  Stool softeners as needed  Toradol   Continue antibiotics  Pain & nausea control   Lovenox for DVT prophylaxis   [place]ms ACHS with SS coverage as needed. Resume home medications. Labs reviewed. WBC much better. Pathology pending   Looks good. Home today. Follow up in office in 2 weeks. SUBJECTIVE   Chief complaint: Incisional pain     Patient was stable overnight. VS stable. No fevers. Chart reviewed. Updated by nursing staff. Denies chest discomfort or dyspnea. No N/V. Passing some flatus but no BM. Tolerating ADULT DIET; Regular; Low Fat (less than or equal to 50 gm/day) diet. Incisional pain controlled with pain medications. Up ad edith. No urinary complaints. Incisions are clean, dry and intact.    CURRENT MEDICATIONS   Scheduled Meds:   ketorolac  30 mg IntraVENous Q6H    enoxaparin  60 mg SubCUTAneous BID    sodium chloride flush  5-40 mL IntraVENous 2 times per day

## 2023-11-17 NOTE — TELEPHONE ENCOUNTER
Noted  Future Appointments   Date Time Provider [address]   11/21/2023  2:00 PM Kayla Hale MD [facility]   12/4/2023 11:45 AM STEPHON Torrez - CNP N Adv Surg [facility]   1/2/2024  1:15 PM Frederico Osler, MD [facility]

## 2023-11-21 ENCOUNTER — TELEMEDICINE (OUTPATIENT)
Dept: FAMILY MEDICINE CLINIC | Age: 43
End: 2023-11-21
Payer: COMMERCIAL

## 2023-11-21 DIAGNOSIS — F33.2 SEVERE EPISODE OF RECURRENT MAJOR DEPRESSIVE DISORDER, WITHOUT PSYCHOTIC FEATURES (HCC): ICD-10-CM

## 2023-11-21 DIAGNOSIS — E78.5 HYPERLIPIDEMIA WITH TARGET LDL LESS THAN 100: ICD-10-CM

## 2023-11-21 DIAGNOSIS — E11.65 TYPE 2 DIABETES MELLITUS WITH HYPERGLYCEMIA, WITHOUT LONG-TERM CURRENT USE OF INSULIN (HCC): ICD-10-CM

## 2023-11-21 DIAGNOSIS — Z09 HOSPITAL DISCHARGE FOLLOW-UP: ICD-10-CM

## 2023-11-21 DIAGNOSIS — Z90.49 STATUS POST LAPAROSCOPIC CHOLECYSTECTOMY: ICD-10-CM

## 2023-11-21 DIAGNOSIS — I10 ESSENTIAL HYPERTENSION: Primary | ICD-10-CM

## 2023-11-21 PROCEDURE — 1111F DSCHRG MED/CURRENT MED MERGE: CPT | Performed by: FAMILY MEDICINE

## 2023-11-21 PROCEDURE — G8427 DOCREV CUR MEDS BY ELIG CLIN: HCPCS | Performed by: FAMILY MEDICINE

## 2023-11-21 PROCEDURE — 3044F HG A1C LEVEL LT 7.0%: CPT | Performed by: FAMILY MEDICINE

## 2023-11-21 PROCEDURE — 99214 OFFICE O/P EST MOD 30 MIN: CPT | Performed by: FAMILY MEDICINE

## 2023-11-21 PROCEDURE — 2022F DILAT RTA XM EVC RTNOPTHY: CPT | Performed by: FAMILY MEDICINE

## 2023-11-21 RX ORDER — LANCETS 33 GAUGE
EACH MISCELLANEOUS
Qty: 100 EACH | Refills: 3 | Status: SHIPPED | OUTPATIENT
Start: 2023-11-21

## 2023-11-21 ASSESSMENT — ENCOUNTER SYMPTOMS
NAUSEA: 0
ABDOMINAL PAIN: 1
SHORTNESS OF BREATH: 0
CONSTIPATION: 0
CHEST TIGHTNESS: 0
ABDOMINAL DISTENTION: 0
VOMITING: 0
COUGH: 0
WHEEZING: 0
DIARRHEA: 0

## 2023-11-21 ASSESSMENT — PATIENT HEALTH QUESTIONNAIRE - PHQ9
7. TROUBLE CONCENTRATING ON THINGS, SUCH AS READING THE NEWSPAPER OR WATCHING TELEVISION: 3
8. MOVING OR SPEAKING SO SLOWLY THAT OTHER PEOPLE COULD HAVE NOTICED. OR THE OPPOSITE, BEING SO FIGETY OR RESTLESS THAT YOU HAVE BEEN MOVING AROUND A LOT MORE THAN USUAL: 3
SUM OF ALL RESPONSES TO PHQ QUESTIONS 1-9: 24
6. FEELING BAD ABOUT YOURSELF - OR THAT YOU ARE A FAILURE OR HAVE LET YOURSELF OR YOUR FAMILY DOWN: 3
4. FEELING TIRED OR HAVING LITTLE ENERGY: 3
2. FEELING DOWN, DEPRESSED OR HOPELESS: 3
SUM OF ALL RESPONSES TO PHQ QUESTIONS 1-9: 24
9. THOUGHTS THAT YOU WOULD BE BETTER OFF DEAD, OR OF HURTING YOURSELF: 0
SUM OF ALL RESPONSES TO PHQ QUESTIONS 1-9: 24
1. LITTLE INTEREST OR PLEASURE IN DOING THINGS: 3
10. IF YOU CHECKED OFF ANY PROBLEMS, HOW DIFFICULT HAVE THESE PROBLEMS MADE IT FOR YOU TO DO YOUR WORK, TAKE CARE OF THINGS AT HOME, OR GET ALONG WITH OTHER PEOPLE: 1
3. TROUBLE FALLING OR STAYING ASLEEP: 3
5. POOR APPETITE OR OVEREATING: 3
SUM OF ALL RESPONSES TO PHQ QUESTIONS 1-9: 24
SUM OF ALL RESPONSES TO PHQ9 QUESTIONS 1 & 2: 6

## 2023-11-21 NOTE — TELEPHONE ENCOUNTER
Please Approve or Refuse.   Send to Pharmacy per Pt's Request: latoya     Next Visit Date:  11/21/2023   Last Visit Date: 8/16/2023    Hemoglobin A1C (%)   Date Value   08/16/2023 5.6   03/11/2022 6.6 (H)   06/16/2021 5.8             ( goal A1C is < 7)   BP Readings from Last 3 Encounters:   11/16/23 134/86   11/13/23 125/79   08/16/23 138/86          (goal 120/80)  BUN   Date Value Ref Range Status   11/16/2023 19 7 - 22 mg/dL Final     Creatinine   Date Value Ref Range Status   11/16/2023 1.1 0.4 - 1.2 mg/dL Final     Potassium   Date Value Ref Range Status   11/16/2023 4.4 3.5 - 5.2 meq/L Final

## 2023-11-24 ENCOUNTER — TELEPHONE (OUTPATIENT)
Dept: FAMILY MEDICINE CLINIC | Age: 43
End: 2023-11-24

## 2023-11-24 NOTE — TELEPHONE ENCOUNTER
Noted  Future Appointments   Date Time Provider [address]   12/4/2023 11:45 AM STEPHON Romano - CNP N Adv Surg [facility]   1/2/2024  1:15 PM Priya Jesus MD [facility]

## 2023-11-24 NOTE — TELEPHONE ENCOUNTER
Return in 3 months (on 2/21/2024) for POC A1c if more than 3 months from prior, PHQ-9, DM2, HTN,HLP, Visit type extended 30 minutes chronic problems.

## 2024-01-02 DIAGNOSIS — K21.9 GASTROESOPHAGEAL REFLUX DISEASE WITHOUT ESOPHAGITIS: ICD-10-CM

## 2024-01-03 RX ORDER — FAMOTIDINE 20 MG/1
TABLET, FILM COATED ORAL
Qty: 60 TABLET | Refills: 3 | Status: SHIPPED | OUTPATIENT
Start: 2024-01-03

## 2024-01-22 DIAGNOSIS — I10 ESSENTIAL HYPERTENSION: ICD-10-CM

## 2024-01-22 DIAGNOSIS — F33.1 MODERATE EPISODE OF RECURRENT MAJOR DEPRESSIVE DISORDER (HCC): ICD-10-CM

## 2024-01-22 RX ORDER — POTASSIUM CHLORIDE 750 MG/1
10 TABLET, EXTENDED RELEASE ORAL DAILY
Qty: 90 TABLET | Refills: 1 | Status: SHIPPED | OUTPATIENT
Start: 2024-01-22

## 2024-01-22 RX ORDER — BUPROPION HYDROCHLORIDE 150 MG/1
150 TABLET, EXTENDED RELEASE ORAL 2 TIMES DAILY
Qty: 90 TABLET | Refills: 1 | Status: SHIPPED | OUTPATIENT
Start: 2024-01-22

## 2024-01-22 RX ORDER — LISINOPRIL 40 MG/1
40 TABLET ORAL DAILY
Qty: 90 TABLET | Refills: 1 | Status: SHIPPED | OUTPATIENT
Start: 2024-01-22

## 2024-01-22 RX ORDER — PRAZOSIN HYDROCHLORIDE 1 MG/1
1 CAPSULE ORAL NIGHTLY
Qty: 90 CAPSULE | Refills: 1 | Status: SHIPPED | OUTPATIENT
Start: 2024-01-22

## 2024-01-22 NOTE — TELEPHONE ENCOUNTER
Please Approve or Refuse.  Send to Pharmacy per Pt's Request:     Fax received from pharmacy requesting 90 day supplies.     Next Visit Date:  Visit date not found   Last Visit Date: 11/21/2023    Hemoglobin A1C (%)   Date Value   08/16/2023 5.6   03/11/2022 6.6 (H)   06/16/2021 5.8             ( goal A1C is < 7)   BP Readings from Last 3 Encounters:   11/16/23 134/86   11/13/23 125/79   08/16/23 138/86          (goal 120/80)  BUN   Date Value Ref Range Status   11/16/2023 19 7 - 22 mg/dL Final     Creatinine   Date Value Ref Range Status   11/16/2023 1.1 0.4 - 1.2 mg/dL Final     Potassium   Date Value Ref Range Status   11/16/2023 4.4 3.5 - 5.2 meq/L Final

## 2024-02-09 DIAGNOSIS — I10 ESSENTIAL HYPERTENSION: ICD-10-CM

## 2024-02-11 DIAGNOSIS — E78.5 HYPERLIPIDEMIA WITH TARGET LDL LESS THAN 100: ICD-10-CM

## 2024-02-12 RX ORDER — ATENOLOL 100 MG/1
TABLET ORAL
Qty: 90 TABLET | Refills: 0 | Status: SHIPPED | OUTPATIENT
Start: 2024-02-12

## 2024-02-12 RX ORDER — ATORVASTATIN CALCIUM 10 MG/1
10 TABLET, FILM COATED ORAL
Qty: 90 TABLET | Refills: 0 | Status: SHIPPED | OUTPATIENT
Start: 2024-02-12

## 2024-02-12 NOTE — TELEPHONE ENCOUNTER
Please Approve or Refuse.  Send to Pharmacy per Pt's Request:      Next Visit Date:  2/11/2024   Last Visit Date: 11/21/2023    Hemoglobin A1C (%)   Date Value   08/16/2023 5.6   03/11/2022 6.6 (H)   06/16/2021 5.8             ( goal A1C is < 7)   BP Readings from Last 3 Encounters:   11/16/23 134/86   11/13/23 125/79   08/16/23 138/86          (goal 120/80)  BUN   Date Value Ref Range Status   11/16/2023 19 7 - 22 mg/dL Final     Creatinine   Date Value Ref Range Status   11/16/2023 1.1 0.4 - 1.2 mg/dL Final     Potassium   Date Value Ref Range Status   11/16/2023 4.4 3.5 - 5.2 meq/L Final

## 2024-02-12 NOTE — TELEPHONE ENCOUNTER
Please Approve or Refuse.  Send to Pharmacy per Pt's Request: latoya     Next Visit Date:  called pt lvm  Last Visit Date: 11/21/2023    Hemoglobin A1C (%)   Date Value   08/16/2023 5.6   03/11/2022 6.6 (H)   06/16/2021 5.8             ( goal A1C is < 7)   BP Readings from Last 3 Encounters:   11/16/23 134/86   11/13/23 125/79   08/16/23 138/86          (goal 120/80)  BUN   Date Value Ref Range Status   11/16/2023 19 7 - 22 mg/dL Final     Creatinine   Date Value Ref Range Status   11/16/2023 1.1 0.4 - 1.2 mg/dL Final     Potassium   Date Value Ref Range Status   11/16/2023 4.4 3.5 - 5.2 meq/L Final

## 2024-03-08 DIAGNOSIS — F33.1 MODERATE EPISODE OF RECURRENT MAJOR DEPRESSIVE DISORDER (HCC): ICD-10-CM

## 2024-03-08 DIAGNOSIS — I10 ESSENTIAL HYPERTENSION: ICD-10-CM

## 2024-03-11 RX ORDER — SERTRALINE HYDROCHLORIDE 100 MG/1
TABLET, FILM COATED ORAL
Qty: 45 TABLET | Refills: 0 | Status: SHIPPED | OUTPATIENT
Start: 2024-03-11

## 2024-03-11 RX ORDER — FUROSEMIDE 20 MG/1
20 TABLET ORAL DAILY
Qty: 90 TABLET | Refills: 0 | Status: SHIPPED | OUTPATIENT
Start: 2024-03-11

## 2024-03-11 RX ORDER — RISPERIDONE 0.5 MG/1
0.5 TABLET ORAL 2 TIMES DAILY
Qty: 60 TABLET | Refills: 0 | Status: SHIPPED | OUTPATIENT
Start: 2024-03-11

## 2024-03-11 NOTE — TELEPHONE ENCOUNTER
Patient has labs overdue from November 2023, to do the labs ASAP, at [facility] lab in his town

## 2024-03-11 NOTE — TELEPHONE ENCOUNTER
Please Approve or Refuse.  Send to Pharmacy per Pt's Request:      Next Visit Date:  4/11/2024   Last Visit Date: 11/21/2023    Hemoglobin A1C (%)   Date Value   08/16/2023 5.6   03/11/2022 6.6 (H)   06/16/2021 5.8             ( goal A1C is < 7)   BP Readings from Last 3 Encounters:   11/16/23 134/86   11/13/23 125/79   08/16/23 138/86          (goal 120/80)  BUN   Date Value Ref Range Status   11/16/2023 19 7 - 22 mg/dL Final     Creatinine   Date Value Ref Range Status   11/16/2023 1.1 0.4 - 1.2 mg/dL Final     Potassium   Date Value Ref Range Status   11/16/2023 4.4 3.5 - 5.2 meq/L Final

## 2024-03-17 ENCOUNTER — APPOINTMENT (OUTPATIENT)
Dept: GENERAL RADIOLOGY | Age: 44
End: 2024-03-17
Payer: COMMERCIAL

## 2024-03-17 ENCOUNTER — HOSPITAL ENCOUNTER (EMERGENCY)
Age: 44
Discharge: HOME | End: 2024-03-17
Payer: COMMERCIAL

## 2024-03-17 VITALS
HEART RATE: 73 BPM | OXYGEN SATURATION: 93 % | TEMPERATURE: 98.5 F | WEIGHT: 315 LBS | SYSTOLIC BLOOD PRESSURE: 112 MMHG | RESPIRATION RATE: 18 BRPM | DIASTOLIC BLOOD PRESSURE: 76 MMHG

## 2024-03-17 DIAGNOSIS — S83.91XA SPRAIN OF RIGHT KNEE, UNSPECIFIED LIGAMENT, INITIAL ENCOUNTER: Primary | ICD-10-CM

## 2024-03-17 PROCEDURE — 73562 X-RAY EXAM OF KNEE 3: CPT

## 2024-03-17 PROCEDURE — 99283 EMERGENCY DEPT VISIT LOW MDM: CPT

## 2024-03-17 RX ORDER — FUROSEMIDE 20 MG/1
20 TABLET ORAL 2 TIMES DAILY
COMMUNITY

## 2024-03-17 RX ORDER — PRAZOSIN HYDROCHLORIDE 1 MG/1
1 CAPSULE ORAL NIGHTLY
COMMUNITY

## 2024-03-17 RX ORDER — FAMOTIDINE 20 MG/1
20 TABLET, FILM COATED ORAL 2 TIMES DAILY
COMMUNITY

## 2024-03-17 RX ORDER — POTASSIUM CHLORIDE 750 MG/1
10 CAPSULE, EXTENDED RELEASE ORAL DAILY
COMMUNITY

## 2024-03-17 RX ORDER — ATENOLOL 100 MG/1
100 TABLET ORAL DAILY
COMMUNITY

## 2024-03-17 RX ORDER — RISPERIDONE 0.5 MG/1
0.5 TABLET ORAL 2 TIMES DAILY
COMMUNITY

## 2024-03-17 RX ORDER — BUPROPION HYDROCHLORIDE 150 MG/1
150 TABLET ORAL 2 TIMES DAILY
COMMUNITY

## 2024-03-17 RX ORDER — ATORVASTATIN CALCIUM 10 MG/1
10 TABLET, FILM COATED ORAL DAILY
COMMUNITY

## 2024-03-17 RX ORDER — LISINOPRIL 40 MG/1
40 TABLET ORAL DAILY
COMMUNITY

## 2024-03-17 RX ORDER — SERTRALINE HYDROCHLORIDE 100 MG/1
150 TABLET, FILM COATED ORAL DAILY
COMMUNITY

## 2024-03-17 ASSESSMENT — PAIN DESCRIPTION - LOCATION: LOCATION: KNEE

## 2024-03-17 ASSESSMENT — LIFESTYLE VARIABLES
HOW OFTEN DO YOU HAVE A DRINK CONTAINING ALCOHOL: NEVER
HOW MANY STANDARD DRINKS CONTAINING ALCOHOL DO YOU HAVE ON A TYPICAL DAY: PATIENT DOES NOT DRINK

## 2024-03-17 ASSESSMENT — PAIN SCALES - GENERAL: PAINLEVEL_OUTOF10: 7

## 2024-03-17 ASSESSMENT — PAIN - FUNCTIONAL ASSESSMENT: PAIN_FUNCTIONAL_ASSESSMENT: 0-10

## 2024-03-17 ASSESSMENT — PAIN DESCRIPTION - ORIENTATION: ORIENTATION: RIGHT

## 2024-03-18 ENCOUNTER — TELEPHONE (OUTPATIENT)
Dept: FAMILY MEDICINE CLINIC | Age: 44
End: 2024-03-18

## 2024-03-18 NOTE — DISCHARGE INSTRUCTIONS
Ice and elevate righ tknee 20 minutes every 2 hours while awake.  ARNICARE GEL with ice 4 times daily to right knee  Goetz Wrap as I dempnstrated for youe  Weight  bearing as tolerate.

## 2024-03-18 NOTE — ED PROVIDER NOTES
signs of injury present.      Cervical back: Normal range of motion and neck supple. No rigidity or tenderness.      Right lower leg: No edema.      Left lower leg: No edema.      Comments: Right medial distal anterior knee pain.Pain deep middle anterior knee. Positive Lachman. Negative Drawer Sign.Able to straight leg raise rightleg.PPP2+ No ecchymosis. No open lesion.    Skin:     General: Skin is warm and dry.      Capillary Refill: Capillary refill takes less than 2 seconds.      Coloration: Skin is not jaundiced or pale.      Findings: No bruising, erythema, lesion or rash.   Neurological:      General: No focal deficit present.      Mental Status: He is alert and oriented to person, place, and time.      Sensory: No sensory deficit.   Psychiatric:         Mood and Affect: Mood normal.         DIAGNOSTIC RESULTS     EKG: All EKG's are interpreted by the Emergency Department Physician who either signs or Co-signs this chart in the absence of a cardiologist.        RADIOLOGY:   Non-plain film images such as CT, Ultrasound and MRI are read by the radiologist. Plain radiographic images are visualized and preliminarily interpreted by the emergency physician with the below findings:        Interpretation per the Radiologist below, if available at the time of this note:    XR KNEE RIGHT (3 VIEWS)   Final Result   No fracture or dislocation.               ED BEDSIDE ULTRASOUND:   Performed by ED Physician - none    LABS:  Labs Reviewed - No data to display    All other labs were within normal range or not returned as of this dictation.    EMERGENCY DEPARTMENT COURSE and DIFFERENTIAL DIAGNOSIS/MDM:   Vitals:    Vitals:    03/17/24 1957 03/17/24 1958 03/17/24 2003   BP:   112/76   Pulse: 73     Resp: 18     Temp: 98.5 °F (36.9 °C)     TempSrc: Tympanic     SpO2:  93%    Weight:  (!) 204.1 kg (450 lb)            Medical Decision Making  Xrays results reviewed with pt. Modified Goetz wrap placed with improvement of

## 2024-03-18 NOTE — TELEPHONE ENCOUNTER
[facility] ED Follow up Call    Reason for ED visit:   Sprain of right knee     3/18/2024         FU appts/Provider:    Future Appointments   Date Time Provider Department Center   4/11/2024  9:30 AM Analia Krueger MD fp sc [facility]   4/30/2024  1:15 PM Jerry Obrien MD [facility]         VOICEMAIL DOCUMENTATION - ERASE IF NOT USED  Hi, this message is for Chuck.  This is Candice Grimm MA from Analia Kirk office.  Just calling to see how you are doing after your recent visit to the Emergency Room.  Analia Kirk wants to make sure you were able to fill any prescriptions and that you understand your discharge instructions.  Please return our call if you need to make a follow up appointment with your provider or have any further needs.   Our phone number is [phone].  Have a great day.

## 2024-04-07 ENCOUNTER — HOSPITAL ENCOUNTER (EMERGENCY)
Age: 44
Discharge: HOME | End: 2024-04-07
Payer: COMMERCIAL

## 2024-04-07 ENCOUNTER — APPOINTMENT (OUTPATIENT)
Dept: GENERAL RADIOLOGY | Age: 44
End: 2024-04-07
Payer: COMMERCIAL

## 2024-04-07 VITALS
TEMPERATURE: 97.5 F | OXYGEN SATURATION: 97 % | RESPIRATION RATE: 16 BRPM | BODY MASS INDEX: 48.1 KG/M2 | SYSTOLIC BLOOD PRESSURE: 118 MMHG | DIASTOLIC BLOOD PRESSURE: 58 MMHG | HEART RATE: 61 BPM | WEIGHT: 315 LBS

## 2024-04-07 DIAGNOSIS — M10.072 ACUTE IDIOPATHIC GOUT OF LEFT FOOT: Primary | ICD-10-CM

## 2024-04-07 LAB
ANION GAP SERPL CALCULATED.3IONS-SCNC: 11 MMOL/L (ref 9–17)
BASOPHILS # BLD: 0.08 K/UL (ref 0–0.2)
BASOPHILS NFR BLD: 1 % (ref 0–2)
BUN SERPL-MCNC: 15 MG/DL (ref 6–20)
BUN/CREAT SERPL: 17 (ref 9–20)
CALCIUM SERPL-MCNC: 9.3 MG/DL (ref 8.6–10.4)
CHLORIDE SERPL-SCNC: 99 MMOL/L (ref 98–107)
CO2 SERPL-SCNC: 26 MMOL/L (ref 20–31)
CREAT SERPL-MCNC: 0.9 MG/DL (ref 0.7–1.2)
CRP SERPL HS-MCNC: 14.6 MG/L (ref 0–5)
EOSINOPHIL # BLD: 0.11 K/UL (ref 0–0.44)
EOSINOPHILS RELATIVE PERCENT: 1 % (ref 1–4)
ERYTHROCYTE [DISTWIDTH] IN BLOOD BY AUTOMATED COUNT: 14 % (ref 11.8–14.4)
ERYTHROCYTE [SEDIMENTATION RATE] IN BLOOD BY PHOTOMETRIC METHOD: 37 MM/HR (ref 0–15)
GFR SERPL CREATININE-BSD FRML MDRD: >90 ML/MIN/1.73M2
GLUCOSE SERPL-MCNC: 166 MG/DL (ref 70–99)
HCT VFR BLD AUTO: 46.8 % (ref 40.7–50.3)
HGB BLD-MCNC: 15.6 G/DL (ref 13–17)
IMM GRANULOCYTES # BLD AUTO: 0.05 K/UL (ref 0–0.3)
IMM GRANULOCYTES NFR BLD: 0 %
LYMPHOCYTES NFR BLD: 2.08 K/UL (ref 1.1–3.7)
LYMPHOCYTES RELATIVE PERCENT: 17 % (ref 24–43)
MCH RBC QN AUTO: 28.6 PG (ref 25.2–33.5)
MCHC RBC AUTO-ENTMCNC: 33.3 G/DL (ref 28.4–34.8)
MCV RBC AUTO: 85.9 FL (ref 82.6–102.9)
MONOCYTES NFR BLD: 0.87 K/UL (ref 0.1–1.2)
MONOCYTES NFR BLD: 7 % (ref 3–12)
NEUTROPHILS NFR BLD: 74 % (ref 36–65)
NEUTS SEG NFR BLD: 9.13 K/UL (ref 1.5–8.1)
NRBC BLD-RTO: 0 PER 100 WBC
PLATELET # BLD AUTO: 240 K/UL (ref 138–453)
PMV BLD AUTO: 10.7 FL (ref 8.1–13.5)
POTASSIUM SERPL-SCNC: 4.6 MMOL/L (ref 3.7–5.3)
RBC # BLD AUTO: 5.45 M/UL (ref 4.21–5.77)
SODIUM SERPL-SCNC: 136 MMOL/L (ref 135–144)
URATE SERPL-MCNC: 7.1 MG/DL (ref 3.4–7)
WBC OTHER # BLD: 12.3 K/UL (ref 3.5–11.3)

## 2024-04-07 PROCEDURE — 99284 EMERGENCY DEPT VISIT MOD MDM: CPT

## 2024-04-07 PROCEDURE — 6370000000 HC RX 637 (ALT 250 FOR IP): Performed by: PHYSICIAN ASSISTANT

## 2024-04-07 PROCEDURE — 86140 C-REACTIVE PROTEIN: CPT

## 2024-04-07 PROCEDURE — 80048 BASIC METABOLIC PNL TOTAL CA: CPT

## 2024-04-07 PROCEDURE — 84550 ASSAY OF BLOOD/URIC ACID: CPT

## 2024-04-07 PROCEDURE — 73630 X-RAY EXAM OF FOOT: CPT

## 2024-04-07 PROCEDURE — 85652 RBC SED RATE AUTOMATED: CPT

## 2024-04-07 PROCEDURE — 85025 COMPLETE CBC W/AUTO DIFF WBC: CPT

## 2024-04-07 RX ORDER — COLCHICINE 0.6 MG/1
0.6 TABLET ORAL DAILY
Status: DISCONTINUED | OUTPATIENT
Start: 2024-04-07 | End: 2024-04-07

## 2024-04-07 RX ORDER — INDOMETHACIN 25 MG/1
25 CAPSULE ORAL
Status: DISCONTINUED | OUTPATIENT
Start: 2024-04-07 | End: 2024-04-07

## 2024-04-07 RX ORDER — INDOMETHACIN 25 MG/1
50 CAPSULE ORAL
Status: DISCONTINUED | OUTPATIENT
Start: 2024-04-07 | End: 2024-04-07 | Stop reason: HOSPADM

## 2024-04-07 RX ORDER — COLCHICINE 0.6 MG/1
1.2 TABLET ORAL DAILY
Status: DISCONTINUED | OUTPATIENT
Start: 2024-04-07 | End: 2024-04-07 | Stop reason: HOSPADM

## 2024-04-07 RX ORDER — INDOMETHACIN 50 MG/1
50 CAPSULE ORAL
Qty: 30 CAPSULE | Refills: 0 | Status: SHIPPED | OUTPATIENT
Start: 2024-04-07

## 2024-04-07 RX ORDER — COLCHICINE 0.6 MG/1
0.6 TABLET ORAL 2 TIMES DAILY
Status: DISCONTINUED | OUTPATIENT
Start: 2024-04-08 | End: 2024-04-07

## 2024-04-07 RX ORDER — COLCHICINE 0.6 MG/1
TABLET ORAL
Qty: 30 TABLET | Refills: 0 | Status: SHIPPED | OUTPATIENT
Start: 2024-04-07

## 2024-04-07 RX ORDER — COLCHICINE 0.6 MG/1
0.6 TABLET ORAL 2 TIMES DAILY
Status: DISCONTINUED | OUTPATIENT
Start: 2024-04-07 | End: 2024-04-07 | Stop reason: HOSPADM

## 2024-04-07 RX ADMIN — INDOMETHACIN 50 MG: 25 CAPSULE ORAL at 17:38

## 2024-04-07 RX ADMIN — COLCHICINE 1.2 MG: 0.6 TABLET ORAL at 17:38

## 2024-04-07 RX ADMIN — COLCHICINE 0.6 MG: 0.6 TABLET, FILM COATED ORAL at 17:49

## 2024-04-07 ASSESSMENT — PAIN DESCRIPTION - ORIENTATION: ORIENTATION: LEFT;LOWER

## 2024-04-07 ASSESSMENT — PAIN SCALES - GENERAL: PAINLEVEL_OUTOF10: 5

## 2024-04-07 ASSESSMENT — LIFESTYLE VARIABLES
HOW MANY STANDARD DRINKS CONTAINING ALCOHOL DO YOU HAVE ON A TYPICAL DAY: PATIENT DOES NOT DRINK
HOW OFTEN DO YOU HAVE A DRINK CONTAINING ALCOHOL: NEVER

## 2024-04-07 ASSESSMENT — PAIN DESCRIPTION - LOCATION: LOCATION: FOOT;LEG

## 2024-04-07 ASSESSMENT — PAIN - FUNCTIONAL ASSESSMENT: PAIN_FUNCTIONAL_ASSESSMENT: 0-10

## 2024-04-07 NOTE — ED PROVIDER NOTES
[facility] ED  EMERGENCY DEPARTMENT ENCOUNTER      Pt Name: Chuck Carter  MRN: [id number]  Birthdate 1980  Date of evaluation: 4/7/2024  Provider: Danny Calhoun PA-C  5:38 PM    CHIEF COMPLAINT       Chief Complaint   Patient presents with    Foot Pain     Redness, swelling, pain to left foot and lower leg x3 days. Worse with bearing weight. No known injury.          HISTORY OF PRESENT ILLNESS    Chuck Carter is a 43 y.o. male who presents to the emergency department with complaints of several days of left lateral foot and heel pain.  No history of injury, patient states the foot/lower ankle has been bothering him for few days now specific injury.  Denies any history of gout.  States that he had prior history of being on medication for type 2 diabetes but it was stopped because his A1c was well-controlled.  He is wearing a pair of croc shoes today.  Ambulate with somewhat of a limp from discomfort.  Denies any fever or chills associated with his foot pain he has no warmth to the ankle itself.  Patient does take diuretic which may cause gout.     HPI    Nursing Notes were reviewed.    REVIEW OF SYSTEMS       Review of Systems    Except as noted above the remainder of the review of systems was reviewed and negative.       PAST MEDICAL HISTORY     Past Medical History:   Diagnosis Date    Anxiety     Chronic left-sided low back pain with left-sided sciatica 11/14/2018    Depression     Erectile dysfunction 5/27/2021    Essential hypertension 7/1/2015    Gastroesophageal reflux disease without esophagitis 10/24/2018    Headache     Hyperglycemia 10/10/2017    Hyperlipidemia     Hypertension     Intermittent explosive disorder in adult 10/24/2017    Irritable bowel syndrome with diarrhea 5/27/2021    Morbid obesity with BMI of 45.0-49.9, adult ([facility]) 10/10/2017    Other sleep apnea     Palpitations 4/5/2018    Peripheral edema 3/9/2020    Positive Clay's Sign 10/10/2017    RUQ pain 10/10/2017    Type 2

## 2024-04-09 ENCOUNTER — TELEPHONE (OUTPATIENT)
Dept: FAMILY MEDICINE CLINIC | Age: 44
End: 2024-04-09

## 2024-04-09 NOTE — TELEPHONE ENCOUNTER
[facility] ED Follow up Call    Reason for ED visit:     Foot Pain   4/9/2024     FU appts/Provider:    Future Appointments   Date Time Provider Department Center   4/11/2024  9:30 AM Analia Krueger MD fp sc [facility]   4/30/2024  1:15 PM Jerry Obrien MD [facility]         VOICEMAIL DOCUMENTATION - ERASE IF NOT USED  Hi, this message is for Chuck.  This is MAIRA  from Analia Kirk office.  Just calling to see how you are doing after your recent visit to the Emergency Room.  Analia Kirk wants to make sure you were able to fill any prescriptions and that you understand your discharge instructions.  Please return our call if you need to make a follow up appointment with your provider or have any further needs.   Our phone number is [phone].  Have a great day.

## 2024-04-10 NOTE — TELEPHONE ENCOUNTER
If the appointment tomorrow is video, please change the type of the appointment for tomorrow.      Future Appointments   Date Time Provider Department Center   4/11/2024  9:30 AM Analia Krueger MD fp [facility]   4/30/2024  1:15 PM Jerry Obrien MD [facility]   7/11/2024  8:00 AM Analia Krueger MD [facility]

## 2024-04-10 NOTE — TELEPHONE ENCOUNTER
Called pt to confirm appt and pt states he doesn't have transportation and needed to cancel appt. Writer offered next available with provider as well as offered sooner appt with CNP pt declined to see CNP. Pt then mentioned he was in the ED. Pt is willing to do a VV.    Please advised if okay to switch pts appt to a VV at 9:30 am.    [facility] ED Follow up Call    Reason for ED visit:  Acute idiopathic gout of left foot        Did you receive discharge instructions?  Yes  Do you understand the discharge instructions? Yes  Did the ED give you any new prescriptions? Yes  Were you able to fill your prescriptions? Yes      Do you have one of our red, yellow and green  Zone sheets that help you to determine when you should go to the ED?Not Applicable      Do you need or want to make a follow up appt with your PCP?Yes, declined to schedule with CNP    Do you have any further needs in the home i.e. Equipment?  No        FU appts/Provider:    Future Appointments   Date Time Provider Department Center   4/30/2024  1:15 PM Jerry Obrien MD [facility]   7/11/2024  8:00 AM Analia Krueger MD fp sc [facility]

## 2024-04-11 ENCOUNTER — TELEPHONE (OUTPATIENT)
Dept: FAMILY MEDICINE CLINIC | Age: 44
End: 2024-04-11

## 2024-04-11 NOTE — TELEPHONE ENCOUNTER
Chuck Carter     Patient  Mobile Phone  Send Link Via Text  Last text sent04/11/2024 9:56 AM  To:[phone]  Email  Send Link Via Email  Last email sent04/11/2024 9:56 AM  To:[email or web address]     Left voice message 10:04 AM not available, I left him a long message, to click on the link in the next 30 minutes if not he will need to reschedule.  My plan is also to refer him to a new PCP in [place]

## 2024-04-11 NOTE — TELEPHONE ENCOUNTER
Please check with patient, I want to refer him to a family doctor and really good 1 in [place], can I place the referral?  Future Appointments   Date Time Provider Department Center   4/30/2024  1:15 PM Jerry Obrien MD AFL [facility]   7/11/2024  8:00 AM Analia rKueger MD [facility]

## 2024-05-14 DIAGNOSIS — I10 ESSENTIAL HYPERTENSION: ICD-10-CM

## 2024-05-14 RX ORDER — ATENOLOL 100 MG/1
TABLET ORAL
Qty: 90 TABLET | Refills: 3 | Status: SHIPPED | OUTPATIENT
Start: 2024-05-14

## 2024-05-14 NOTE — TELEPHONE ENCOUNTER
Please Approve or Refuse.  Send to Pharmacy per Pt's Request:      Next Visit Date:  7/11/2024   Last Visit Date: 11/21/2023    Hemoglobin A1C (%)   Date Value   08/16/2023 5.6   03/11/2022 6.6 (H)   06/16/2021 5.8             ( goal A1C is < 7)   BP Readings from Last 3 Encounters:   04/07/24 (!) 118/58   03/17/24 112/76   11/16/23 134/86          (goal 120/80)  BUN   Date Value Ref Range Status   04/07/2024 15 6 - 20 mg/dL Final     Creatinine   Date Value Ref Range Status   04/07/2024 0.9 0.7 - 1.2 mg/dL Final     Potassium   Date Value Ref Range Status   04/07/2024 4.6 3.7 - 5.3 mmol/L Final

## 2024-05-17 DIAGNOSIS — E78.5 HYPERLIPIDEMIA WITH TARGET LDL LESS THAN 100: ICD-10-CM

## 2024-05-17 RX ORDER — ATORVASTATIN CALCIUM 10 MG/1
10 TABLET, FILM COATED ORAL DAILY
Qty: 90 TABLET | Refills: 0 | Status: SHIPPED | OUTPATIENT
Start: 2024-05-17

## 2024-06-06 DIAGNOSIS — I10 ESSENTIAL HYPERTENSION: ICD-10-CM

## 2024-06-06 RX ORDER — FUROSEMIDE 20 MG/1
20 TABLET ORAL DAILY
Qty: 90 TABLET | Refills: 0 | Status: SHIPPED | OUTPATIENT
Start: 2024-06-06

## 2024-06-19 DIAGNOSIS — F33.1 MODERATE EPISODE OF RECURRENT MAJOR DEPRESSIVE DISORDER (HCC): ICD-10-CM

## 2024-06-19 DIAGNOSIS — I10 ESSENTIAL HYPERTENSION: ICD-10-CM

## 2024-06-19 DIAGNOSIS — E11.65 TYPE 2 DIABETES MELLITUS WITH HYPERGLYCEMIA, WITHOUT LONG-TERM CURRENT USE OF INSULIN (HCC): ICD-10-CM

## 2024-06-19 DIAGNOSIS — K21.9 GASTROESOPHAGEAL REFLUX DISEASE WITHOUT ESOPHAGITIS: ICD-10-CM

## 2024-06-19 DIAGNOSIS — E78.5 HYPERLIPIDEMIA WITH TARGET LDL LESS THAN 100: ICD-10-CM

## 2024-06-20 DIAGNOSIS — F33.1 MODERATE EPISODE OF RECURRENT MAJOR DEPRESSIVE DISORDER (HCC): ICD-10-CM

## 2024-06-20 RX ORDER — FAMOTIDINE 20 MG/1
20 TABLET, FILM COATED ORAL 2 TIMES DAILY
Qty: 180 TABLET | Refills: 3 | Status: SHIPPED | OUTPATIENT
Start: 2024-06-20

## 2024-06-20 RX ORDER — SERTRALINE HYDROCHLORIDE 100 MG/1
TABLET, FILM COATED ORAL
Qty: 45 TABLET | OUTPATIENT
Start: 2024-06-20

## 2024-06-20 RX ORDER — ATORVASTATIN CALCIUM 10 MG/1
10 TABLET, FILM COATED ORAL DAILY
Qty: 90 TABLET | Refills: 0 | Status: SHIPPED | OUTPATIENT
Start: 2024-06-20

## 2024-06-20 RX ORDER — ATENOLOL 100 MG/1
TABLET ORAL
Qty: 90 TABLET | Refills: 3 | Status: SHIPPED | OUTPATIENT
Start: 2024-06-20

## 2024-06-20 RX ORDER — FUROSEMIDE 20 MG/1
20 TABLET ORAL EVERY MORNING
Qty: 90 TABLET | Refills: 3 | Status: SHIPPED | OUTPATIENT
Start: 2024-06-20

## 2024-06-20 RX ORDER — LISINOPRIL 40 MG/1
40 TABLET ORAL DAILY
Qty: 90 TABLET | Refills: 3 | Status: SHIPPED | OUTPATIENT
Start: 2024-06-20

## 2024-06-20 RX ORDER — POTASSIUM CHLORIDE 750 MG/1
10 TABLET, EXTENDED RELEASE ORAL DAILY
Qty: 90 TABLET | Refills: 3 | Status: SHIPPED | OUTPATIENT
Start: 2024-06-20

## 2024-06-20 RX ORDER — PEN NEEDLE, DIABETIC 31 GX5/16"
NEEDLE, DISPOSABLE MISCELLANEOUS
Qty: 100 EACH | Refills: 3 | Status: SHIPPED | OUTPATIENT
Start: 2024-06-20

## 2024-06-20 RX ORDER — BUPROPION HYDROCHLORIDE 150 MG/1
150 TABLET, EXTENDED RELEASE ORAL 2 TIMES DAILY
Qty: 90 TABLET | Refills: 3 | Status: SHIPPED | OUTPATIENT
Start: 2024-06-20

## 2024-06-20 RX ORDER — RISPERIDONE 0.5 MG/1
0.5 TABLET ORAL 2 TIMES DAILY
Qty: 180 TABLET | Refills: 0 | Status: SHIPPED | OUTPATIENT
Start: 2024-06-20

## 2024-06-20 RX ORDER — SERTRALINE HYDROCHLORIDE 100 MG/1
150 TABLET, FILM COATED ORAL DAILY
Qty: 135 TABLET | Refills: 0 | Status: SHIPPED | OUTPATIENT
Start: 2024-06-20 | End: 2024-09-18

## 2024-06-20 RX ORDER — GLUCOSAMINE HCL/CHONDROITIN SU 500-400 MG
CAPSULE ORAL
Qty: 100 STRIP | Refills: 3 | Status: SHIPPED | OUTPATIENT
Start: 2024-06-20

## 2024-06-20 NOTE — TELEPHONE ENCOUNTER
Please Approve or Refuse.  Send to Pharmacy per Pt's Request: ruthie      Next Visit Date:  7/11/2024   Last Visit Date: 11/21/2023    Hemoglobin A1C (%)   Date Value   08/16/2023 5.6   03/11/2022 6.6 (H)   06/16/2021 5.8             ( goal A1C is < 7)   BP Readings from Last 3 Encounters:   04/07/24 (!) 118/58   03/17/24 112/76   11/16/23 134/86          (goal 120/80)  BUN   Date Value Ref Range Status   04/07/2024 15 6 - 20 mg/dL Final     Creatinine   Date Value Ref Range Status   04/07/2024 0.9 0.7 - 1.2 mg/dL Final     Potassium   Date Value Ref Range Status   04/07/2024 4.6 3.7 - 5.3 mmol/L Final

## 2024-06-20 NOTE — TELEPHONE ENCOUNTER
Please Approve or Refuse.  Send to Pharmacy per Pt's Request: LV      Next Visit Date:  7/11/2024   Last Visit Date: 11/21/2023    Hemoglobin A1C (%)   Date Value   08/16/2023 5.6   03/11/2022 6.6 (H)   06/16/2021 5.8             ( goal A1C is < 7)   BP Readings from Last 3 Encounters:   04/07/24 (!) 118/58   03/17/24 112/76   11/16/23 134/86          (goal 120/80)  BUN   Date Value Ref Range Status   04/07/2024 15 6 - 20 mg/dL Final     Creatinine   Date Value Ref Range Status   04/07/2024 0.9 0.7 - 1.2 mg/dL Final     Potassium   Date Value Ref Range Status   04/07/2024 4.6 3.7 - 5.3 mmol/L Final

## 2024-08-01 ENCOUNTER — APPOINTMENT (OUTPATIENT)
Dept: GENERAL RADIOLOGY | Age: 44
End: 2024-08-01
Payer: COMMERCIAL

## 2024-08-01 ENCOUNTER — HOSPITAL ENCOUNTER (EMERGENCY)
Age: 44
Discharge: HOME | End: 2024-08-01
Payer: COMMERCIAL

## 2024-08-01 VITALS
DIASTOLIC BLOOD PRESSURE: 95 MMHG | SYSTOLIC BLOOD PRESSURE: 150 MMHG | TEMPERATURE: 98.1 F | RESPIRATION RATE: 20 BRPM | HEIGHT: 78 IN | BODY MASS INDEX: 48.1 KG/M2 | OXYGEN SATURATION: 96 % | HEART RATE: 70 BPM

## 2024-08-01 DIAGNOSIS — S20.212A CONTUSION OF RIB ON LEFT SIDE, INITIAL ENCOUNTER: Primary | ICD-10-CM

## 2024-08-01 DIAGNOSIS — S93.402A SPRAIN OF LEFT ANKLE, UNSPECIFIED LIGAMENT, INITIAL ENCOUNTER: ICD-10-CM

## 2024-08-01 PROCEDURE — 73610 X-RAY EXAM OF ANKLE: CPT

## 2024-08-01 PROCEDURE — 99283 EMERGENCY DEPT VISIT LOW MDM: CPT

## 2024-08-01 PROCEDURE — 71101 X-RAY EXAM UNILAT RIBS/CHEST: CPT

## 2024-08-01 PROCEDURE — 6370000000 HC RX 637 (ALT 250 FOR IP): Performed by: PHYSICIAN ASSISTANT

## 2024-08-01 RX ORDER — HYDROCODONE BITARTRATE AND ACETAMINOPHEN 5; 325 MG/1; MG/1
1 TABLET ORAL EVERY 4 HOURS PRN
Qty: 6 TABLET | Refills: 0 | Status: SHIPPED | OUTPATIENT
Start: 2024-08-01 | End: 2024-08-04

## 2024-08-01 RX ORDER — HYDROCODONE BITARTRATE AND ACETAMINOPHEN 5; 325 MG/1; MG/1
1 TABLET ORAL ONCE
Status: COMPLETED | OUTPATIENT
Start: 2024-08-01 | End: 2024-08-01

## 2024-08-01 RX ORDER — ACETAMINOPHEN 325 MG/1
650 TABLET ORAL EVERY 6 HOURS PRN
COMMUNITY

## 2024-08-01 RX ADMIN — HYDROCODONE BITARTRATE AND ACETAMINOPHEN 1 TABLET: 5; 325 TABLET ORAL at 19:00

## 2024-08-01 ASSESSMENT — ENCOUNTER SYMPTOMS
ABDOMINAL PAIN: 0
SHORTNESS OF BREATH: 0
BACK PAIN: 0
COUGH: 0

## 2024-08-01 ASSESSMENT — PAIN DESCRIPTION - LOCATION
LOCATION: RIB CAGE
LOCATION: RIB CAGE

## 2024-08-01 ASSESSMENT — PAIN DESCRIPTION - PAIN TYPE: TYPE: ACUTE PAIN

## 2024-08-01 ASSESSMENT — PAIN DESCRIPTION - ORIENTATION
ORIENTATION: LEFT
ORIENTATION: LEFT

## 2024-08-01 ASSESSMENT — LIFESTYLE VARIABLES
HOW OFTEN DO YOU HAVE A DRINK CONTAINING ALCOHOL: 2-4 TIMES A MONTH
HOW MANY STANDARD DRINKS CONTAINING ALCOHOL DO YOU HAVE ON A TYPICAL DAY: 1 OR 2

## 2024-08-01 ASSESSMENT — PAIN - FUNCTIONAL ASSESSMENT: PAIN_FUNCTIONAL_ASSESSMENT: 0-10

## 2024-08-01 ASSESSMENT — PAIN DESCRIPTION - DESCRIPTORS: DESCRIPTORS: ACHING

## 2024-08-01 ASSESSMENT — PAIN SCALES - GENERAL
PAINLEVEL_OUTOF10: 9
PAINLEVEL_OUTOF10: 9

## 2024-08-01 NOTE — ED PROVIDER NOTES
[facility]  EMERGENCY DEPARTMENT ENCOUNTER      Pt Name: Chuck Carter  MRN: [id number]  Birthdate 1980  Date of evaluation: 8/1/2024  Provider: Nikole Mcdonald PA-C    CHIEF COMPLAINT       Chief Complaint   Patient presents with    Fall     Fell at approx 0300, tripped in a hole outside    Knee Pain     right    Ankle Pain     left    Rib Pain (injury)     left         HISTORY OF PRESENT ILLNESS      Chuck Carter is a 43 y.o. male who presents to the emergency department due to fall.  Patient states that earlier today he excellently stepped in a hole and fell.  Patient sustained an abrasion to his right knee.  He has pain in the left ankle and the left side of his ribs from the fall.  He did not hit his head, blackout, lose consciousness.  He has no neck or back pain.  His most severe pain is in the left side of his ribs.  Worse with deep breath and movement.  He has not take any medicine for symptom relief.  There are no other complaints or concerns.        REVIEW OF SYSTEMS       Review of Systems   Constitutional:  Negative for fever.   Respiratory:  Negative for cough and shortness of breath.    Cardiovascular:  Negative for chest pain.   Gastrointestinal:  Negative for abdominal pain.   Musculoskeletal:  Negative for back pain and neck pain.         PAST MEDICAL HISTORY     Past Medical History:   Diagnosis Date    Anxiety     Chronic left-sided low back pain with left-sided sciatica 11/14/2018    Depression     Erectile dysfunction 5/27/2021    Essential hypertension 7/1/2015    Gastroesophageal reflux disease without esophagitis 10/24/2018    Headache     Hyperglycemia 10/10/2017    Hyperlipidemia     Hypertension     Intermittent explosive disorder in adult 10/24/2017    Irritable bowel syndrome with diarrhea 5/27/2021    Morbid obesity with BMI of 45.0-49.9, adult ([facility]) 10/10/2017    Other sleep apnea     Palpitations 4/5/2018    Peripheral edema 3/9/2020    Positive Clay's Sign

## 2024-08-02 NOTE — DISCHARGE INSTRUCTIONS
Elevate and ice your ankle.  Use Tylenol and Motrin as needed for discomfort.  Use pain medicine as needed for severe pain.  Make sure you cough and clear your lungs and use the incentive spirometer for your rib injury.

## 2024-09-09 DIAGNOSIS — F33.1 MODERATE EPISODE OF RECURRENT MAJOR DEPRESSIVE DISORDER (HCC): ICD-10-CM

## 2024-09-09 RX ORDER — PRAZOSIN HYDROCHLORIDE 1 MG/1
1 CAPSULE ORAL NIGHTLY
Qty: 30 CAPSULE | Refills: 3 | Status: SHIPPED | OUTPATIENT
Start: 2024-09-09

## 2024-09-16 DIAGNOSIS — F33.1 MODERATE EPISODE OF RECURRENT MAJOR DEPRESSIVE DISORDER (HCC): ICD-10-CM

## 2024-09-16 RX ORDER — RISPERIDONE 0.5 MG/1
0.5 TABLET ORAL 2 TIMES DAILY
Qty: 180 TABLET | Refills: 0 | Status: SHIPPED | OUTPATIENT
Start: 2024-09-16

## 2024-09-16 RX ORDER — SERTRALINE HYDROCHLORIDE 100 MG/1
TABLET, FILM COATED ORAL
Qty: 135 TABLET | Refills: 0 | Status: SHIPPED | OUTPATIENT
Start: 2024-09-16

## 2024-09-16 NOTE — TELEPHONE ENCOUNTER
Please Approve or Refuse.  Send to Pharmacy per Pt's Request: LV     Next Visit Date:  10/2/2024   Last Visit Date: 11/21/2023    Hemoglobin A1C (%)   Date Value   08/16/2023 5.6   03/11/2022 6.6 (H)   06/16/2021 5.8             ( goal A1C is < 7)   BP Readings from Last 3 Encounters:   08/01/24 (!) 150/95   04/07/24 (!) 118/58   03/17/24 112/76          (goal 120/80)  BUN   Date Value Ref Range Status   04/07/2024 15 6 - 20 mg/dL Final     Creatinine   Date Value Ref Range Status   04/07/2024 0.9 0.7 - 1.2 mg/dL Final     Potassium   Date Value Ref Range Status   04/07/2024 4.6 3.7 - 5.3 mmol/L Final

## 2024-09-16 NOTE — TELEPHONE ENCOUNTER
Patient was supposed to establish with psychiatrist, and taking, did he do so?  He needs to keep upcoming appointment and not to miss it, he missed prior appointment per records

## 2024-11-15 DIAGNOSIS — E78.5 HYPERLIPIDEMIA WITH TARGET LDL LESS THAN 100: ICD-10-CM

## 2024-11-15 RX ORDER — ATORVASTATIN CALCIUM 10 MG/1
10 TABLET, FILM COATED ORAL DAILY
Qty: 90 TABLET | Refills: 0 | Status: SHIPPED | OUTPATIENT
Start: 2024-11-15

## 2024-11-15 NOTE — TELEPHONE ENCOUNTER
Please Approve or Refuse.  Send to Pharmacy per Pt's Request: LV      Next Visit Date:  12/27/2024   Last Visit Date: 11/21/2023    Hemoglobin A1C (%)   Date Value   08/16/2023 5.6   03/11/2022 6.6 (H)   06/16/2021 5.8             ( goal A1C is < 7)   BP Readings from Last 3 Encounters:   08/01/24 (!) 150/95   04/07/24 (!) 118/58   03/17/24 112/76          (goal 120/80)  BUN   Date Value Ref Range Status   04/07/2024 15 6 - 20 mg/dL Final     Creatinine   Date Value Ref Range Status   04/07/2024 0.9 0.7 - 1.2 mg/dL Final     Potassium   Date Value Ref Range Status   04/07/2024 4.6 3.7 - 5.3 mmol/L Final

## 2024-12-13 DIAGNOSIS — F33.1 MODERATE EPISODE OF RECURRENT MAJOR DEPRESSIVE DISORDER (HCC): ICD-10-CM

## 2024-12-13 RX ORDER — SERTRALINE HYDROCHLORIDE 100 MG/1
TABLET, FILM COATED ORAL
Qty: 45 TABLET | Refills: 0 | Status: SHIPPED | OUTPATIENT
Start: 2024-12-13

## 2024-12-13 RX ORDER — RISPERIDONE 0.5 MG/1
0.5 TABLET ORAL 2 TIMES DAILY
Qty: 60 TABLET | Refills: 0 | Status: SHIPPED | OUTPATIENT
Start: 2024-12-13

## 2024-12-27 ENCOUNTER — TELEPHONE (OUTPATIENT)
Dept: FAMILY MEDICINE CLINIC | Age: 44
End: 2024-12-27

## 2024-12-27 DIAGNOSIS — I10 ESSENTIAL HYPERTENSION: ICD-10-CM

## 2024-12-27 DIAGNOSIS — F43.10 POST TRAUMATIC STRESS DISORDER (PTSD): ICD-10-CM

## 2024-12-27 DIAGNOSIS — E11.65 TYPE 2 DIABETES MELLITUS WITH HYPERGLYCEMIA, WITHOUT LONG-TERM CURRENT USE OF INSULIN (HCC): Primary | ICD-10-CM

## 2024-12-27 DIAGNOSIS — F41.9 ANXIETY: ICD-10-CM

## 2024-12-27 DIAGNOSIS — F33.2 SEVERE EPISODE OF RECURRENT MAJOR DEPRESSIVE DISORDER, WITHOUT PSYCHOTIC FEATURES (HCC): ICD-10-CM

## 2024-12-27 DIAGNOSIS — E66.01 MORBID OBESITY WITH BMI OF 45.0-49.9, ADULT: ICD-10-CM

## 2024-12-27 NOTE — TELEPHONE ENCOUNTER
Patient was supposed to follow-up with me today in the office face-to-face  He did complete a PHQ-9 in the computer when I sent him the message  He lives in [place], previously I tried to refer him to a new PCP in the area  Dr Mushtaq Higgins  [address] [facility] [address] [phone]         He has diabetes, depression, PTSD, severe anxiety, noncompliance  Please have him establish with new PCP,           Diagnosis Orders   1. Type 2 diabetes mellitus with hyperglycemia, without long-term current use of insulin (HCC)  [facility] -  Referral to Care Management      2. Severe episode of recurrent major depressive disorder, without psychotic features (HCC)  [facility] -  Referral to Care Management      3. Post traumatic stress disorder (PTSD)  [facility] -  Referral to Care Management           No future appointments.      12/25/2024     2:13 AM 11/21/2023     2:35 PM 8/16/2023    12:21 PM   PHQ-9    Little interest or pleasure in doing things 2 3 2   Feeling down, depressed, or hopeless 3 3 2   Trouble falling or staying asleep, or sleeping too much 3 3 3   Feeling tired or having little energy 3 3 3   Poor appetite or overeating 3 3 3   Feeling bad about yourself - or that you are a failure or have let yourself or your family down 3 3 3   Trouble concentrating on things, such as reading the newspaper or watching television 3 3 3   Moving or speaking so slowly that other people could have noticed. Or the opposite - being so fidgety or restless that you have been moving around a lot more than usual 2 3 3   Thoughts that you would be better off dead, or of hurting yourself in some way 3 0 1   PHQ-2 Score 5 6 4   PHQ-9 Total Score 25 24 23   If you checked off any problems, how difficult have these problems made it for you to do your work, take care of things at home, or get along with other people? 3 1 3

## 2025-01-07 ENCOUNTER — CARE COORDINATION (OUTPATIENT)
Dept: CARE COORDINATION | Age: 45
End: 2025-01-07

## 2025-01-07 NOTE — CARE COORDINATION
PCP referred him to a new primary care practice since patient now lives in [place], [place], referred for care management to help with transfer.    Writer spoke with patient, he has not called to schedule with new provider yet, gave permission for writer (BHARAT) to call and schedule.    He denied any needs right now, stated has all medications but did not review today, he just woke up. No symptoms or concerns.    Besides PCP, he also follows with a psychiatrist at [facility] in [place].    Writer called to schedule new patient appt, message was sent by  Marija to [facility] to call patient to schedule since she was not able to find a new patient appt spot available. Notified her he requested an afternoon appt. He should get a call in 24-48 hours by the office to schedule appt. Sent MyChart with this information and the office phone number and address, will follow up next week.    No future appointments.

## 2025-01-10 DIAGNOSIS — F33.1 MODERATE EPISODE OF RECURRENT MAJOR DEPRESSIVE DISORDER (HCC): ICD-10-CM

## 2025-01-10 RX ORDER — RISPERIDONE 0.5 MG/1
0.5 TABLET ORAL 2 TIMES DAILY
Qty: 60 TABLET | Refills: 0 | Status: SHIPPED | OUTPATIENT
Start: 2025-01-10

## 2025-01-10 RX ORDER — SERTRALINE HYDROCHLORIDE 100 MG/1
TABLET, FILM COATED ORAL
Qty: 45 TABLET | Refills: 0 | Status: SHIPPED | OUTPATIENT
Start: 2025-01-10

## 2025-01-10 NOTE — TELEPHONE ENCOUNTER
Noted. Thank you!  Patient is in process of establishing with new PCP in [place], care coordination note from 1/7/2025 noted    No future appointments.

## 2025-01-10 NOTE — TELEPHONE ENCOUNTER
Please Approve or Refuse.  Send to Pharmacy per Pt's Request: LV     Next Visit Date: CALLED PT LVM   Last Visit Date: 11/21/2023    Hemoglobin A1C (%)   Date Value   08/16/2023 5.6   03/11/2022 6.6 (H)   06/16/2021 5.8             ( goal A1C is < 7)   BP Readings from Last 3 Encounters:   08/01/24 (!) 150/95   04/07/24 (!) 118/58   03/17/24 112/76          (goal 120/80)  BUN   Date Value Ref Range Status   04/07/2024 15 6 - 20 mg/dL Final     Creatinine   Date Value Ref Range Status   04/07/2024 0.9 0.7 - 1.2 mg/dL Final     Potassium   Date Value Ref Range Status   04/07/2024 4.6 3.7 - 5.3 mmol/L Final

## 2025-01-11 DIAGNOSIS — F33.1 MODERATE EPISODE OF RECURRENT MAJOR DEPRESSIVE DISORDER (HCC): ICD-10-CM

## 2025-01-13 RX ORDER — PRAZOSIN HYDROCHLORIDE 1 MG/1
1 CAPSULE ORAL NIGHTLY
Qty: 30 CAPSULE | Refills: 3 | Status: SHIPPED | OUTPATIENT
Start: 2025-01-13

## 2025-01-13 NOTE — TELEPHONE ENCOUNTER
Please Approve or Refuse.  Send to Pharmacy per Pt's Request:      Next Visit Date:  Visit date not found   Last Visit Date: 11/21/2023    Hemoglobin A1C (%)   Date Value   08/16/2023 5.6   03/11/2022 6.6 (H)   06/16/2021 5.8             ( goal A1C is < 7)   BP Readings from Last 3 Encounters:   08/01/24 (!) 150/95   04/07/24 (!) 118/58   03/17/24 112/76          (goal 120/80)  BUN   Date Value Ref Range Status   04/07/2024 15 6 - 20 mg/dL Final     Creatinine   Date Value Ref Range Status   04/07/2024 0.9 0.7 - 1.2 mg/dL Final     Potassium   Date Value Ref Range Status   04/07/2024 4.6 3.7 - 5.3 mmol/L Final

## 2025-01-14 ENCOUNTER — CARE COORDINATION (OUTPATIENT)
Dept: CARE COORDINATION | Age: 45
End: 2025-01-14

## 2025-01-14 NOTE — CARE COORDINATION
Ambulatory Care Coordination Note     2025 3:46 PM     Patient Current Location:  [place]     ACM contacted the patient by telephone. Verified name and  with patient as identifiers.         ACM: Rosmery Goldstein RN     Challenges to be reviewed by the provider   Additional needs identified to be addressed with provider No  none               Method of communication with provider: none.    Utilization: Patient has not had any utilization since our last call.     Care Summary Note: Spoke with patient. He has a new patient appt scheduled  at new PCP office in [place].  Attempted to discuss meds, he doesn't have them with him presently. He stated he thinks may need refills on some before the appt at new PCP office. Instructed him to send [employer] message to Dr. Krueger if needs refills before that appt.  He denied any needs or concerns right now.  Will not do full enrollment for care management but will follow up in a month after new patient appt to make sure he went to appt and assess for any needs for care management.        PCP/Specialist follow up:   Future Appointments         Provider Specialty Dept Phone    2025 9:20 AM Kaleigh Bhatia, APRN - [facility] [phone]            Follow Up:   Plan for next ACM outreach in approximately 1 month to complete:  - outreach attempt to offer care management services.   Patient  is agreeable to this plan.

## 2025-01-25 DIAGNOSIS — F33.1 MODERATE EPISODE OF RECURRENT MAJOR DEPRESSIVE DISORDER (HCC): ICD-10-CM

## 2025-01-27 RX ORDER — BUPROPION HYDROCHLORIDE 150 MG/1
150 TABLET, EXTENDED RELEASE ORAL 2 TIMES DAILY
Qty: 90 TABLET | Refills: 0 | Status: SHIPPED | OUTPATIENT
Start: 2025-01-27

## 2025-02-06 DIAGNOSIS — F33.1 MODERATE EPISODE OF RECURRENT MAJOR DEPRESSIVE DISORDER (HCC): ICD-10-CM

## 2025-02-06 RX ORDER — RISPERIDONE 0.5 MG/1
0.5 TABLET ORAL 2 TIMES DAILY
Qty: 60 TABLET | Refills: 0 | Status: SHIPPED | OUTPATIENT
Start: 2025-02-06

## 2025-02-06 RX ORDER — SERTRALINE HYDROCHLORIDE 100 MG/1
TABLET, FILM COATED ORAL
Qty: 45 TABLET | Refills: 0 | Status: SHIPPED | OUTPATIENT
Start: 2025-02-06

## 2025-02-10 ENCOUNTER — OFFICE VISIT (OUTPATIENT)
Dept: PRIMARY CARE CLINIC | Age: 45
End: 2025-02-10
Payer: COMMERCIAL

## 2025-02-10 ENCOUNTER — CARE COORDINATION (OUTPATIENT)
Dept: CARE COORDINATION | Age: 45
End: 2025-02-10

## 2025-02-10 VITALS
HEART RATE: 71 BPM | BODY MASS INDEX: 53.54 KG/M2 | WEIGHT: 315 LBS | SYSTOLIC BLOOD PRESSURE: 128 MMHG | RESPIRATION RATE: 18 BRPM | OXYGEN SATURATION: 96 % | DIASTOLIC BLOOD PRESSURE: 82 MMHG

## 2025-02-10 DIAGNOSIS — Z76.89 ENCOUNTER TO ESTABLISH CARE: Primary | ICD-10-CM

## 2025-02-10 DIAGNOSIS — G47.33 OSA (OBSTRUCTIVE SLEEP APNEA): ICD-10-CM

## 2025-02-10 DIAGNOSIS — F33.1 MODERATE EPISODE OF RECURRENT MAJOR DEPRESSIVE DISORDER (HCC): ICD-10-CM

## 2025-02-10 DIAGNOSIS — F33.2 SEVERE EPISODE OF RECURRENT MAJOR DEPRESSIVE DISORDER, WITHOUT PSYCHOTIC FEATURES (HCC): ICD-10-CM

## 2025-02-10 DIAGNOSIS — K21.9 GASTROESOPHAGEAL REFLUX DISEASE WITHOUT ESOPHAGITIS: ICD-10-CM

## 2025-02-10 DIAGNOSIS — E11.65 TYPE 2 DIABETES MELLITUS WITH HYPERGLYCEMIA, WITHOUT LONG-TERM CURRENT USE OF INSULIN (HCC): ICD-10-CM

## 2025-02-10 LAB — HBA1C MFR BLD: 8.4 %

## 2025-02-10 PROCEDURE — 99215 OFFICE O/P EST HI 40 MIN: CPT | Performed by: NURSE PRACTITIONER

## 2025-02-10 PROCEDURE — 3074F SYST BP LT 130 MM HG: CPT | Performed by: NURSE PRACTITIONER

## 2025-02-10 PROCEDURE — 3079F DIAST BP 80-89 MM HG: CPT | Performed by: NURSE PRACTITIONER

## 2025-02-10 PROCEDURE — 2022F DILAT RTA XM EVC RTNOPTHY: CPT | Performed by: NURSE PRACTITIONER

## 2025-02-10 PROCEDURE — G8427 DOCREV CUR MEDS BY ELIG CLIN: HCPCS | Performed by: NURSE PRACTITIONER

## 2025-02-10 PROCEDURE — 1036F TOBACCO NON-USER: CPT | Performed by: NURSE PRACTITIONER

## 2025-02-10 PROCEDURE — 83036 HEMOGLOBIN GLYCOSYLATED A1C: CPT | Performed by: NURSE PRACTITIONER

## 2025-02-10 PROCEDURE — 3052F HG A1C>EQUAL 8.0%<EQUAL 9.0%: CPT | Performed by: NURSE PRACTITIONER

## 2025-02-10 PROCEDURE — G8417 CALC BMI ABV UP PARAM F/U: HCPCS | Performed by: NURSE PRACTITIONER

## 2025-02-10 RX ORDER — SERTRALINE HYDROCHLORIDE 100 MG/1
200 TABLET, FILM COATED ORAL DAILY
Qty: 180 TABLET | Refills: 1 | Status: CANCELLED | OUTPATIENT
Start: 2025-02-10

## 2025-02-10 RX ORDER — PALIPERIDONE 9 MG/1
9 TABLET, EXTENDED RELEASE ORAL EVERY MORNING
COMMUNITY

## 2025-02-10 SDOH — ECONOMIC STABILITY: FOOD INSECURITY: WITHIN THE PAST 12 MONTHS, YOU WORRIED THAT YOUR FOOD WOULD RUN OUT BEFORE YOU GOT MONEY TO BUY MORE.: PATIENT DECLINED

## 2025-02-10 SDOH — ECONOMIC STABILITY: FOOD INSECURITY: WITHIN THE PAST 12 MONTHS, THE FOOD YOU BOUGHT JUST DIDN'T LAST AND YOU DIDN'T HAVE MONEY TO GET MORE.: PATIENT DECLINED

## 2025-02-10 ASSESSMENT — PATIENT HEALTH QUESTIONNAIRE - PHQ9
1. LITTLE INTEREST OR PLEASURE IN DOING THINGS: NEARLY EVERY DAY
3. TROUBLE FALLING OR STAYING ASLEEP: NEARLY EVERY DAY
10. IF YOU CHECKED OFF ANY PROBLEMS, HOW DIFFICULT HAVE THESE PROBLEMS MADE IT FOR YOU TO DO YOUR WORK, TAKE CARE OF THINGS AT HOME, OR GET ALONG WITH OTHER PEOPLE: NOT DIFFICULT AT ALL
SUM OF ALL RESPONSES TO PHQ9 QUESTIONS 1 & 2: 6
7. TROUBLE CONCENTRATING ON THINGS, SUCH AS READING THE NEWSPAPER OR WATCHING TELEVISION: NEARLY EVERY DAY
SUM OF ALL RESPONSES TO PHQ QUESTIONS 1-9: 21
4. FEELING TIRED OR HAVING LITTLE ENERGY: NEARLY EVERY DAY
8. MOVING OR SPEAKING SO SLOWLY THAT OTHER PEOPLE COULD HAVE NOTICED. OR THE OPPOSITE, BEING SO FIGETY OR RESTLESS THAT YOU HAVE BEEN MOVING AROUND A LOT MORE THAN USUAL: NEARLY EVERY DAY
SUM OF ALL RESPONSES TO PHQ QUESTIONS 1-9: 21
SUM OF ALL RESPONSES TO PHQ QUESTIONS 1-9: 21
2. FEELING DOWN, DEPRESSED OR HOPELESS: NEARLY EVERY DAY
9. THOUGHTS THAT YOU WOULD BE BETTER OFF DEAD, OR OF HURTING YOURSELF: NOT AT ALL
5. POOR APPETITE OR OVEREATING: NEARLY EVERY DAY
SUM OF ALL RESPONSES TO PHQ QUESTIONS 1-9: 21
6. FEELING BAD ABOUT YOURSELF - OR THAT YOU ARE A FAILURE OR HAVE LET YOURSELF OR YOUR FAMILY DOWN: NOT AT ALL

## 2025-02-10 ASSESSMENT — COLUMBIA-SUICIDE SEVERITY RATING SCALE - C-SSRS
6. HAVE YOU EVER DONE ANYTHING, STARTED TO DO ANYTHING, OR PREPARED TO DO ANYTHING TO END YOUR LIFE?: NO
2. HAVE YOU ACTUALLY HAD ANY THOUGHTS OF KILLING YOURSELF?: NO
1. WITHIN THE PAST MONTH, HAVE YOU WISHED YOU WERE DEAD OR WISHED YOU COULD GO TO SLEEP AND NOT WAKE UP?: NO

## 2025-02-10 ASSESSMENT — ENCOUNTER SYMPTOMS
GASTROINTESTINAL NEGATIVE: 1
ALLERGIC/IMMUNOLOGIC NEGATIVE: 1
RESPIRATORY NEGATIVE: 1
EYES NEGATIVE: 1

## 2025-02-10 NOTE — PROGRESS NOTES
Results   Component Value Date/Time    WBC 12.3 04/07/2024 04:24 PM    RBC 5.45 04/07/2024 04:24 PM    HGB 15.6 04/07/2024 04:24 PM    HCT 46.8 04/07/2024 04:24 PM    MCV 85.9 04/07/2024 04:24 PM    MCH 28.6 04/07/2024 04:24 PM    MCHC 33.3 04/07/2024 04:24 PM    RDW 14.0 04/07/2024 04:24 PM     04/07/2024 04:24 PM    MPV 10.7 04/07/2024 04:24 PM     Lab Results   Component Value Date/Time    TSH 1.60 10/29/2022 06:26 PM     Lab Results   Component Value Date/Time    CHOL 130 06/16/2021 01:23 PM    LDL 58 06/16/2021 01:23 PM    HDL 49 06/16/2021 01:23 PM    LABA1C 8.4 02/10/2025 09:15 AM       Assessment/Plan:      Diagnosis Orders   1. Encounter to establish care        2. AGUS (obstructive sleep apnea)        3. Type 2 diabetes mellitus with hyperglycemia, without long-term current use of insulin (HCC)  POCT glycosylated hemoglobin (Hb A1C)    metFORMIN (GLUCOPHAGE) 500 MG tablet      4. Moderate episode of recurrent major depressive disorder (HCC)        5. Gastroesophageal reflux disease without esophagitis        6. Severe episode of recurrent major depressive disorder, without psychotic features (HCC)          Start Metformin 500 mg BID with meals.  Call office with fasting blood sugar readings in 3-4 weeks and discussed increasing Metformin dose if tolerating well.  Will continue to closely monitor.    Encouraged to complete sleep study test.  Case management referral to help with coordinating.    Letter provided for a support pet for his apartment.    1.  Chuck received counseling on healthy behaviors and Education discussed during visit.  2.  Patient given educational materials - see patient instructions  3.  Patient was provided AVS.  4.  Discussed use, benefit, and side effects of prescribed medications.  Barriers to medication compliance addressed.  All patient questions answered.Pt voiced understanding.   5.  Reviewed prior labs and health maintenance  6.  Continue current medications, diet and

## 2025-02-10 NOTE — CARE COORDINATION
Ambulatory Care Coordination Note     2/10/2025      Care Coordination program taken over by writer (Rosmery martinez) due to patient establishing with [place] PCP and moving to [place].       This patient was received as a referral from Provider.        Hemoglobin A1C   Date Value Ref Range Status   02/10/2025 8.4 % Final   Previous 8/16/23 result: 5.6         Call placed to [facility]. Spoke with staff about patient situation. Staff states that significant other can accommodate him and be present in the room, however the significant other must sleep in the recliner and not in the bed with the patient. States they do offer in home sleep study as well- patient would have to come  equipment from the hospital and sign a consent form but testing could be completed at home. Does state that [facility] requires pre auth typically as well.     Call placed to girlfriend, Lisa. Unable to reach. VM left.     Call placed to patient. Spoke with Chuck. Updated Chuck on conversation with sleep center. Voiced understanding. Requesting in home sleep study. Denied other needs today.       Future Appointments   Date Time Provider Department Center   5/12/2025  4:00 PM Kaleigh Bhatia, APRN - CNP [facility] E[facility] DEP       Care Coordination Plan of Care:   This nurse Care Coordinator will  - update PCP   -order sleep study   -plan outreach call in 3 weeks- scheduled for sleep study?   -follow up DM- elevated A1C. Get fasting blood sugars and update PCP. Started metformin at new patient apt

## 2025-02-12 DIAGNOSIS — E78.5 HYPERLIPIDEMIA WITH TARGET LDL LESS THAN 100: ICD-10-CM

## 2025-02-13 RX ORDER — ATORVASTATIN CALCIUM 10 MG/1
10 TABLET, FILM COATED ORAL DAILY
Qty: 90 TABLET | Refills: 0 | Status: SHIPPED | OUTPATIENT
Start: 2025-02-13

## 2025-02-17 DIAGNOSIS — G47.33 OSA (OBSTRUCTIVE SLEEP APNEA): Primary | ICD-10-CM

## 2025-03-04 ENCOUNTER — CARE COORDINATION (OUTPATIENT)
Dept: CARE COORDINATION | Age: 45
End: 2025-03-04

## 2025-03-04 NOTE — CARE COORDINATION
Ambulatory Care Coordination Note     3/4/2025      Chart reviewed   -noted home sleep study was scheduled for 3/5/25   -confirmed this appointment yesterday     Future Appointments   Date Time Provider Department Center   3/5/2025  7:15 PM [facility]   5/12/2025  4:00 PM Kaleigh Bhatia, STEPHON - CNP [facility] DEP       Care Coordination Plan of Care:   This nurse Care Coordinator will  - plan to outreach to patient within 1 week   -follow up on home sleep study- results  -any new needs  -how is counseling

## 2025-03-05 ENCOUNTER — HOSPITAL ENCOUNTER (OUTPATIENT)
Dept: SLEEP CENTER | Age: 45
Discharge: HOME | End: 2025-03-05

## 2025-03-05 VITALS — HEIGHT: 78 IN | BODY MASS INDEX: 36.45 KG/M2 | WEIGHT: 315 LBS

## 2025-03-05 DIAGNOSIS — G47.33 OSA (OBSTRUCTIVE SLEEP APNEA): ICD-10-CM

## 2025-03-05 ASSESSMENT — SLEEP AND FATIGUE QUESTIONNAIRES
HOW LIKELY ARE YOU TO NOD OFF OR FALL ASLEEP IN A CAR, WHILE STOPPED FOR A FEW MINUTES IN TRAFFIC: WOULD NEVER DOZE
FOR THE FIRST 30 MINUTES AFTER WAKING, I AM: SOMEWHAT DROWSY
HOW LIKELY ARE YOU TO NOD OFF OR FALL ASLEEP WHILE WATCHING TV: WOULD NEVER DOZE
DO YOU SNORE: YES
HOW LIKELY ARE YOU TO NOD OFF OR FALL ASLEEP WHILE SITTING INACTIVE IN A PUBLIC PLACE: WOULD NEVER DOZE
I SLEEP WELL: NO
HAS ANYONE NOTICED THAT YOU QUIT BREATHING DURING SLEEP: ALMOST DAILY
HOW OFTEN DO YOU SNORE: ALMOST DAILY
ESS TOTAL SCORE: 6
WHAT TIME DO YOU USUALLY GO TO BED: 10800
HAVE YOU EVER NODDED OFF OR FALLEN ASLEEP WHILE DRIVING: NO
HOW LIKELY ARE YOU TO NOD OFF OR FALL ASLEEP WHILE SITTING AND TALKING TO SOMEONE: WOULD NEVER DOZE
HOW LIKELY ARE YOU TO NOD OFF OR FALL ASLEEP WHILE SITTING QUIETLY AFTER LUNCH WITHOUT ALCOHOL: HIGH CHANCE OF DOZING
WHAT TIME DO YOU USUALLY WAKE UP: 46800
ARE YOU TIRED AFTER SLEEPING: ALMOST DAILY
USUAL AMOUNT OF TIME TO FALL ASLEEP (MIN): 60
HOW MANY NAPS DO YOU TAKE PER WEEK: 0
DO YOU HAVE HIGH BLOOD PRESSURE: YES
HOW LIKELY ARE YOU TO NOD OFF OR FALL ASLEEP WHILE SITTING AND READING: WOULD NEVER DOZE
HOW LIKELY ARE YOU TO NOD OFF OR FALL ASLEEP WHEN YOU ARE A PASSENGER IN A CAR FOR AN HOUR WITHOUT A BREAK: WOULD NEVER DOZE
HOW LIKELY ARE YOU TO NOD OFF OR FALL ASLEEP WHILE LYING DOWN TO REST IN THE AFTERNOON WHEN CIRCUMSTANCES PERMIT: HIGH CHANCE OF DOZING
NORMAL AMOUNT OF SLEEP PER NIGHT: 10
DOES YOUR SNORING BOTHER OTHERS: YES
ARE YOU TIRED DURING WAKE TIME: ALMOST DAILY
FUNCTION BEST IN: EVENING
NUMBER OF TIMES YOU WAKE PER NIGHT: 3
SNORING VOLUME: AS LOUD AS TALKING

## 2025-03-06 NOTE — PROGRESS NOTES
Patient arrived for home sleep testing. Testing explained, all questions answered, pt voices understanding. Patient will return unit #9 to ER tomorrow

## 2025-03-09 DIAGNOSIS — F33.1 MODERATE EPISODE OF RECURRENT MAJOR DEPRESSIVE DISORDER (HCC): ICD-10-CM

## 2025-03-10 RX ORDER — SERTRALINE HYDROCHLORIDE 100 MG/1
TABLET, FILM COATED ORAL
Qty: 45 TABLET | Refills: 0 | Status: SHIPPED | OUTPATIENT
Start: 2025-03-10

## 2025-03-10 NOTE — TELEPHONE ENCOUNTER
Please Approve or Refuse.  Send to Pharmacy per Pt's Request:      Next Visit Date:  Visit date not found   Last Visit Date: 11/21/2023    Hemoglobin A1C (%)   Date Value   02/10/2025 8.4   08/16/2023 5.6   03/11/2022 6.6 (H)             ( goal A1C is < 7)   BP Readings from Last 3 Encounters:   02/10/25 128/82   08/01/24 (!) 150/95   04/07/24 (!) 118/58          (goal 120/80)  BUN   Date Value Ref Range Status   04/07/2024 15 6 - 20 mg/dL Final     Creatinine   Date Value Ref Range Status   04/07/2024 0.9 0.7 - 1.2 mg/dL Final     Potassium   Date Value Ref Range Status   04/07/2024 4.6 3.7 - 5.3 mmol/L Final

## 2025-03-11 ENCOUNTER — HOSPITAL ENCOUNTER (OUTPATIENT)
Dept: SLEEP CENTER | Age: 45
Discharge: HOME | End: 2025-03-11
Payer: COMMERCIAL

## 2025-03-11 VITALS — WEIGHT: 315 LBS | HEIGHT: 78 IN | BODY MASS INDEX: 36.45 KG/M2

## 2025-03-11 PROCEDURE — 95806 SLEEP STUDY UNATT&RESP EFFT: CPT

## 2025-03-11 NOTE — PROGRESS NOTES
Patient arrived for home sleep testing on 3/11/2025 at 7:15 pm. Testing explained, all questions answered, pt voices understanding.     Pt agreed to return unit #9.

## 2025-03-13 ENCOUNTER — HOSPITAL ENCOUNTER (EMERGENCY)
Age: 45
Discharge: HOME | End: 2025-03-13
Payer: COMMERCIAL

## 2025-03-13 VITALS
DIASTOLIC BLOOD PRESSURE: 83 MMHG | TEMPERATURE: 98 F | BODY MASS INDEX: 36.45 KG/M2 | HEIGHT: 78 IN | OXYGEN SATURATION: 94 % | HEART RATE: 64 BPM | RESPIRATION RATE: 20 BRPM | WEIGHT: 315 LBS | SYSTOLIC BLOOD PRESSURE: 129 MMHG

## 2025-03-13 PROCEDURE — 93005 ELECTROCARDIOGRAM TRACING: CPT | Performed by: EMERGENCY MEDICINE

## 2025-03-14 ENCOUNTER — CARE COORDINATION (OUTPATIENT)
Dept: CARE COORDINATION | Age: 45
End: 2025-03-14

## 2025-03-14 LAB
EKG ATRIAL RATE: 71 BPM
EKG P AXIS: 40 DEGREES
EKG P-R INTERVAL: 158 MS
EKG Q-T INTERVAL: 386 MS
EKG QRS DURATION: 94 MS
EKG QTC CALCULATION (BAZETT): 419 MS
EKG R AXIS: -16 DEGREES
EKG T AXIS: 36 DEGREES
EKG VENTRICULAR RATE: 71 BPM

## 2025-03-14 PROCEDURE — 93010 ELECTROCARDIOGRAM REPORT: CPT | Performed by: FAMILY MEDICINE

## 2025-03-14 NOTE — CARE COORDINATION
Ambulatory Care Coordination Note     3/14/2025      Chart reviewed   Noted home sleep study from 3/5 did not run. No report. Sleep study had to be repeated on 3/11.     Future Appointments   Date Time Provider Department Center   5/12/2025  4:00 PM Kaleigh Bhatia, APRN - CNP [facility] DEP       Care Coordination Plan of Care:   This nurse Care Coordinator will  - plan outreach/follow up in 1-2 weeks   -any other needs?   -if no new needs will plan to graduate from care coordination

## 2025-04-01 ENCOUNTER — CARE COORDINATION (OUTPATIENT)
Dept: CARE COORDINATION | Age: 45
End: 2025-04-01

## 2025-04-01 DIAGNOSIS — E11.65 TYPE 2 DIABETES MELLITUS WITH HYPERGLYCEMIA, WITHOUT LONG-TERM CURRENT USE OF INSULIN (HCC): Primary | ICD-10-CM

## 2025-04-01 DIAGNOSIS — E11.65 TYPE 2 DIABETES MELLITUS WITH HYPERGLYCEMIA, WITHOUT LONG-TERM CURRENT USE OF INSULIN (HCC): ICD-10-CM

## 2025-04-01 RX ORDER — DAPAGLIFLOZIN 5 MG/1
5 TABLET, FILM COATED ORAL EVERY MORNING
Qty: 90 TABLET | Refills: 1 | Status: SHIPPED | OUTPATIENT
Start: 2025-04-01 | End: 2025-04-01

## 2025-04-01 RX ORDER — DAPAGLIFLOZIN 10 MG/1
5 TABLET, FILM COATED ORAL EVERY MORNING
Qty: 30 TABLET | Refills: 3 | Status: SHIPPED | OUTPATIENT
Start: 2025-04-01

## 2025-04-01 RX ORDER — DAPAGLIFLOZIN 5 MG/1
5 TABLET, FILM COATED ORAL EVERY MORNING
Qty: 90 TABLET | Refills: 1 | Status: CANCELLED | OUTPATIENT
Start: 2025-04-01 | End: 2025-06-30

## 2025-04-01 NOTE — CARE COORDINATION
Ambulatory Care Coordination Note     2025 1:19 PM     Patient Current Location:  Home: [address]  [place] [address]     ACM contacted the patient by telephone. Verified name and  with patient as identifiers.         ACM: Yara Mcgee RN     Challenges to be reviewed by the provider   Additional needs identified to be addressed with provider Yes  medications-metformin taking as prescribed, blood sugars remain high.                Method of communication with provider: chart routing.    Utilization: Patient has not had any utilization since our last call.     Care Summary Note: Spoke with Chuck. Reports he is doing pretty well. Reports that he is checking fasting blood sugar in the morning. States they are still a little high. Writer asked average what the readings are and Chuck stated around 200. Chuck states that it may be due to his diet as well. Confirmed he is taking Metformin 500 mg BID with meals. Denied any issues with starting Metformin- denied nausea,vomiting abdominal cramping. Agreeable to having writer update PCP.   Writer questioned about home sleep study. Chuck reports that he did complete this and waiting on results. Chart review- looks like sleep study completed on 3/11/25.     Medications Reviewed:   Completed during this call    PCP/Specialist follow up:   Future Appointments         Provider Specialty Dept Phone    2025 4:00 PM Kaleigh Bhatia, APRN - [facility] [phone]            Follow Up:   Plan for next ACM outreach in approximately 2 weeks to complete:  - disease specific assessments  - medication review   - goal progression  - education .   Patient  is agreeable to this plan.

## 2025-04-01 NOTE — CARE COORDINATION
4:02 PM  Call placed to patient. Spoke with Chuck. Informed him of PCP message below. Voiced understanding. Educated that he can use up his Metformin 500 mg tablets he has remaining at home, would take 2 tablets BID- make sure to look at bottle label when he gets new medication as he would just need 1 tablet BID for new medication strength. Voiced understanding. Did offer referral to diabetic educator or dietitian at [facility]. Patient stated \"not at this time.\" Offered referral to care coordination dietician who does phone calls/mailings and patient states he is \"ok for now.\" Informed Chuck that sleep study results were in process of being uploaded and when available, PCP will review and let him know results. Chuck voiced understanding.           Future Appointments   Date Time Provider Department Center   5/12/2025  4:00 PM Kaleigh Bhatia, APRN - CNP [facility] DEP     Care Coordination Plan of Care:   This nurse Care Coordinator will  - plan outreach call to patient in 2-3 weeks   -follow up getting new medications  -blood sugar readings

## 2025-04-01 NOTE — TELEPHONE ENCOUNTER
Health Maintenance   Topic Date Due    Diabetic retinal exam  Never done    Hepatitis B vaccine (1 of 3 - 19+ 3-dose series) Never done    Pneumococcal 0-49 years Vaccine (2 of 2 - PCV) 12/28/2017    Diabetic Alb to Cr ratio (uACR) test  06/16/2022    Lipids  06/16/2022    Diabetic foot exam  08/16/2024    COVID-19 Vaccine (1 - 2024-25 season) Never done    GFR test (Diabetes, CKD 3-4, OR last GFR 15-59)  04/07/2025    Flu vaccine (Season Ended) 08/01/2025    A1C test (Diabetic or Prediabetic)  02/10/2026    Depression Monitoring  02/10/2026    DTaP/Tdap/Td vaccine (2 - Td or Tdap) 12/28/2026    Hepatitis C screen  Completed    HIV screen  Completed    Hepatitis A vaccine  Aged Out    Hib vaccine  Aged Out    HPV vaccine  Aged Out    Polio vaccine  Aged Out    Meningococcal (ACWY) vaccine  Aged Out    Meningococcal B vaccine  Aged Out    Varicella vaccine  Discontinued             (applicable per patient's age: Cancer Screenings, Depression Screening, Fall Risk Screening, Immunizations)    Hemoglobin A1C (%)   Date Value   02/10/2025 8.4   08/16/2023 5.6   03/11/2022 6.6 (H)     AST (U/L)   Date Value   11/15/2023 26     ALT (U/L)   Date Value   11/15/2023 21     BUN (mg/dL)   Date Value   04/07/2024 15      (goal A1C is < 7)   (goal LDL is <100) need 30-50% reduction from baseline     BP Readings from Last 3 Encounters:   02/10/25 128/82   08/01/24 (!) 150/95   04/07/24 (!) 118/58    (goal /80)      All Future Testing planned in [facility]:  Lab Frequency Next Occurrence       Next Visit Date:  Future Appointments   Date Time Provider Department Center   5/12/2025  4:00 PM Kaleigh Bhatia, APRN - CNP [facility] DEP            Patient Active Problem List:     Essential hypertension     Anxiety     Chronic fatigue     Snoring     Atypical chest pain     Family history of cardiac disorder in paternal grandmother     Ex-smoker     Moderate episode of recurrent major depressive disorder (HCC)     Morbid

## 2025-04-02 ENCOUNTER — RESULTS FOLLOW-UP (OUTPATIENT)
Dept: PRIMARY CARE CLINIC | Age: 45
End: 2025-04-02

## 2025-04-02 NOTE — TELEPHONE ENCOUNTER
----- Message from STEPHON Sharp CNP sent at 4/2/2025  9:10 AM EDT -----  Please notify patient of sleep study  results show mild AGUS.  I signed papers yesterday for his equipment.   Thanks Kaleigh

## 2025-04-21 ENCOUNTER — CARE COORDINATION (OUTPATIENT)
Dept: CARE COORDINATION | Age: 45
End: 2025-04-21

## 2025-04-21 NOTE — CARE COORDINATION
Ambulatory Care Coordination Note     2025      Patient Current Location:  Home: [facility] [address]  [place] [address]     ACM contacted the patient by telephone. Verified name and  with patient as identifiers.         ACM: Yara Mcgee RN     Challenges to be reviewed by the provider   Additional needs identified to be addressed with provider No  none               Method of communication with provider: none.    Utilization: Patient has not had any utilization since our last call.     Care Summary Note: Spoke with Chuck. States that he did get new strength of metformin. Denied any abdominal side effects- no nausea, diarrhea or abdominal cramping with increased dose. States he did get Farxiga. Denied any questions or concerns about medications. Questioned patient how blood sugar readings are, and stated \"I have not checked yet today.\" Did not provide any readings to ACM. States he did get sleep study results. Has not yet gotten his equipment. Denied any new needs today.       Medications Reviewed:   Completed during this call      PCP/Specialist follow up:   Future Appointments         Provider Specialty Dept Phone    2025 4:00 PM Kaleigh Bhatia, STEPHON - [facility] [phone]            Follow Up:   Plan for next ACM outreach in approximately 2 weeks to complete:  - disease specific assessments  - medication review   - goal progression  - education .   -how are blood sugar readings   Patient  is agreeable to this plan.

## 2025-05-07 DIAGNOSIS — F33.1 MODERATE EPISODE OF RECURRENT MAJOR DEPRESSIVE DISORDER (HCC): ICD-10-CM

## 2025-05-07 DIAGNOSIS — I10 ESSENTIAL HYPERTENSION: ICD-10-CM

## 2025-05-08 RX ORDER — ATENOLOL 100 MG/1
TABLET ORAL
Qty: 90 TABLET | Refills: 3 | Status: SHIPPED | OUTPATIENT
Start: 2025-05-08

## 2025-05-08 RX ORDER — SERTRALINE HYDROCHLORIDE 100 MG/1
100 TABLET, FILM COATED ORAL DAILY
Qty: 90 TABLET | Refills: 0 | Status: SHIPPED | OUTPATIENT
Start: 2025-05-08 | End: 2025-08-06

## 2025-05-12 ENCOUNTER — CARE COORDINATION (OUTPATIENT)
Dept: CARE COORDINATION | Age: 45
End: 2025-05-12

## 2025-05-12 NOTE — CARE COORDINATION
Ambulatory Care Coordination Note     5/12/2025 1:55 PM     Planned to make care coordination outreach call to patient today to follow up getting equipment for AGUS and follow up blood sugar readings.     Noted patient has scheduled PCP appointment today at 4:00 pm. Call deferred at this time.    Future Appointments   Date Time Provider Department Center   5/12/2025  4:00 PM Kaleigh Bhatia, APRN - CNP [facility] DEP         Care Coordination Plan of Care:   This nurse Care Coordinator will  - plan outreach call to patient in 1-2 weeks   -follow up PCP apt from 5/12  -medication review  -education   -any new needs?

## 2025-05-15 ENCOUNTER — HOSPITAL ENCOUNTER (OUTPATIENT)
Age: 45
Setting detail: SPECIMEN
Discharge: HOME | End: 2025-05-15
Payer: COMMERCIAL

## 2025-05-15 ENCOUNTER — OFFICE VISIT (OUTPATIENT)
Dept: PRIMARY CARE CLINIC | Age: 45
End: 2025-05-15
Payer: COMMERCIAL

## 2025-05-15 VITALS
DIASTOLIC BLOOD PRESSURE: 72 MMHG | HEART RATE: 73 BPM | SYSTOLIC BLOOD PRESSURE: 134 MMHG | WEIGHT: 315 LBS | BODY MASS INDEX: 51.86 KG/M2 | RESPIRATION RATE: 16 BRPM | OXYGEN SATURATION: 98 % | TEMPERATURE: 97 F

## 2025-05-15 DIAGNOSIS — E11.65 TYPE 2 DIABETES MELLITUS WITH HYPERGLYCEMIA, WITHOUT LONG-TERM CURRENT USE OF INSULIN (HCC): Primary | ICD-10-CM

## 2025-05-15 LAB — HBA1C MFR BLD: 7.7 %

## 2025-05-15 PROCEDURE — G8427 DOCREV CUR MEDS BY ELIG CLIN: HCPCS | Performed by: NURSE PRACTITIONER

## 2025-05-15 PROCEDURE — 83036 HEMOGLOBIN GLYCOSYLATED A1C: CPT | Performed by: NURSE PRACTITIONER

## 2025-05-15 PROCEDURE — G8417 CALC BMI ABV UP PARAM F/U: HCPCS | Performed by: NURSE PRACTITIONER

## 2025-05-15 PROCEDURE — 99214 OFFICE O/P EST MOD 30 MIN: CPT | Performed by: NURSE PRACTITIONER

## 2025-05-15 PROCEDURE — 3051F HG A1C>EQUAL 7.0%<8.0%: CPT | Performed by: NURSE PRACTITIONER

## 2025-05-15 PROCEDURE — 2022F DILAT RTA XM EVC RTNOPTHY: CPT | Performed by: NURSE PRACTITIONER

## 2025-05-15 PROCEDURE — 82570 ASSAY OF URINE CREATININE: CPT

## 2025-05-15 PROCEDURE — 82043 UR ALBUMIN QUANTITATIVE: CPT

## 2025-05-15 PROCEDURE — 3075F SYST BP GE 130 - 139MM HG: CPT | Performed by: NURSE PRACTITIONER

## 2025-05-15 PROCEDURE — 3078F DIAST BP <80 MM HG: CPT | Performed by: NURSE PRACTITIONER

## 2025-05-15 PROCEDURE — 1036F TOBACCO NON-USER: CPT | Performed by: NURSE PRACTITIONER

## 2025-05-15 RX ORDER — DAPAGLIFLOZIN 10 MG/1
10 TABLET, FILM COATED ORAL EVERY MORNING
Qty: 90 TABLET | Refills: 0 | Status: SHIPPED | OUTPATIENT
Start: 2025-05-15

## 2025-05-15 RX ORDER — LOPERAMIDE HYDROCHLORIDE 2 MG/1
2 CAPSULE ORAL 4 TIMES DAILY PRN
Qty: 40 CAPSULE | Refills: 0 | Status: SHIPPED | OUTPATIENT
Start: 2025-05-15 | End: 2025-05-25

## 2025-05-15 ASSESSMENT — PATIENT HEALTH QUESTIONNAIRE - PHQ9
SUM OF ALL RESPONSES TO PHQ QUESTIONS 1-9: 0
5. POOR APPETITE OR OVEREATING: NOT AT ALL
2. FEELING DOWN, DEPRESSED OR HOPELESS: NOT AT ALL
SUM OF ALL RESPONSES TO PHQ QUESTIONS 1-9: 0
10. IF YOU CHECKED OFF ANY PROBLEMS, HOW DIFFICULT HAVE THESE PROBLEMS MADE IT FOR YOU TO DO YOUR WORK, TAKE CARE OF THINGS AT HOME, OR GET ALONG WITH OTHER PEOPLE: NOT DIFFICULT AT ALL
1. LITTLE INTEREST OR PLEASURE IN DOING THINGS: NOT AT ALL
SUM OF ALL RESPONSES TO PHQ QUESTIONS 1-9: 0
6. FEELING BAD ABOUT YOURSELF - OR THAT YOU ARE A FAILURE OR HAVE LET YOURSELF OR YOUR FAMILY DOWN: NOT AT ALL
3. TROUBLE FALLING OR STAYING ASLEEP: NOT AT ALL
4. FEELING TIRED OR HAVING LITTLE ENERGY: NOT AT ALL
9. THOUGHTS THAT YOU WOULD BE BETTER OFF DEAD, OR OF HURTING YOURSELF: NOT AT ALL
8. MOVING OR SPEAKING SO SLOWLY THAT OTHER PEOPLE COULD HAVE NOTICED. OR THE OPPOSITE, BEING SO FIGETY OR RESTLESS THAT YOU HAVE BEEN MOVING AROUND A LOT MORE THAN USUAL: NOT AT ALL
SUM OF ALL RESPONSES TO PHQ QUESTIONS 1-9: 0
7. TROUBLE CONCENTRATING ON THINGS, SUCH AS READING THE NEWSPAPER OR WATCHING TELEVISION: NOT AT ALL

## 2025-05-15 ASSESSMENT — ENCOUNTER SYMPTOMS
ALLERGIC/IMMUNOLOGIC NEGATIVE: 1
RESPIRATORY NEGATIVE: 1
EYES NEGATIVE: 1
DIARRHEA: 1

## 2025-05-15 NOTE — PATIENT INSTRUCTIONS
SURVEY:     You may be receiving a survey from [facility] [employer] regarding your visit today.     Please complete the survey to enable us to provide the highest quality of care to you and your family.     If you cannot score us a very good on any question, please call the office to discuss how we could have made your experience a very good one.     Thank you,    Robert Blue, APRN-CNP  Kaleigh Bhatia, APRN-CNP  Jes, LPN  Chanelle, CMA  Iggy, CMA  Rachell, CMA  Pilar, PCA  Ekaterina, CMA  Paula, PM

## 2025-05-15 NOTE — PROGRESS NOTES
[facility]  MARIUSZ THOMAS, MANNY  [facility]  [address] [place] [address]  Dept: [phone]  Dept Fax: [phone]      Name: Chuck Carter  : 1980         Chief Complaint:     Chief Complaint   Patient presents with    Diabetes     3 month check. Patient was started on Metformin and is doing well besides shaving frequent diarrhea.        History of Present Illness:      Chuck Carter is a 44 y.o.  male who presents with Diabetes (3 month check. Patient was started on Metformin and is doing well besides shaving frequent diarrhea. )      BRANDON Woods is here today for a routine office visit.  He has been having a lot of diarrhea with the Metformin.  He has made some dietary changes and increased his activity.  He had some loperamide at home that did help but he is out and requesting a refill.  He has no other medical concerns today.    A1C 7.7 today    Past Medical History:     Past Medical History:   Diagnosis Date    Anxiety     Chronic left-sided low back pain with left-sided sciatica 2018    Depression     Erectile dysfunction 2021    Essential hypertension 2015    Gastroesophageal reflux disease without esophagitis 10/24/2018    Headache     Hyperglycemia 10/10/2017    Hyperlipidemia     Hypertension     Intermittent explosive disorder in adult 10/24/2017    Irritable bowel syndrome with diarrhea 2021    Morbid obesity with BMI of 45.0-49.9, adult (HCC) 10/10/2017    Other sleep apnea     Palpitations 2018    Peripheral edema 3/9/2020    Positive Clay's Sign 10/10/2017    RUQ pain 10/10/2017    Type 2 diabetes mellitus with hyperglycemia, without long-term current use of insulin ([facility]) 3/9/2020    Unintended weight loss 2021      Reviewed all health maintenance requirements and ordered appropriate tests  Health Maintenance Due   Topic Date Due    Diabetic retinal exam  Never done    Hepatitis B vaccine (1 of 3 - 19+ 3-dose series)

## 2025-05-16 DIAGNOSIS — E11.65 TYPE 2 DIABETES MELLITUS WITH HYPERGLYCEMIA, WITHOUT LONG-TERM CURRENT USE OF INSULIN (HCC): ICD-10-CM

## 2025-05-16 LAB
CREAT UR-MCNC: 67.8 MG/DL (ref 39–259)
MICROALBUMIN UR-MCNC: <12 MG/L (ref 0–20)
MICROALBUMIN/CREAT UR-RTO: NORMAL MCG/MG CREAT (ref 0–17)

## 2025-06-03 ENCOUNTER — CARE COORDINATION (OUTPATIENT)
Dept: CARE COORDINATION | Age: 45
End: 2025-06-03

## 2025-06-03 NOTE — CARE COORDINATION
Ambulatory Care Coordination Note     6/3/2025 2:48 PM     Patient Current Location:  Home: [address]  [place] [address]     ACM contacted the patient by telephone. Verified name and  with patient as identifiers.     Patient graduated from the High Risk Care Management program on 6/3/2025.  Patient has the ability to self-manage at this time..  Care management goals have been completed. No further Ambulatory Care Manager follow up scheduled.      ACM: Yara Mcgee RN     Challenges to be reviewed by the provider   Additional needs identified to be addressed with provider No  none               Method of communication with provider: none.    Utilization: Patient has not had any utilization since our last call.     Care Summary Note: Spoke with Chuck. Reports he is doing well. Confirmed he made changes following last PCP appointment- stopped Metformin and increased Farxiga dose. Reports this is actually going well for him. Reports blood sugars as stable. Denied any new needs today.     Offered patient enrollment in the Remote Patient Monitoring (RPM) program for in-home monitoring: Yes, but did not enroll at this time: already monitoring with home equipment.     Medications Reviewed:   Completed during this call    PCP/Specialist follow up:   Future Appointments         Provider Specialty Dept Phone    2025 3:40 PM Kaleigh Bhatia, APRN - CNP Primary Care [phone]            Follow Up:   No further Ambulatory Care Management follow-up scheduled at this time.  Patient  has Ambulatory Care Manager's contact information for any further questions, concerns or needs.

## 2025-06-09 DIAGNOSIS — E11.65 TYPE 2 DIABETES MELLITUS WITH HYPERGLYCEMIA, WITHOUT LONG-TERM CURRENT USE OF INSULIN (HCC): ICD-10-CM

## 2025-06-09 RX ORDER — DAPAGLIFLOZIN 10 MG/1
10 TABLET, FILM COATED ORAL EVERY MORNING
Qty: 90 TABLET | Refills: 0 | OUTPATIENT
Start: 2025-06-09

## 2025-06-16 DIAGNOSIS — K21.9 GASTROESOPHAGEAL REFLUX DISEASE WITHOUT ESOPHAGITIS: ICD-10-CM

## 2025-06-16 RX ORDER — FAMOTIDINE 20 MG/1
20 TABLET, FILM COATED ORAL 2 TIMES DAILY
Qty: 60 TABLET | Refills: 1 | Status: SHIPPED | OUTPATIENT
Start: 2025-06-16

## 2025-06-16 NOTE — TELEPHONE ENCOUNTER
Please Approve or Refuse.  Send to Pharmacy per Pt's Request:      Next Visit Date:  Visit date not found   Last Visit Date: 11/21/2023    Hemoglobin A1C (%)   Date Value   05/15/2025 7.7   02/10/2025 8.4   08/16/2023 5.6             ( goal A1C is < 7)   BP Readings from Last 3 Encounters:   05/15/25 134/72   02/10/25 128/82   08/01/24 (!) 150/95          (goal 120/80)  BUN   Date Value Ref Range Status   04/07/2024 15 6 - 20 mg/dL Final     Creatinine   Date Value Ref Range Status   04/07/2024 0.9 0.7 - 1.2 mg/dL Final     Potassium   Date Value Ref Range Status   04/07/2024 4.6 3.7 - 5.3 mmol/L Final

## 2025-06-17 DIAGNOSIS — E11.65 TYPE 2 DIABETES MELLITUS WITH HYPERGLYCEMIA, WITHOUT LONG-TERM CURRENT USE OF INSULIN (HCC): ICD-10-CM

## 2025-06-18 RX ORDER — ASPIRIN 81 MG/1
81 TABLET, COATED ORAL DAILY
Qty: 90 TABLET | Refills: 3 | Status: SHIPPED | OUTPATIENT
Start: 2025-06-18

## 2025-06-18 NOTE — TELEPHONE ENCOUNTER
Patient notified and verbalized understanding. Patient says he will send readings now.    Please Approve or Refuse.  Send to Pharmacy per Pt's Request:      Next Visit Date:  Visit date not found   Last Visit Date: 11/21/2023    Hemoglobin A1C (%)   Date Value   05/15/2025 7.7   02/10/2025 8.4   08/16/2023 5.6             ( goal A1C is < 7)   BP Readings from Last 3 Encounters:   05/15/25 134/72   02/10/25 128/82   08/01/24 (!) 150/95          (goal 120/80)  BUN   Date Value Ref Range Status   04/07/2024 15 6 - 20 mg/dL Final     Creatinine   Date Value Ref Range Status   04/07/2024 0.9 0.7 - 1.2 mg/dL Final     Potassium   Date Value Ref Range Status   04/07/2024 4.6 3.7 - 5.3 mmol/L Final

## 2025-08-04 DIAGNOSIS — F33.1 MODERATE EPISODE OF RECURRENT MAJOR DEPRESSIVE DISORDER (HCC): ICD-10-CM

## 2025-08-04 RX ORDER — SERTRALINE HYDROCHLORIDE 100 MG/1
100 TABLET, FILM COATED ORAL DAILY
Qty: 90 TABLET | Refills: 0 | Status: SHIPPED | OUTPATIENT
Start: 2025-08-04 | End: 2025-11-02

## 2025-08-11 DIAGNOSIS — E11.65 TYPE 2 DIABETES MELLITUS WITH HYPERGLYCEMIA, WITHOUT LONG-TERM CURRENT USE OF INSULIN (HCC): ICD-10-CM

## 2025-08-11 RX ORDER — DAPAGLIFLOZIN 10 MG/1
10 TABLET, FILM COATED ORAL EVERY MORNING
Qty: 90 TABLET | Refills: 0 | Status: SHIPPED | OUTPATIENT
Start: 2025-08-11

## 2025-08-18 ENCOUNTER — OFFICE VISIT (OUTPATIENT)
Dept: PRIMARY CARE CLINIC | Age: 45
End: 2025-08-18
Payer: COMMERCIAL

## 2025-08-18 VITALS
HEART RATE: 74 BPM | RESPIRATION RATE: 16 BRPM | DIASTOLIC BLOOD PRESSURE: 84 MMHG | BODY MASS INDEX: 52.07 KG/M2 | OXYGEN SATURATION: 95 % | SYSTOLIC BLOOD PRESSURE: 118 MMHG | WEIGHT: 315 LBS | TEMPERATURE: 97.1 F

## 2025-08-18 DIAGNOSIS — H61.22 IMPACTED CERUMEN OF LEFT EAR: ICD-10-CM

## 2025-08-18 DIAGNOSIS — Z13.220 LIPID SCREENING: ICD-10-CM

## 2025-08-18 DIAGNOSIS — E11.65 TYPE 2 DIABETES MELLITUS WITH HYPERGLYCEMIA, WITHOUT LONG-TERM CURRENT USE OF INSULIN (HCC): Primary | ICD-10-CM

## 2025-08-18 LAB — HBA1C MFR BLD: 6.7 %

## 2025-08-18 PROCEDURE — 99214 OFFICE O/P EST MOD 30 MIN: CPT | Performed by: NURSE PRACTITIONER

## 2025-08-18 PROCEDURE — 3074F SYST BP LT 130 MM HG: CPT | Performed by: NURSE PRACTITIONER

## 2025-08-18 PROCEDURE — G8417 CALC BMI ABV UP PARAM F/U: HCPCS | Performed by: NURSE PRACTITIONER

## 2025-08-18 PROCEDURE — 1036F TOBACCO NON-USER: CPT | Performed by: NURSE PRACTITIONER

## 2025-08-18 PROCEDURE — 69209 REMOVE IMPACTED EAR WAX UNI: CPT | Performed by: NURSE PRACTITIONER

## 2025-08-18 PROCEDURE — 3079F DIAST BP 80-89 MM HG: CPT | Performed by: NURSE PRACTITIONER

## 2025-08-18 PROCEDURE — 83036 HEMOGLOBIN GLYCOSYLATED A1C: CPT | Performed by: NURSE PRACTITIONER

## 2025-08-18 PROCEDURE — G8427 DOCREV CUR MEDS BY ELIG CLIN: HCPCS | Performed by: NURSE PRACTITIONER

## 2025-08-18 PROCEDURE — 3044F HG A1C LEVEL LT 7.0%: CPT | Performed by: NURSE PRACTITIONER

## 2025-08-18 PROCEDURE — 2022F DILAT RTA XM EVC RTNOPTHY: CPT | Performed by: NURSE PRACTITIONER

## 2025-08-18 RX ORDER — HYDROXYZINE PAMOATE 100 MG
CAPSULE ORAL
COMMUNITY
Start: 2025-06-24

## 2025-08-18 ASSESSMENT — PATIENT HEALTH QUESTIONNAIRE - PHQ9
5. POOR APPETITE OR OVEREATING: NOT AT ALL
SUM OF ALL RESPONSES TO PHQ QUESTIONS 1-9: 0
SUM OF ALL RESPONSES TO PHQ QUESTIONS 1-9: 0
1. LITTLE INTEREST OR PLEASURE IN DOING THINGS: NOT AT ALL
SUM OF ALL RESPONSES TO PHQ QUESTIONS 1-9: 0
3. TROUBLE FALLING OR STAYING ASLEEP: NOT AT ALL
8. MOVING OR SPEAKING SO SLOWLY THAT OTHER PEOPLE COULD HAVE NOTICED. OR THE OPPOSITE, BEING SO FIGETY OR RESTLESS THAT YOU HAVE BEEN MOVING AROUND A LOT MORE THAN USUAL: NOT AT ALL
4. FEELING TIRED OR HAVING LITTLE ENERGY: NOT AT ALL
10. IF YOU CHECKED OFF ANY PROBLEMS, HOW DIFFICULT HAVE THESE PROBLEMS MADE IT FOR YOU TO DO YOUR WORK, TAKE CARE OF THINGS AT HOME, OR GET ALONG WITH OTHER PEOPLE: NOT DIFFICULT AT ALL
6. FEELING BAD ABOUT YOURSELF - OR THAT YOU ARE A FAILURE OR HAVE LET YOURSELF OR YOUR FAMILY DOWN: NOT AT ALL
7. TROUBLE CONCENTRATING ON THINGS, SUCH AS READING THE NEWSPAPER OR WATCHING TELEVISION: NOT AT ALL
2. FEELING DOWN, DEPRESSED OR HOPELESS: NOT AT ALL
SUM OF ALL RESPONSES TO PHQ QUESTIONS 1-9: 0
9. THOUGHTS THAT YOU WOULD BE BETTER OFF DEAD, OR OF HURTING YOURSELF: NOT AT ALL

## 2025-08-18 ASSESSMENT — ENCOUNTER SYMPTOMS
GASTROINTESTINAL NEGATIVE: 1
ALLERGIC/IMMUNOLOGIC NEGATIVE: 1
RESPIRATORY NEGATIVE: 1
EYES NEGATIVE: 1

## (undated) DEVICE — BASIC SINGLE BASIN BTC-LF: Brand: MEDLINE INDUSTRIES, INC.

## (undated) DEVICE — BAG SPEC REM 224ML W4XL6IN DIA10MM 1 HND GYN DISP ENDOPCH

## (undated) DEVICE — GENERAL LAPAROSCOPY-LF: Brand: MEDLINE INDUSTRIES, INC.

## (undated) DEVICE — BLADELESS OBTURATOR: Brand: WECK VISTA

## (undated) DEVICE — COLUMN DRAPE

## (undated) DEVICE — ELECTRO LUBE IS A SINGLE PATIENT USE DEVICE THAT IS INTENDED TO BE USED ON ELECTROSURGICAL ELECTRODES TO REDUCE STICKING.: Brand: KEY SURGICAL ELECTRO LUBE

## (undated) DEVICE — TROCAR: Brand: KII FIOS FIRST ENTRY

## (undated) DEVICE — GLOVE ORANGE PI 8   MSG9080

## (undated) DEVICE — TUBING INSUFFLATOR HEAT HUMIDIFIED SMK EVAC SET PNEUMOCLEAR

## (undated) DEVICE — SUCTION IRRIGATOR: Brand: ENDOWRIST

## (undated) DEVICE — REDUCER: Brand: ENDOWRIST

## (undated) DEVICE — SEAL

## (undated) DEVICE — SUTURE VCRL + SZ 0 L18IN ABSRB TIE VCP106G

## (undated) DEVICE — GLOVE ORANGE PI 7   MSG9070

## (undated) DEVICE — GOWN,SIRUS,NON REINFRCD,LARGE,SET IN SL: Brand: MEDLINE

## (undated) DEVICE — INSUFFLATION NEEDLE TO ESTABLISH PNEUMOPERITONEUM.: Brand: INSUFFLATION NEEDLE

## (undated) DEVICE — ARM DRAPE